# Patient Record
Sex: FEMALE | Race: WHITE | Employment: OTHER | URBAN - METROPOLITAN AREA
[De-identification: names, ages, dates, MRNs, and addresses within clinical notes are randomized per-mention and may not be internally consistent; named-entity substitution may affect disease eponyms.]

---

## 2017-01-04 ENCOUNTER — APPOINTMENT (OUTPATIENT)
Dept: CT IMAGING | Age: 82
End: 2017-01-04
Attending: PHYSICIAN ASSISTANT
Payer: MEDICARE

## 2017-01-04 ENCOUNTER — HOSPITAL ENCOUNTER (EMERGENCY)
Age: 82
Discharge: HOME | End: 2017-01-04
Attending: EMERGENCY MEDICINE
Payer: MEDICARE

## 2017-01-04 ENCOUNTER — APPOINTMENT (OUTPATIENT)
Dept: GENERAL RADIOLOGY | Age: 82
End: 2017-01-04
Attending: PHYSICIAN ASSISTANT
Payer: MEDICARE

## 2017-01-04 VITALS
SYSTOLIC BLOOD PRESSURE: 107 MMHG | HEIGHT: 64 IN | OXYGEN SATURATION: 99 % | BODY MASS INDEX: 28.51 KG/M2 | HEART RATE: 82 BPM | TEMPERATURE: 98.3 F | WEIGHT: 167 LBS | DIASTOLIC BLOOD PRESSURE: 65 MMHG | RESPIRATION RATE: 18 BRPM

## 2017-01-04 DIAGNOSIS — M25.462 EFFUSION OF LEFT KNEE: ICD-10-CM

## 2017-01-04 DIAGNOSIS — M17.12 OSTEOARTHRITIS OF LEFT KNEE, UNSPECIFIED OSTEOARTHRITIS TYPE: ICD-10-CM

## 2017-01-04 DIAGNOSIS — S09.90XA HEAD INJURY, INITIAL ENCOUNTER: ICD-10-CM

## 2017-01-04 DIAGNOSIS — S63.502A LEFT WRIST SPRAIN, INITIAL ENCOUNTER: Primary | ICD-10-CM

## 2017-01-04 PROCEDURE — 77030036552 HC SPLNT WR FRARM PRE-FB S2SG -A

## 2017-01-04 PROCEDURE — 70450 CT HEAD/BRAIN W/O DYE: CPT

## 2017-01-04 PROCEDURE — 99283 EMERGENCY DEPT VISIT LOW MDM: CPT

## 2017-01-04 PROCEDURE — 73562 X-RAY EXAM OF KNEE 3: CPT

## 2017-01-04 PROCEDURE — 73110 X-RAY EXAM OF WRIST: CPT

## 2017-01-04 PROCEDURE — L1830 KO IMMOB CANVAS LONG PRE OTS: HCPCS

## 2017-01-04 RX ORDER — HYDROCODONE BITARTRATE AND ACETAMINOPHEN 5; 325 MG/1; MG/1
1 TABLET ORAL
Qty: 12 TAB | Refills: 0 | Status: SHIPPED | OUTPATIENT
Start: 2017-01-04 | End: 2017-02-14

## 2017-01-04 NOTE — DISCHARGE INSTRUCTIONS
We hope that we have addressed all of your medical concerns. The examination and treatment you received in the Emergency Department were for an emergent problem and were not intended as complete care. It is important that you follow up with your healthcare provider(s) for ongoing care. If your symptoms worsen or do not improve as expected, and you are unable to reach your usual health care provider(s), you should return to the Emergency Department. Today's healthcare is undergoing tremendous change, and patient satisfaction surveys are one of the many tools to assess the quality of medical care. You may receive a survey from the [employer] organization regarding your experience in the Emergency Department. I hope that your experience has been completely positive, particularly the medical care that I provided. As such, please participate in the survey; anything less than excellent does not meet my expectations or intentions. [facility] and [facility] participate in nationally recognized quality of care measures. If your blood pressure is greater than 120/80, as reported below, we urge that you seek medical care to address the potential of high blood pressure, commonly known as hypertension. Hypertension can be hereditary or can be caused by certain medical conditions, pain, stress, or \"white coat syndrome. \"       Please make an appointment with your health care provider(s) for follow up of your Emergency Department visit. VITALS:   Patient Vitals for the past 8 hrs:   Temp Pulse Resp BP SpO2   01/04/17 1500 98.3 °F (36.8 °C) 82 18 107/65 99 %          Thank you for allowing us to provide you with medical care today. We realize that you have many choices for your emergency care needs. Please choose us in the future for any continued health care needs. Kiesha Sow [place], [address] [place] [address] [address]   Office: [phone]            No results found for this or any previous visit (from the past 24 hour(s)). Xr Wrist Lt Ap/lat/obl Min 3v    Result Date: 1/4/2017  EXAM: XR WRIST LT AP/LAT/OBL MIN 3V INDICATION:  left wrist. COMPARISON: None. FINDINGS: 4  views of the left wrist demonstrate no fracture or other acute osseous or articular abnormality. 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 The soft tissues are within normal limits. IMPRESSION:  No acute abnormality. Ct Head Wo Cont    Result Date: 1/4/2017  INDICATION: Fall. EXAM: Multiple contiguous helically acquired images were obtained through the brain without intravenous contrast administration. Sagittal and coronal reconstructions were performed. CT dose reduction was achieved through the use of a standardized protocol tailored for this examination and automatic exposure control for dose modulation. FINDINGS: Comparison exam:  September 21, 2016. Images through the brain reveal moderate to severe prominence of the basilar cisterns and cortical sulci and ventricles. Moderate to severe relatively symmetric hypoattenuation is seen to be present in the deep white matter and centrum semiovale of both cerebral hemispheres. Subinsular hypodensity redemonstrated bilaterally. Probable right basal ganglia lacune, chronic. No confluent infarct or mass effect or shift of midline structures is identified. There is no intracranial hemorrhage identified. Bone windows demonstrate no depressed skull fracture. IMPRESSION: Moderate to severe atrophy. Moderate to severe deep white matter ischemic change. 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 No new confluent infarct or hemorrhage or mass effect. Xr Knee Lt 3 V    Result Date: 1/4/2017  EXAM:  XR KNEE LT 3 V INDICATION:   left knee pain. COMPARISON: None. FINDINGS: Three views of the left knee demonstrate no fracture or other acute osseous or articular abnormality.  There is tricompartmental osteoarthritis with mild to moderate medial joint space narrowing. Small knee joint effusion is present. IMPRESSION:  No acute fracture or dislocation. Osteoarthritis with small knee joint effusion. Wrist Sprain: Care Instructions  Your Care Instructions    Your wrist hurts because you have stretched or torn ligaments, which connect the bones in your wrist.  Wrist sprains usually take from 2 to 10 weeks to heal, but some take longer. Usually, the more pain you have, the more severe your wrist sprain is and the longer it will take to heal. You can heal faster and regain strength in your wrist with good home treatment. Follow-up care is a key part of your treatment and safety. Be sure to make and go to all appointments, and call your doctor if you are having problems. It's also a good idea to know your test results and keep a list of the medicines you take. How can you care for yourself at home? · Prop up your arm on a pillow when you ice it or anytime you sit or lie down for the next 3 days. Try to keep your wrist above the level of your heart. This will help reduce swelling. · Put ice or cold packs on your wrist for 10 to 20 minutes at a time. Try to do this every 1 to 2 hours for the next 3 days (when you are awake) or until the swelling goes down. Put a thin cloth between the ice pack and your skin. · After 2 or 3 days, if your swelling is gone, apply a heating pad set on low or a warm cloth to your wrist. This helps keep your wrist flexible. Some doctors suggest that you go back and forth between hot and cold. · If you have an elastic bandage, keep it on for the next 24 to 36 hours. The bandage should be snug but not so tight that it causes numbness or tingling. To rewrap the wrist, wrap the bandage around the hand a few times, beginning at the fingers. Then wrap it around the hand between the thumb and index finger, ending by circling the wrist several times.   · If your doctor gave you a splint or brace, wear it as directed to protect your wrist until it has healed. · Take pain medicines exactly as directed. ¨ If the doctor gave you a prescription medicine for pain, take it as prescribed. ¨ If you are not taking a prescription pain medicine, ask your doctor if you can take an over-the-counter medicine. · Try not to use your injured wrist and hand. When should you call for help? Call your doctor now or seek immediate medical care if:  · Your hand or fingers are cool or pale or change color. Watch closely for changes in your health, and be sure to contact your doctor if:  · Your pain gets worse. · Your wrist has not improved after 1 week. Where can you learn more? Go to http://smita-armand.info/. Enter G541 in the search box to learn more about \"Wrist Sprain: Care Instructions. \"  Current as of: May 23, 2016  Content Version: 11.1  © 3786-9528 [employer]. Care instructions adapted under license by [employer] (which disclaims liability or warranty for this information). If you have questions about a medical condition or this instruction, always ask your healthcare professional. [place] [address] any warranty or liability for your use of this information. Learning About RICE (Rest, Ice, Compression, and Elevation)  What is RICE? RICE is a way to care for an injury. RICE helps relieve pain and swelling. It may also help with healing and flexibility. RICE stands for:  · Rest and protect the injured or sore area. · Ice or a cold pack used as soon as possible. · Compression, or wrapping the injured or sore area with an elastic bandage. · Elevation (propping up) the injured or sore area. How do you do RICE? You can use RICE for home treatment when you have general aches and pains or after an injury or surgery. Rest  · Do not put weight on the injury for at least 24 to 48 hours.   · Use crutches for a badly sprained knee or ankle. · Support a sprained wrist, elbow, or shoulder with a sling. Ice  · Put ice or a cold pack on the injury right away to reduce pain and swelling. Frozen vegetables will also work as an ice pack. Put a thin cloth between the ice or cold pack and your skin. The cloth protects the injured area from getting too cold. · Use ice for 10 to 15 minutes at a time for the first 48 to 72 hours. Compression  · Use compression for sprains, strains, and surgeries of the arms and legs. · Wrap the injured area with an elastic bandage or compression sleeve to reduce swelling. · Don't wrap it too tightly. If the area below it feels numb, tingles, or feels cool, loosen the wrap. Elevation  · Use elevation for areas of the body that can be propped up, such as arms and legs. · Prop up the injured area on pillows whenever you use ice. Keep it propped up anytime you sit or lie down. · Try to keep the injured area at or above the level of your heart. This will help reduce swelling and bruising. Where can you learn more? Go to http://smitaJoinnusarmand.info/. Enter V523 in the search box to learn more about \"Learning About RICE (Rest, Ice, Compression, and Elevation). \"  Current as of: May 23, 2016  Content Version: 11.1  © 4339-0538 [employer]. Care instructions adapted under license by [employer] (which disclaims liability or warranty for this information). If you have questions about a medical condition or this instruction, always ask your healthcare professional. [place] [address] any warranty or liability for your use of this information. Closed Head Injury: After Your Visit  Your Care Instructions  You have had a head injury. Often, people cannot remember what happened right before or right after a head injury. Some head injuries can make you pass out, or lose consciousness, for a few seconds or minutes right after the injury.   You need to have someone watch you closely for the next 24 hours. Contact your regular doctor to discuss follow-up care. Follow-up care is a key part of your treatment and safety. Be sure to make and go to all appointments, and call your doctor if you are having problems. It's also a good idea to know your test results and keep a list of the medicines you take. How can you care for yourself at home? · Have another adult watch you closely for the next 24 hours. That person should check for signs that your head injury is getting worse. · Put ice or a cold pack on the sore area for 10 to 20 minutes at a time. Put a thin cloth between the ice and your skin. · Take an over-the-counter pain medicine, such as acetaminophen (Tylenol), ibuprofen (Advil, Motrin), or naproxen (Aleve). Read and follow all instructions on the label. · You may sleep. If your doctor tells you to, have another adult check you at the suggested times to make sure you are able to wake up, recognize the other adult, and act normally. · Take it easy for the next few days or longer if you are not feeling well. · Do not drink any alcohol for at least the next 24 hours. What is postconcussive syndrome? If you have had a mild concussion, you may have a mild headache or just feel \"not quite right. \" These symptoms are common and usually go away on their own over a few days to 4 weeks. Sometimes after a concussion you may feel as if you are not functioning as well as you did before the injury, and you may develop new symptoms. This is called postconcussive syndrome. You may:  · Have changes in your ability to solve problems, think, concentrate, or remember. · Have headaches. · Have changes in your sleep patterns, such as not being able to sleep or sleeping all the time. · Have changes in your personality. · Lack interest in your daily activities. · Become easily angered or anxious for no clear reason. · Have changes in your sex drive.   · Lose your sense of taste or smell. · Be dizzy, lightheaded, or unsteady and find it hard to stand or walk. When should you call for help? Call 911 anytime you think you may need emergency care. For example, call if:  · You have twitching, jerking, or a seizure. · You suddenly cannot walk or stand. · You passed out (lost consciousness). · You are confused, do not know where you are, or are very sleepy or hard to wake up. Call your doctor now or seek immediate medical care if:  · You continue to vomit after 2 hours, or you have new vomiting. · You have a new watery (not like mucus from a cold) or bloody fluid coming from your nose or ears. · You have new weakness or numbness in any part of your body. · You have trouble walking. · Your headaches get worse. · Your vision changes. Watch closely for changes in your health, and be sure to contact your doctor if:  · You do not get better as expected. Where can you learn more? Go to Rankomat.pl.be  Enter B594 in the search box to learn more about \"Closed Head Injury: After Your Visit. \"   © 9911-6804 [employer]. Care instructions adapted under license by [employer] (which disclaims liability or warranty for this information). This care instruction is for use with your licensed healthcare professional. If you have questions about a medical condition or this instruction, always ask your healthcare professional. [place] [address] any warranty or liability for your use of this information.   Content Version: 2.8.628820; Last Revised: June 27, 2012

## 2017-01-04 NOTE — ED TRIAGE NOTES
L wrist and L knee pain since a GLF yesterday. No head injury or LOC. Ambulatory since (with her cane as per usual). States the fall happened because her knee buckled.

## 2017-01-04 NOTE — ED PROVIDER NOTES
HPI Comments: Manuel Crystal is a 80 y.o. female who presents ambulatory to ER with c/o left wrist pain and left knee pain x fall yesterday afternoon. Pt states she was walking in the parking lot when her left knee buckled causing her to fall onto her left knee then her left wrist. Notes she struck the left side of her forehead/scalp on the pavement. Denies LOC. Notes someone saw her fall and came to help her stand up. Was able to stand up with assistance of the passerby. Notes she has been walking with her cane, which she normally does, since the fall without any significant difficulty. Does note that pain in her left knee is worse with walking/weight bearing and notes she has been unable to use her left wrist/arm to support herself when she goes to stand up and has been unable to use her cane with her left hand due to pain. Denies any weakness, numbness, blurred vision, visual changes, neck pain, back pain, vomiting, nausea, and any other sx. Friend with pt notes she has been having \"more trouble than usual\" getting around since the fall. She specifically denies any fevers, chills, nausea, vomiting, chest pain, shortness of breath, headache, rash, diarrhea, abdominal pain, urinary/bowel changes, sweating or weight loss.     PCP: Adeline Phipps MD   PMHx significant for: Past Medical History:    Arthritis                                                     Breast cancer ([address]                                           Diabetes ([facility]                                                Diarrhea                                                        Comment:chronic     Dyslipidemia                                                  GERD (gastroesophageal reflux disease)                        Hypertension                                                  LBBB (left bundle branch block)                                 Comment:chronic LBBB    Non-ischemic cardiomyopathy ([address]                               Comment:Cath 2/16/15 - Mild (non-obstructive) CAD,LVEF                30%, 2+ MR    Seizures (HCC)                                                  Comment:X1 post partum after first child was born- no                more seizures    Skin cancer                                                     Comment:melanoma on legs    Sleep apnea                                                     Comment:wears O2 at night- cannot tolerate CPAP    Systolic CHF ([facility]                                              Comment:Admission 2/13/16    Thyroid disease                                               PSHx significant for: Past Surgical History:    HX PARTIAL THYROIDECTOMY                                       HX HYSTEROSCOPY                                                HX HEART CATHETERIZATION                                         Comment:x2- no blockages    HX OTHER SURGICAL                                2000            Comment:remove benign tumore right side between rib and               right lung    HX HYSTERECTOMY                                  1967          HX OOPHORECTOMY                                  1967            Comment:left    HX DILATION AND CURETTAGE                        1960          HX ORTHOPAEDIC                                   2010            Comment:right knee replacement    HX ORTHOPAEDIC                                                   Comment:repair torn meniscus left knee    HX ORTHOPAEDIC                                   1996            Comment:repair knuckle on left pinky finger    HX CHOLECYSTECTOMY                                             HX BREAST LUMPECTOMY                             2003            Comment:right    HX UROLOGICAL                                    2003            Comment:bladder surgery x2    HX CATARACT REMOVAL                              1994            Comment:bilateral    HX HEENT                                         2004            Comment:surgery on upper jaw        -- Beta-Blockers (Beta-Adrenergic Blocking Agts) -- Unknown (comments)    --  Made her feel tired, breakout on her back   -- Other Medication -- Unknown (comments)    --  Bromides   -- Phenobarbital -- Unknown (comments)   -- Sulfa (Sulfonamide Antibiotics) -- Swelling    --  rash    There are no other complaints, changes or physical findings at this time. The history is provided by the patient and a friend.         Past Medical History:   Diagnosis Date    Arthritis     [facility]     Diabetes ([facility]     Diarrhea      chronic     Dyslipidemia     GERD (gastroesophageal reflux disease)     Hypertension     LBBB (left bundle branch block)      chronic LBBB    Non-ischemic cardiomyopathy ([facility]      Cath 2/16/15 - Mild (non-obstructive) CAD,LVEF 30%, 2+ MR    Seizures (HCC)      X1 post partum after first child was born- no more seizures    Skin cancer      melanoma on legs    Sleep apnea      wears O2 at night- cannot tolerate CPAP    Systolic CHF [facility])      Admission 2/13/16    Thyroid disease        Past Surgical History:   Procedure Laterality Date    Hx partial thyroidectomy      Hx hysteroscopy      Hx heart catheterization       x2- no blockages    Hx other surgical  2000     remove benign tumore right side between rib and right lung    Hx hysterectomy  1967    Hx oophorectomy  1967     left    Hx dilation and curettage  1960    Hx orthopaedic  2010     right knee replacement    Hx orthopaedic       repair torn meniscus left knee    Hx orthopaedic  1996     repair knuckle on left pinky finger    Hx cholecystectomy      Hx breast lumpectomy  2003     right    Hx urological  2003     bladder surgery x2    Hx cataract removal  1994     bilateral    Hx heent  2004     surgery on upper jaw         Family History:   Problem Relation Age of Onset    Stroke Mother        Social History     Social History    Marital status:      Spouse name: N/A    Number of children: N/A  Years of education: N/A     Occupational History    Not on file. Social History Main Topics    Smoking status: Never Smoker    Smokeless tobacco: Never Used    Alcohol use 2.4 oz/week     4 Glasses of wine per week      Comment: 4    Drug use: No    Sexual activity: Not on file     Other Topics Concern    Not on file     Social History Narrative         ALLERGIES: Beta-blockers (beta-adrenergic blocking agts); Other medication; Phenobarbital; and Sulfa (sulfonamide antibiotics)    Review of Systems   Constitutional: Negative. Negative for appetite change, chills, fatigue and fever. HENT: Negative. Negative for congestion and sore throat. Eyes: Negative. Negative for visual disturbance. Respiratory: Negative. Negative for cough and shortness of breath. Cardiovascular: Negative. Negative for chest pain, palpitations and leg swelling. Gastrointestinal: Negative. Negative for abdominal pain, constipation, diarrhea, nausea and vomiting. Genitourinary: Negative. Negative for dysuria, flank pain and hematuria. Musculoskeletal: Positive for arthralgias (L wrist, L knee). Negative for back pain and neck pain. Skin: Negative. Negative for rash. Neurological: Negative. Negative for dizziness, syncope, weakness, numbness and headaches. Hematological: Negative. Psychiatric/Behavioral: Negative. All other systems reviewed and are negative. Vitals:    01/04/17 1500   BP: 107/65   Pulse: 82   Resp: 18   Temp: 98.3 °F (36.8 °C)   SpO2: 99%   Weight: 75.8 kg (167 lb)   Height: 5' 4\" (1.626 m)            Physical Exam   Constitutional: She is oriented to person, place, and time. She appears well-developed and well-nourished. No distress. HENT:   Head: Normocephalic and atraumatic.    Right Ear: External ear normal.   Left Ear: External ear normal.   Nose: Nose normal.   Mouth/Throat: Oropharynx is clear and moist.   Eyes: Conjunctivae and EOM are normal. Pupils are equal, round, and reactive to light. Neck: Normal range of motion. Neck supple. Cardiovascular: Normal rate, S1 normal, S2 normal, normal heart sounds, intact distal pulses and normal pulses. Exam reveals no gallop and no friction rub. No murmur heard. Pulses:       Dorsalis pedis pulses are 2+ on the right side, and 2+ on the left side. Pulmonary/Chest: Effort normal and breath sounds normal. No accessory muscle usage. No respiratory distress. She has no decreased breath sounds. She has no wheezes. She has no rhonchi. She has no rales. Abdominal: Soft. Normal appearance and bowel sounds are normal. She exhibits no distension. There is no hepatosplenomegaly. There is no tenderness. There is no CVA tenderness. Musculoskeletal: Normal range of motion. She exhibits no edema or tenderness. Left knee TTP along anterior joint line with mild edema to joint with well healing scab from injury one week ago, no erythema, warmth, or other overlying skin abnormality. NVI distally, DP pulse 2+. Mild posterior left wrist TTP with mild edema/ecchymosis. Pain elicited with movement of wrist. Supination/pronation intact. No anatomic snuffbox tenderness. No further hand/LUE tenderness. NVI distally all fingers, brisk cap refill. No midline cervical, thoracic, or lumbar spinal tenderness to palpation. FROM spine and all joints/extremities in ER without difficulty. Ambulatory in ER without difficulty. Lymphadenopathy:     She has no cervical adenopathy. Neurological: She is alert and oriented to person, place, and time. She has normal strength. No sensory deficit. No focal neurologic deficit. Strength 5/5 and equal bilateral upper and lower extremities. NVI all extremities, brisk cap refill all extremities. DP pulse 2+ bilaterally. Radial pulse 2+ bilaterally. Skin: Skin is warm and dry. No rash noted. She is not diaphoretic. No erythema. No pallor. Psychiatric: She has a normal mood and affect.  Her behavior is normal.   Nursing note and vitals reviewed. MDM  Number of Diagnoses or Management Options  Effusion of left knee:   Head injury, initial encounter:   Left wrist sprain, initial encounter:   Osteoarthritis of left knee, unspecified osteoarthritis type:   Diagnosis management comments: DDx: sprain, strain, fx, contusion, head injury       Amount and/or Complexity of Data Reviewed  Tests in the radiology section of CPT®: ordered and reviewed  Obtain history from someone other than the patient: yes (Friend )  Review and summarize past medical records: yes  Discuss the patient with other providers: yes (Dr. Jcarlos Rahman)    Patient Progress  Patient progress: stable    ED Course       Procedures          3:48 PM   Triston Stevenson PA-C discussed patient with Alaina Harris MD who is in agreement with care plan as outlined. Will be in to see the patient. No further recommendations. Triston Stevenson PA-C       4:49 PM  Pt has been reevaluated. There are no new complaints, changes, or physical findings at this time. Medications have been reviewed w/ pt and/or family. Pt and/or family's questions have been answered. Pt and/or family expressed good understanding of the dx/tx/rx and is in agreement with plan of care. Pt instructed and agreed to f/u w/ PCP, ortho and to return to ED upon further deterioration. Pt is ready for discharge. LABORATORY TESTS:  No results found for this or any previous visit (from the past 12 hour(s)). IMAGING RESULTS:  CT HEAD WO CONT   Final Result      XR KNEE LT 3 V   Final Result      XR WRIST LT AP/LAT/OBL MIN 3V   Final Result        Xr Wrist Lt Ap/lat/obl Min 3v    Result Date: 1/4/2017  EXAM: XR WRIST LT AP/LAT/OBL MIN 3V INDICATION:  left wrist. COMPARISON: None. FINDINGS: 4  views of the left wrist demonstrate no fracture or other acute osseous or articular abnormality. 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  The soft tissues are within normal limits. IMPRESSION:  No acute abnormality. Ct Head Wo Cont    Result Date: 1/4/2017  INDICATION: Fall. EXAM: Multiple contiguous helically acquired images were obtained through the brain without intravenous contrast administration. Sagittal and coronal reconstructions were performed. CT dose reduction was achieved through the use of a standardized protocol tailored for this examination and automatic exposure control for dose modulation. FINDINGS: Comparison exam:  September 21, 2016. Images through the brain reveal moderate to severe prominence of the basilar cisterns and cortical sulci and ventricles. Moderate to severe relatively symmetric hypoattenuation is seen to be present in the deep white matter and centrum semiovale of both cerebral hemispheres. Subinsular hypodensity redemonstrated bilaterally. Probable right basal ganglia lacune, chronic. No confluent infarct or mass effect or shift of midline structures is identified. There is no intracranial hemorrhage identified. Bone windows demonstrate no depressed skull fracture. IMPRESSION: Moderate to severe atrophy. Moderate to severe deep white matter ischemic change. 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 No new confluent infarct or hemorrhage or mass effect. Xr Knee Lt 3 V    Result Date: 1/4/2017  EXAM:  XR KNEE LT 3 V INDICATION:   left knee pain. COMPARISON: None. FINDINGS: Three views of the left knee demonstrate no fracture or other acute osseous or articular abnormality. There is tricompartmental osteoarthritis with mild to moderate medial joint space narrowing. Small knee joint effusion is present. IMPRESSION:  No acute fracture or dislocation. Osteoarthritis with small knee joint effusion. MEDICATIONS GIVEN:  Medications - No data to display    IMPRESSION:  1. Left wrist sprain, initial encounter    2. Osteoarthritis of left knee, unspecified osteoarthritis type    3. Effusion of left knee    4.  Head injury, initial encounter        PLAN:  1. Current Discharge Medication List      START taking these medications    Details   HYDROcodone-acetaminophen (NORCO) 5-325 mg per tablet Take 1 Tab by mouth every four (4) hours as needed for Pain. Max Daily Amount: 6 Tabs. Qty: 12 Tab, Refills: 0         CONTINUE these medications which have NOT CHANGED    Details   bumetanide (BUMEX) 1 mg tablet One pill by mouth every morning and one pill every afternoon no later than 2:00 p.m. Qty: 180 Tab, Refills: 3    Associated Diagnoses: SOB (shortness of breath)      carvedilol (COREG) 6.25 mg tablet Take 1 Tab by mouth two (2) times daily (with meals). Qty: 180 Tab, Refills: 3    Associated Diagnoses: SOB (shortness of breath)      losartan (COZAAR) 25 mg tablet Take 1 Tab by mouth nightly. Qty: 90 Tab, Refills: 3      potassium chloride SR (K-TAB) 20 mEq tablet Take 1 Tab by mouth daily. Qty: 90 Tab, Refills: 3    Associated Diagnoses: Acute respiratory failure with hypoxia ([address] Systolic CHF, acute ([address] Essential hypertension; SOB (shortness of breath)      pravastatin (PRAVACHOL) 40 mg tablet Take 1 Tab by mouth nightly. Qty: 90 Tab, Refills: 3    Associated Diagnoses: SOB (shortness of breath); Systolic CHF, acute ([address] Acute respiratory failure with hypoxia ([address] Essential hypertension      ciprofloxacin HCl (CIPRO) 250 mg tablet Take  by mouth nightly. LACTOBACILLUS ACIDOPHILUS (PROBIOTIC PO) Take  by mouth daily. Associated Diagnoses: SOB (shortness of breath); Systolic CHF, acute ([address] Acute respiratory failure with hypoxia ([address] Essential hypertension      omeprazole (PRILOSEC) 20 mg capsule Take 20 mg by mouth daily. Associated Diagnoses: Acute respiratory failure with hypoxia ([address] Systolic CHF, acute ([address] Essential hypertension; SOB (shortness of breath)      aspirin 81 mg chewable tablet Take 1 Tab by mouth daily.   Qty: 30 Tab, Refills: 0      co-enzyme Q-10 (CO Q-10) 50 mg capsule Take 100 mg by mouth daily (after lunch). imipramine (TOFRANIL) 25 mg tablet Take 25 mg by mouth nightly. levothyroxine (SYNTHROID) 88 mcg tablet Take 88 mcg by mouth Daily (before breakfast). sertraline (ZOLOFT) 100 mg tablet Take 100 mg by mouth nightly. metFORMIN (GLUCOPHAGE) 1,000 mg tablet Take 1,000 mg by mouth two (2) times daily (with meals). ergocalciferol (VITAMIN D2) 50,000 unit capsule Take one tablet on Monday, Wednesday and Friday. melatonin 10 mg cap Take 10 mg by mouth nightly. calcium 500 mg tab Take 500 mg by mouth daily (after lunch). multivitamin (ONE A DAY) tablet Take 1 Tab by mouth daily (after lunch). DOCOSAHEXANOIC ACID/EPA (FISH OIL PO) Take 1 Tab by mouth daily (after lunch).            2.   Follow-up Information     Follow up With Details Comments [id number]  [place] Schedule an appointment as soon as possible for a visit in 2 days  [address]   [address] [facility]  [phone]      Ariela Shelton MD Schedule an appointment as soon as possible for a visit in 1 week As needed; ORTHOPEDICS [address] [facility] [address] [facility] [address] [phone]      [facility] EMERGENCY DEPT  If symptoms worsen [address] [facility]  [phone]          Return to ED if worse

## 2017-02-02 ENCOUNTER — HOSPITAL ENCOUNTER (OUTPATIENT)
Dept: LAB | Age: 82
Discharge: HOME | End: 2017-02-02
Payer: MEDICARE

## 2017-02-02 ENCOUNTER — CLINICAL SUPPORT (OUTPATIENT)
Dept: CARDIOLOGY CLINIC | Age: 82
End: 2017-02-02

## 2017-02-02 ENCOUNTER — OFFICE VISIT (OUTPATIENT)
Dept: CARDIOLOGY CLINIC | Age: 82
End: 2017-02-02

## 2017-02-02 VITALS
DIASTOLIC BLOOD PRESSURE: 60 MMHG | WEIGHT: 170 LBS | HEART RATE: 82 BPM | SYSTOLIC BLOOD PRESSURE: 132 MMHG | BODY MASS INDEX: 29.02 KG/M2 | HEIGHT: 64 IN

## 2017-02-02 DIAGNOSIS — E11.9 TYPE 2 DIABETES MELLITUS WITHOUT COMPLICATION, WITHOUT LONG-TERM CURRENT USE OF INSULIN (HCC): ICD-10-CM

## 2017-02-02 DIAGNOSIS — E78.5 DYSLIPIDEMIA: ICD-10-CM

## 2017-02-02 DIAGNOSIS — I50.22 CHRONIC SYSTOLIC HEART FAILURE (HCC): ICD-10-CM

## 2017-02-02 DIAGNOSIS — I10 ESSENTIAL HYPERTENSION: ICD-10-CM

## 2017-02-02 DIAGNOSIS — I50.22 CHRONIC SYSTOLIC HEART FAILURE (HCC): Primary | ICD-10-CM

## 2017-02-02 DIAGNOSIS — Z95.810 PRESENCE OF AUTOMATIC CARDIOVERTER/DEFIBRILLATOR (AICD): Primary | ICD-10-CM

## 2017-02-02 DIAGNOSIS — R06.02 SOB (SHORTNESS OF BREATH): Primary | ICD-10-CM

## 2017-02-02 PROCEDURE — 83880 ASSAY OF NATRIURETIC PEPTIDE: CPT

## 2017-02-02 PROCEDURE — 80053 COMPREHEN METABOLIC PANEL: CPT

## 2017-02-02 PROCEDURE — 83036 HEMOGLOBIN GLYCOSYLATED A1C: CPT

## 2017-02-02 PROCEDURE — 85027 COMPLETE CBC AUTOMATED: CPT

## 2017-02-02 PROCEDURE — 84443 ASSAY THYROID STIM HORMONE: CPT

## 2017-02-02 PROCEDURE — 80061 LIPID PANEL: CPT

## 2017-02-02 NOTE — PROGRESS NOTES
Doroteo Chaves MD. [facility]              Patient: Lily Morelos  : 1928      Today's Date: 2017            HISTORY OF PRESENT ILLNESS:     History of Present Illness:  Ms. Han Briceño is here for follow-up. She is feeling a little better. Energy level remains weak. She has had a couple of falls past year. She fell  - she says there was dizziness before hand but just became weak suddenly.                 PAST MEDICAL HISTORY:     Past Medical History   Diagnosis Date    Arthritis     [facility] ([facility]     Diabetes ([facility]     Diarrhea      chronic     Dyslipidemia     GERD (gastroesophageal reflux disease)     Hypertension     LBBB (left bundle branch block)      chronic LBBB    Non-ischemic cardiomyopathy ([facility]      Cath 2/16/15 - Mild (non-obstructive) CAD,LVEF 30%, 2+ MR    Seizures (HCC)      X1 post partum after first child was born- no more seizures    Skin cancer      melanoma on legs    Sleep apnea      wears O2 at night- cannot tolerate CPAP    Systolic CHF [facility])      Admission 16    Thyroid disease          Past Surgical History   Procedure Laterality Date    Hx partial thyroidectomy      Hx hysteroscopy      Hx heart catheterization       x2- no blockages    Hx other surgical       remove benign tumore right side between rib and right lung    Hx hysterectomy  1967    Hx oophorectomy  1967     left    Hx dilation and curettage  1960    Hx orthopaedic  2010     right knee replacement    Hx orthopaedic       repair torn meniscus left knee    Hx orthopaedic       repair knuckle on left pinky finger    Hx cholecystectomy      Hx breast lumpectomy  2003     right    Hx urological  2003     bladder surgery x2    Hx cataract removal       bilateral    Hx heent       surgery on upper jaw           MEDICATIONS:     Current Outpatient Prescriptions   Medication Sig Dispense Refill    HYDROcodone-acetaminophen (NORCO) 5-325 mg per tablet Take 1 Tab by mouth every four (4) hours as needed for Pain. Max Daily Amount: 6 Tabs. 12 Tab 0    bumetanide (BUMEX) 1 mg tablet One pill by mouth every morning and one pill every afternoon no later than 2:00 p.m. 180 Tab 3    carvedilol (COREG) 6.25 mg tablet Take 1 Tab by mouth two (2) times daily (with meals). 180 Tab 3    losartan (COZAAR) 25 mg tablet Take 1 Tab by mouth nightly. 90 Tab 3    potassium chloride SR (K-TAB) 20 mEq tablet Take 1 Tab by mouth daily. 90 Tab 3    pravastatin (PRAVACHOL) 40 mg tablet Take 1 Tab by mouth nightly. 90 Tab 3    ciprofloxacin HCl (CIPRO) 250 mg tablet Take  by mouth nightly.  LACTOBACILLUS ACIDOPHILUS (PROBIOTIC PO) Take  by mouth daily.  omeprazole (PRILOSEC) 20 mg capsule Take 20 mg by mouth daily.  aspirin 81 mg chewable tablet Take 1 Tab by mouth daily. 30 Tab 0    co-enzyme Q-10 (CO Q-10) 50 mg capsule Take 100 mg by mouth daily (after lunch).  imipramine (TOFRANIL) 25 mg tablet Take 25 mg by mouth nightly.  levothyroxine (SYNTHROID) 88 mcg tablet Take 88 mcg by mouth Daily (before breakfast).  sertraline (ZOLOFT) 100 mg tablet Take 100 mg by mouth nightly.  metFORMIN (GLUCOPHAGE) 1,000 mg tablet Take 1,000 mg by mouth two (2) times daily (with meals).  melatonin 10 mg cap Take 10 mg by mouth nightly.  calcium 500 mg tab Take 500 mg by mouth daily (after lunch).  multivitamin (ONE A DAY) tablet Take 1 Tab by mouth daily (after lunch).  DOCOSAHEXANOIC ACID/EPA (FISH OIL PO) Take 1 Tab by mouth daily (after lunch).  ergocalciferol (VITAMIN D2) 50,000 unit capsule Take one tablet on Monday, Wednesday and Friday.          Allergies   Allergen Reactions    Beta-Blockers (Beta-Adrenergic Blocking Agts) Unknown (comments)     Made her feel tired, breakout on her back    Other Medication Unknown (comments)     Bromides    Phenobarbital Unknown (comments)    Sulfa (Sulfonamide Antibiotics) Swelling     rash SOCIAL HISTORY:     Social History   Substance Use Topics    Smoking status: Never Smoker    Smokeless tobacco: Never Used    Alcohol use 2.4 oz/week     4 Glasses of wine per week      Comment: 4         FAMILY HISTORY:     Family History   Problem Relation Age of Onset    Stroke Mother              REVIEW OF SYSTEMS:             Review of Symptoms:  Constitutional: Negative for fever, chills  HEENT: Negative for nosebleeds, tinnitus, and vision changes.    Respiratory: + HARTLEY  Cardiovascular: Negative for  Syncope    Gastrointestinal: Negative for   melena.    Genitourinary: + recent UTI    Musculoskeletal: Negative for myalgias.    Skin: Negative for rash  Heme: No problems bleeding. Neurological: Negative for speech change and focal weakness.    + HA  + cough              PHYSICAL EXAM:             Physical Exam:  Visit Vitals    /60    Pulse 82    Ht 5' 4\" (1.626 m)    Wt 170 lb (77.1 kg)    BMI 29.18 kg/m2       Patient appears generally well, mood and affect are appropriate and pleasant. HEENT:  Hearing intact, non-icteric, normocephalic, atraumatic.    Neck Exam: Supple, No sig JVD sitting up. No carotid bruits.    Lung Exam: Clear to auscultation, even breath sounds.    Cardiac Exam: Regular rate and rhythm with no murmur  Abdomen: Soft, non-tender    Extremities: Moves all ext well. No lower extremity edema.   Psych: Appropriate affect  Neuro - Grossly intact              LABS / OTHER STUDIES:           Lab Results   Component Value Date/Time    Sodium 137 08/08/2016 03:56 PM    Potassium 3.9 08/08/2016 03:56 PM    Chloride 92 08/08/2016 03:56 PM    CO2 28 08/08/2016 03:56 PM    Anion gap 10 02/18/2016 03:30 AM    Glucose 174 08/08/2016 03:56 PM    BUN 35 08/08/2016 03:56 PM    Creatinine 1.21 08/08/2016 03:56 PM    BUN/Creatinine ratio 29 08/08/2016 03:56 PM    GFR est AA 46 08/08/2016 03:56 PM    GFR est non-AA 40 08/08/2016 03:56 PM    Calcium 9.6 08/08/2016 03:56 PM Bilirubin, total 0.5 02/13/2016 05:40 AM    AST (SGOT) 30 02/13/2016 05:40 AM    Alk. phosphatase 51 02/13/2016 05:40 AM    Protein, total 6.5 02/13/2016 05:40 AM    Albumin 3.4 02/18/2016 03:30 AM    Globulin 2.9 02/13/2016 05:40 AM    A-G Ratio 1.2 02/13/2016 05:40 AM    ALT (SGPT) 35 02/13/2016 05:40 AM     Lab Results   Component Value Date/Time    WBC 8.3 08/08/2016 03:56 PM    Hemoglobin (POC) 12.6 01/15/2014 12:49 PM    HGB 12.6 08/08/2016 03:56 PM    Hematocrit (POC) 37 01/15/2014 12:49 PM    HCT 37.9 08/08/2016 03:56 PM    PLATELET 953 63/37/0586 03:56 PM    MCV 98 08/08/2016 03:56 PM                             CARDIAC DIAGNOSTICS:                 Cardiac Evaluation Includes:  EKG 2/13/16 - NSR, IVCD, PRWP      Echo 2/13/16 - TDS.   LVEF 30-35%.   There was moderate diffuse hypokinesis - Only the basal anterolateral and infrolateral walls contracts normally.  RV size and function normal.  RVSP 35      CTA chest 2/13/16 - No evidence of pulmonary embolus. Mild pulmonary interstitial edema and small bilateral pleural effusions. Small bilateral indeterminate pulmonary nodules, measuring up to 4 mm.      Cath 2/16/16 - Mild (non-obstructive) CAD,LVEF 30%, 2+ MR      Echo 4/14/16 - LVEF 30%, mild-mod MR, RVSP 28       Limited Echo 5/26/16 - LVEF 20%     Echo 7/28/16 - LVEF 25%, grade 1 diastology, mild MR, RVSP 31     BIV ICD placed 8/15/16      Echo 2/2/17 - LVEF 40%, grade 1 diastology, mod LAE, mild-mod MR, mild TR, AV sclerosis     CT head 1/4/17 -Moderate to severe atrophy. Moderate to severe deep white matter ischemic change. 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 No new confluent infarct or hemorrhage or mass effect. EKG 12/1/15 - NSR, Nonspecific intraventricular block,                  ASSESSMENT AND PLAN:             Assessment and Plan:  1) Acute / Chronic decompensated CHF (systolic dysfunction, HFrEF)(non-ischemic CM) - discovered 2/13/16   - Echo 4/14/16 shows that LVEF still is 30%.      - Echo 5/26/16 shows LVEF just 20%.     Echo 7/28/16 with LVEF 25%. - BIV ICD placed 8/15/16   - She does feel a little better since then. - On Echo 2/2/17 LVEF is better at 40%  - She is still tired   - volume status is fair on Bumex   - continue Coreg.  Due to cough, switched from lisinopril to losartan (25 mg daily to start). She could not tolerate Entresto at all.        2) HTN    - BP stable   - continue meds   - she denies dizziness although has had a couple of falls - will follow - has an ICD now - I asked her to see since patient was asking about neurological workup       3) See me in 3 months. Patient expressed understanding of the plan - questions were answered.       On a side note, she is from [place] and worked for years as a .           Raine Stratton MD, [address] [place]  [address] [place] Rom[place], [address]      [address] [place] [address].  [address]  [facility], [address] [place] [address]  Ph: [phone]                                [phone]

## 2017-02-02 NOTE — PATIENT INSTRUCTIONS

## 2017-02-03 LAB
ALBUMIN SERPL-MCNC: 4.5 G/DL (ref 3.5–4.7)
ALBUMIN/GLOB SERPL: 1.9 {RATIO} (ref 1.1–2.5)
ALP SERPL-CCNC: 59 IU/L (ref 39–117)
ALT SERPL-CCNC: 15 IU/L (ref 0–32)
AST SERPL-CCNC: 19 IU/L (ref 0–40)
BILIRUB SERPL-MCNC: 0.4 MG/DL (ref 0–1.2)
BUN SERPL-MCNC: 34 MG/DL (ref 8–27)
BUN/CREAT SERPL: 25 (ref 11–26)
CALCIUM SERPL-MCNC: 9.6 MG/DL (ref 8.7–10.3)
CHLORIDE SERPL-SCNC: 97 MMOL/L (ref 96–106)
CHOLEST SERPL-MCNC: 184 MG/DL (ref 100–199)
CO2 SERPL-SCNC: 24 MMOL/L (ref 18–29)
CREAT SERPL-MCNC: 1.35 MG/DL (ref 0.57–1)
ERYTHROCYTE [DISTWIDTH] IN BLOOD BY AUTOMATED COUNT: 14.6 % (ref 12.3–15.4)
EST. AVERAGE GLUCOSE BLD GHB EST-MCNC: 151 MG/DL
GLOBULIN SER CALC-MCNC: 2.4 G/DL (ref 1.5–4.5)
GLUCOSE SERPL-MCNC: 172 MG/DL (ref 65–99)
HBA1C MFR BLD: 6.9 % (ref 4.8–5.6)
HCT VFR BLD AUTO: 34.5 % (ref 34–46.6)
HDLC SERPL-MCNC: 57 MG/DL
HGB BLD-MCNC: 11.5 G/DL (ref 11.1–15.9)
INTERPRETATION, 910389: NORMAL
INTERPRETATION: NORMAL
LDLC SERPL CALC-MCNC: 52 MG/DL (ref 0–99)
Lab: NORMAL
MCH RBC QN AUTO: 33.2 PG (ref 26.6–33)
MCHC RBC AUTO-ENTMCNC: 33.3 G/DL (ref 31.5–35.7)
MCV RBC AUTO: 100 FL (ref 79–97)
NT-PROBNP SERPL-MCNC: 1319 PG/ML (ref 0–738)
PDF IMAGE, 910387: NORMAL
PLATELET # BLD AUTO: 237 X10E3/UL (ref 150–379)
POTASSIUM SERPL-SCNC: 5.1 MMOL/L (ref 3.5–5.2)
PROT SERPL-MCNC: 6.9 G/DL (ref 6–8.5)
RBC # BLD AUTO: 3.46 X10E6/UL (ref 3.77–5.28)
SODIUM SERPL-SCNC: 141 MMOL/L (ref 134–144)
TRIGL SERPL-MCNC: 377 MG/DL (ref 0–149)
TSH SERPL DL<=0.005 MIU/L-ACNC: 0.76 UIU/ML (ref 0.45–4.5)
VLDLC SERPL CALC-MCNC: 75 MG/DL (ref 5–40)
WBC # BLD AUTO: 8.9 X10E3/UL (ref 3.4–10.8)

## 2017-02-14 ENCOUNTER — HOSPITAL ENCOUNTER (EMERGENCY)
Age: 82
Discharge: HOME | End: 2017-02-14
Attending: EMERGENCY MEDICINE | Admitting: EMERGENCY MEDICINE
Payer: MEDICARE

## 2017-02-14 VITALS
SYSTOLIC BLOOD PRESSURE: 159 MMHG | OXYGEN SATURATION: 98 % | HEART RATE: 100 BPM | WEIGHT: 168 LBS | BODY MASS INDEX: 28.68 KG/M2 | DIASTOLIC BLOOD PRESSURE: 72 MMHG | HEIGHT: 64 IN | TEMPERATURE: 97.9 F | RESPIRATION RATE: 16 BRPM

## 2017-02-14 DIAGNOSIS — S61.219A LACERATION OF FINGER, INITIAL ENCOUNTER: Primary | ICD-10-CM

## 2017-02-14 PROCEDURE — 74011250636 HC RX REV CODE- 250/636: Performed by: EMERGENCY MEDICINE

## 2017-02-14 PROCEDURE — 90715 TDAP VACCINE 7 YRS/> IM: CPT | Performed by: EMERGENCY MEDICINE

## 2017-02-14 PROCEDURE — 77030002916 HC SUT ETHLN J&J -A

## 2017-02-14 PROCEDURE — 75810000293 HC SIMP/SUPERF WND  RPR

## 2017-02-14 PROCEDURE — 90471 IMMUNIZATION ADMIN: CPT

## 2017-02-14 PROCEDURE — 77030018836 HC SOL IRR NACL ICUM -A

## 2017-02-14 PROCEDURE — 99282 EMERGENCY DEPT VISIT SF MDM: CPT

## 2017-02-14 PROCEDURE — 74011250637 HC RX REV CODE- 250/637: Performed by: EMERGENCY MEDICINE

## 2017-02-14 PROCEDURE — 74011000250 HC RX REV CODE- 250: Performed by: EMERGENCY MEDICINE

## 2017-02-14 RX ORDER — HYDROCODONE BITARTRATE AND ACETAMINOPHEN 5; 325 MG/1; MG/1
1 TABLET ORAL
Qty: 20 TAB | Refills: 0 | Status: SHIPPED | OUTPATIENT
Start: 2017-02-14 | End: 2017-06-16

## 2017-02-14 RX ORDER — LIDOCAINE HYDROCHLORIDE 20 MG/ML
10 INJECTION, SOLUTION EPIDURAL; INFILTRATION; INTRACAUDAL; PERINEURAL
Status: COMPLETED | OUTPATIENT
Start: 2017-02-14 | End: 2017-02-14

## 2017-02-14 RX ADMIN — LIDOCAINE HYDROCHLORIDE 200 MG: 20 INJECTION, SOLUTION EPIDURAL; INFILTRATION; INTRACAUDAL; PERINEURAL at 02:29

## 2017-02-14 RX ADMIN — TETANUS TOXOID, REDUCED DIPHTHERIA TOXOID AND ACELLULAR PERTUSSIS VACCINE, ADSORBED 0.5 ML: 5; 2.5; 8; 8; 2.5 SUSPENSION INTRAMUSCULAR at 03:17

## 2017-02-14 RX ADMIN — NEOMYCIN-BACITRACIN-POLYMYXIN OINT 1 PACKET: OINTMENT at 03:17

## 2017-02-14 NOTE — ED NOTES
Provider cleansed laceration prior to suture repair with saline via syringe and betadine. Post suture repair finger cleansed with Priya Wolfe. Bacitracin to area after cleansed and patted dry with 4x4s. Nonadherent gauze to area after antibiotic ointment placed as ordered. Finger dressed with tube gauze.

## 2017-02-14 NOTE — ED NOTES
Bedside and Verbal shift change report given to [address] [place] [address] (oncoming nurse) by Ebony Mittal (offgoing nurse). Report included the following information SBAR and ED Summary. Charge RN notified of patient waiting for 20 minutes after IM injection of Tetanus vaccine.

## 2017-02-14 NOTE — DISCHARGE INSTRUCTIONS
We hope that we have addressed all of your medical concerns. The examination and treatment you received in the Emergency Department were for an emergent problem and were not intended as complete care. It is important that you follow up with your healthcare provider(s) for ongoing care. If your symptoms worsen or do not improve as expected, and you are unable to reach your usual health care provider(s), you should return to the Emergency Department. Today's healthcare is undergoing tremendous change, and patient satisfaction surveys are one of the many tools to assess the quality of medical care. You may receive a survey from the [employer] regarding your experience in the Emergency Department. I hope that your experience has been completely positive, particularly the medical care that I provided. As such, please participate in the survey; anything less than excellent does not meet my expectations or intentions. [facility] and [address] [place] [address] participate in nationally recognized quality of care measures. If your blood pressure is greater than 120/80, as reported below, we urge that you seek medical care to address the potential of high blood pressure, commonly known as hypertension. Hypertension can be hereditary or can be caused by certain medical conditions, pain, stress, or \"white coat syndrome. \"       Please make an appointment with your health care provider(s) for follow up of your Emergency Department visit. VITALS:   Patient Vitals for the past 8 hrs:   Temp Pulse Resp BP SpO2   02/14/17 0207 97.9 °F (36.6 °C) 100 16 159/72 98 %          Thank you for allowing us to provide you with medical care today. We realize that you have many choices for your emergency care needs. Please choose us in the future for any continued health care needs. Vick Pugh, [address] Office: [phone]            No results found for this or any previous visit (from the past 24 hour(s)). No results found. Cuts: Care Instructions  Your Care Instructions  A cut can happen anywhere on your body. Stitches, staples, skin adhesives, or pieces of tape called Steri-Strips are sometimes used to keep the edges of a cut together and help it heal. Steri-Strips can be used by themselves or with stitches or staples. Sometimes cuts are left open. If the cut went deep and through the skin, the doctor may have closed the cut in two layers. A deeper layer of stitches brings the deep part of the cut together. These stitches will dissolve and don't need to be removed. The upper layer closure, which could be stitches, staples, Steri-Strips, or adhesive, is what you see on the cut. A cut is often covered by a bandage. The doctor has checked you carefully, but problems can develop later. If you notice any problems or new symptoms, get medical treatment right away. Follow-up care is a key part of your treatment and safety. Be sure to make and go to all appointments, and call your doctor if you are having problems. It's also a good idea to know your test results and keep a list of the medicines you take. How can you care for yourself at home? If a cut is open or closed  · Prop up the sore area on a pillow anytime you sit or lie down during the next 3 days. Try to keep it above the level of your heart. This will help reduce swelling. · Keep the cut dry for the first 24 to 48 hours. After this, you can shower if your doctor okays it. Pat the cut dry. · Don't soak the cut, such as in a bathtub. Your doctor will tell you when it's safe to get the cut wet. · After the first 24 to 48 hours, clean the cut with soap and water 2 times a day unless your doctor gives you different instructions. ¨ Don't use hydrogen peroxide or alcohol, which can slow healing.   ¨ You may cover the cut with a thin layer of petroleum jelly and a nonstick bandage. ¨ If the doctor put a bandage over the cut, put on a new bandage after cleaning the cut or if the bandage gets wet or dirty. · Avoid any activity that could cause your cut to reopen. · Be safe with medicines. Read and follow all instructions on the label. ¨ If the doctor gave you a prescription medicine for pain, take it as prescribed. ¨ If you are not taking a prescription pain medicine, ask your doctor if you can take an over-the-counter medicine. If the cut is closed with stitches, staples, or Steri-Strips  · Follow the above instructions for open or closed cuts. · Do not remove the stitches or staples on your own. Your doctor will tell you when to come back to have the stitches or staples removed. · Leave Steri-Strips on until they fall off. If the cut is closed with a skin adhesive  · Follow the above instructions for open or closed cuts. · Leave the skin adhesive on your skin until it falls off on its own. This may take 5 to 10 days. · Do not scratch, rub, or pick at the adhesive. · Do not put the sticky part of a bandage directly on the adhesive. · Do not put any kind of ointment, cream, or lotion over the area. This can make the adhesive fall off too soon. Do not use hydrogen peroxide or alcohol, which can slow healing. When should you call for help? Call your doctor now or seek immediate medical care if:  · You have new pain, or your pain gets worse. · The skin near the cut is cold or pale or changes color. · You have tingling, weakness, or numbness near the cut. · The cut starts to bleed, and blood soaks through the bandage. Oozing small amounts of blood is normal.  · You have trouble moving the area near the cut. · You have symptoms of infection, such as:  ¨ Increased pain, swelling, warmth, or redness around the cut. ¨ Red streaks leading from the cut. ¨ Pus draining from the cut. ¨ A fever.   Watch closely for changes in your health, and be sure to contact your doctor if:  · The cut reopens. · You do not get better as expected. Where can you learn more? Go to http://smita-armand.info/. Enter M735 in the search box to learn more about \"Cuts: Care Instructions. \"  Current as of: May 27, 2016  Content Version: 11.1  © 3212-6071 Reissued. Care instructions adapted under license by [employer] (which disclaims liability or warranty for this information). If you have questions about a medical condition or this instruction, always ask your healthcare professional. [place] [address] any warranty or liability for your use of this information.

## 2017-02-14 NOTE — ED TRIAGE NOTES
Patient reports she was opening a box with a knife 1 hour ago and cut her left index finger. States she cannot get the bleeding to stop.

## 2017-02-14 NOTE — ED PROVIDER NOTES
HPI Comments: 80 y.o. female with past medical history significant for HTN, Cardiac Cath, LBBB, Systolic CHF, Non-ischemic cardiomyopathy, DM, Arthritis, GERD, Seizures who presents from [address] [place] [address] driven by self with chief complaint of wound. Pt reports having difficulty opening pill bottle and used steak knife. She sustained a laceration to dorsal aspect of left, 2nd digit that is actively bleeding. Injury occured 1.5 hours ago. Pt notes Tetanus is UTD. Pt denies use of blood thinners. There are no other acute medical concerns at this time. PCP: Olimpia Ballesteros MD  Cardiology: Katty Sarah MD     Note written by Saul Arrington, as dictated by Michael Stahl, DO 2:33 AM      The history is provided by the patient. No  was used.       Past Medical History:   Diagnosis Date    Arthritis     [facility]     Diabetes ([facility]     Diarrhea      chronic     Dyslipidemia     GERD (gastroesophageal reflux disease)     Hypertension     LBBB (left bundle branch block)      chronic LBBB    Non-ischemic cardiomyopathy ([facility]      Cath 2/16/15 - Mild (non-obstructive) CAD,LVEF 30%, 2+ MR    Seizures (HCC)      X1 post partum after first child was born- no more seizures    Skin cancer      melanoma on legs    Sleep apnea      wears O2 at night- cannot tolerate CPAP    Systolic CHF [facility])      Admission 2/13/16    Thyroid disease        Past Surgical History:   Procedure Laterality Date    Hx partial thyroidectomy      Hx hysteroscopy      Hx heart catheterization       x2- no blockages    Hx other surgical  2000     remove benign tumore right side between rib and right lung    Hx hysterectomy  1967    Hx oophorectomy  1967     left    Hx dilation and curettage  1960    Hx orthopaedic  2010     right knee replacement    Hx orthopaedic       repair torn meniscus left knee    Hx orthopaedic  1996     repair knuckle on left pinky finger    Hx cholecystectomy  Hx breast lumpectomy  2003     right    Hx urological  2003     bladder surgery x2    Hx cataract removal  1994     bilateral    Hx heent  2004     surgery on upper jaw         Family History:   Problem Relation Age of Onset    Stroke Mother        Social History     Social History    Marital status:      Spouse name: N/A    Number of children: N/A    Years of education: N/A     Occupational History    Not on file. Social History Main Topics    Smoking status: Never Smoker    Smokeless tobacco: Never Used    Alcohol use 2.4 oz/week     4 Glasses of wine per week      Comment: 4    Drug use: No    Sexual activity: Not on file     Other Topics Concern    Not on file     Social History Narrative     ALLERGIES: Beta-blockers (beta-adrenergic blocking agts); Other medication; Phenobarbital; and Sulfa (sulfonamide antibiotics)    Review of Systems   Constitutional: Negative for appetite change, chills, fever and unexpected weight change. HENT: Negative for ear pain, hearing loss, rhinorrhea and trouble swallowing. Eyes: Negative for pain and visual disturbance. Respiratory: Negative for cough, chest tightness and shortness of breath. Cardiovascular: Negative for chest pain and palpitations. Gastrointestinal: Negative for abdominal distention, abdominal pain, blood in stool and vomiting. Genitourinary: Negative for dysuria, hematuria and urgency. Musculoskeletal: Negative for back pain and myalgias. Skin: Positive for wound (laceration to left second digit ). Negative for rash. Neurological: Negative for dizziness, syncope, weakness and numbness. Psychiatric/Behavioral: Negative for confusion and suicidal ideas. All other systems reviewed and are negative.       Vitals:    02/14/17 0207   BP: 159/72   Pulse: 100   Resp: 16   Temp: 97.9 °F (36.6 °C)   SpO2: 98%   Weight: 76.2 kg (168 lb)   Height: 5' 4\" (1.626 m)            Physical Exam   Constitutional: She is oriented to person, place, and time. She appears well-developed and well-nourished. No distress. HENT:   Head: Normocephalic and atraumatic. Right Ear: External ear normal.   Left Ear: External ear normal.   Nose: Nose normal.   Mouth/Throat: Oropharynx is clear and moist. No oropharyngeal exudate. Eyes: Conjunctivae and EOM are normal. Pupils are equal, round, and reactive to light. Right eye exhibits no discharge. Left eye exhibits no discharge. No scleral icterus. Neck: Normal range of motion. Neck supple. No JVD present. No tracheal deviation present. Cardiovascular: Normal rate, regular rhythm, normal heart sounds and intact distal pulses. Exam reveals no gallop and no friction rub. No murmur heard. Pulmonary/Chest: Effort normal and breath sounds normal. No stridor. No respiratory distress. She has no decreased breath sounds. She has no wheezes. She has no rhonchi. She has no rales. She exhibits no tenderness. Abdominal: Soft. Bowel sounds are normal. She exhibits no distension. There is no tenderness. There is no rebound and no guarding. Musculoskeletal: Normal range of motion. She exhibits tenderness. She exhibits no edema. Left hand: She exhibits tenderness and laceration. Hands:  Neurological: She is alert and oriented to person, place, and time. She has normal strength and normal reflexes. No cranial nerve deficit or sensory deficit. She exhibits normal muscle tone. Coordination normal. GCS eye subscore is 4. GCS verbal subscore is 5. GCS motor subscore is 6. Skin: Skin is warm and dry. Laceration noted. She is not diaphoretic. No pallor. Left hand: 2 cm linear laceration between MCP and PIP joint. No decrease ROM or strength. Tendon exposed but no obvious laceration of tendon. Psychiatric: She has a normal mood and affect. Her behavior is normal. Judgment and thought content normal.   Nursing note and vitals reviewed.   Note written by Saul Browning, as dictated by Cassandra Camacho DO 2:36 AM     MDM  Number of Diagnoses or Management Options  Laceration of finger, initial encounter:   Risk of Complications, Morbidity, and/or Mortality  Presenting problems: low  Diagnostic procedures: low  Management options: low    Patient Progress  Patient progress: stable    ED Course       Wound Repair  Date/Time: 2/14/2017 2:45 AM  Performed by: attendingPreparation: skin prepped with Betadine  Pre-procedure re-eval: Immediately prior to the procedure, the patient was reevaluated and found suitable for the planned procedure and any planned medications. Time out: Immediately prior to the procedure a time out was called to verify the correct patient, procedure, equipment, staff and marking as appropriate. .  Location details: left index finger  Wound length:2.5 cm or less (2 cm)  Anesthesia: digital block    Anesthesia:  Anesthesia: digital block  Local Anesthetic: lidocaine 2% without epinephrine   Anesthetic total: 4 mL  Foreign bodies: no foreign bodies  Irrigation solution: saline  Irrigation method: syringe  Debridement: none  Skin closure: 5-0 nylon  Number of sutures: 6  Technique: simple and interrupted  Approximation: close  Dressing: antibiotic ointment and tube gauze  Patient tolerance: Patient tolerated the procedure well with no immediate complications  My total time at bedside, performing this procedure was 16-30 minutes (20 minutes). Chief Complaint   Patient presents with    Laceration       5:05 AM  The patients presenting problems have been discussed, and they are in agreement with the care plan formulated and outlined with them. I have encouraged them to ask questions as they arise throughout their visit.     MEDICATIONS GIVEN:  Medications   lidocaine (PF) (XYLOCAINE) 20 mg/mL (2 %) injection 200 mg (200 mg IntraDERMal Given by Provider 2/14/17 0723)   neomycin-bacitracnZn-polymyxnB (NEOSPORIN) ointment 1 Packet (1 Packet Topical Given 2/14/17 5522) diph,Pertuss(AC),Tet Vac-PF (BOOSTRIX) suspension 0.5 mL (0.5 mL IntraMUSCular Given 2/14/17 0317)       LABS REVIEWED:  No results found for this or any previous visit (from the past 24 hour(s)). VITAL SIGNS:  Patient Vitals for the past 12 hrs:   Temp Pulse Resp BP SpO2   02/14/17 0207 97.9 °F (36.6 °C) 100 16 159/72 98 %       RADIOLOGY RESULTS:  The following have been ordered and reviewed:  No orders to display     PROGRESS NOTES:  Discussed results and plan with patient. Patient will be discharged home with PCP followup for suture removal. Patient instructed to return to the emergency room for any worsening symptoms or any other concerns. DIAGNOSIS:    1. Laceration of finger, initial encounter        PLAN:  Follow-up Information     Follow up With Details Comments [address] Phi MD In 10 days For suture removal [address] [place] [address]  [phone]      [facility] EMERGENCY DEPT  If symptoms worsen [facility]  [phone]        Discharge Medication List as of 2/14/2017  3:19 AM      CONTINUE these medications which have CHANGED    Details   HYDROcodone-acetaminophen (NORCO) 5-325 mg per tablet Take 1 Tab by mouth every four (4) hours as needed for Pain. Max Daily Amount: 6 Tabs., Print, Disp-20 Tab, R-0         CONTINUE these medications which have NOT CHANGED    Details   bumetanide (BUMEX) 1 mg tablet One pill by mouth every morning and one pill every afternoon no later than 2:00 p.m., Print, Disp-180 Tab, R-3      carvedilol (COREG) 6.25 mg tablet Take 1 Tab by mouth two (2) times daily (with meals). , Print, Disp-180 Tab, R-3      losartan (COZAAR) 25 mg tablet Take 1 Tab by mouth nightly. , Print, Disp-90 Tab, R-3      potassium chloride SR (K-TAB) 20 mEq tablet Take 1 Tab by mouth daily. , Print, Disp-90 Tab, R-3      pravastatin (PRAVACHOL) 40 mg tablet Take 1 Tab by mouth nightly. , Print, Disp-90 Tab, R-3 ciprofloxacin HCl (CIPRO) 250 mg tablet Take  by mouth nightly., Historical Med      LACTOBACILLUS ACIDOPHILUS (PROBIOTIC PO) Take  by mouth daily. , Historical Med      omeprazole (PRILOSEC) 20 mg capsule Take 20 mg by mouth daily. , Historical Med      aspirin 81 mg chewable tablet Take 1 Tab by mouth daily. , Print, Disp-30 Tab, R-0      co-enzyme Q-10 (CO Q-10) 50 mg capsule Take 100 mg by mouth daily (after lunch). , Historical Med      imipramine (TOFRANIL) 25 mg tablet Take 25 mg by mouth nightly., Historical Med      levothyroxine (SYNTHROID) 88 mcg tablet Take 88 mcg by mouth Daily (before breakfast). , Historical Med      sertraline (ZOLOFT) 100 mg tablet Take 100 mg by mouth nightly., Historical Med      metFORMIN (GLUCOPHAGE) 1,000 mg tablet Take 1,000 mg by mouth two (2) times daily (with meals). , Historical Med      ergocalciferol (VITAMIN D2) 50,000 unit capsule Take one tablet on Monday, Wednesday and Friday., Historical Med      melatonin 10 mg cap Take 10 mg by mouth nightly., Historical Med      calcium 500 mg tab Take 500 mg by mouth daily (after lunch). , Historical Med      multivitamin (ONE A DAY) tablet Take 1 Tab by mouth daily (after lunch). , Historical Med      DOCOSAHEXANOIC ACID/EPA (FISH OIL PO) Take 1 Tab by mouth daily (after lunch). , Historical Med               ED COURSE: The patients hospital course has been uncomplicated.

## 2017-02-14 NOTE — ED NOTES
Discharged at this time. No signs or symptoms of reaction. Discharge instructions given by provider. Ambulatory upon discharge.

## 2017-02-27 DIAGNOSIS — I10 ESSENTIAL HYPERTENSION: ICD-10-CM

## 2017-02-27 DIAGNOSIS — J96.01 ACUTE RESPIRATORY FAILURE WITH HYPOXIA (HCC): ICD-10-CM

## 2017-02-27 DIAGNOSIS — I50.21 SYSTOLIC CHF, ACUTE (HCC): ICD-10-CM

## 2017-02-27 DIAGNOSIS — R06.02 SOB (SHORTNESS OF BREATH): ICD-10-CM

## 2017-02-27 NOTE — TELEPHONE ENCOUNTER
Pharmacy verified     30 day supply with refills     Pharmacist advised that [facility] told her that patient last filled this with them in 8/9/16.      Thank you, Sunshine Mccabe

## 2017-02-28 RX ORDER — POTASSIUM CHLORIDE 1500 MG/1
20 TABLET, FILM COATED, EXTENDED RELEASE ORAL DAILY
Qty: 90 TAB | Refills: 3 | Status: SHIPPED | OUTPATIENT
Start: 2017-02-28 | End: 2017-03-24 | Stop reason: SDUPTHER

## 2017-03-05 DIAGNOSIS — R06.02 SOB (SHORTNESS OF BREATH): ICD-10-CM

## 2017-03-05 RX ORDER — BUMETANIDE 1 MG/1
TABLET ORAL
Qty: 180 TAB | Refills: 3 | Status: SHIPPED | OUTPATIENT
Start: 2017-03-05 | End: 2017-03-24 | Stop reason: SDUPTHER

## 2017-03-05 RX ORDER — CARVEDILOL 6.25 MG/1
6.25 TABLET ORAL 2 TIMES DAILY WITH MEALS
Qty: 180 TAB | Refills: 3 | Status: SHIPPED | OUTPATIENT
Start: 2017-03-05 | End: 2017-03-24 | Stop reason: SDUPTHER

## 2017-03-05 RX ORDER — LOSARTAN POTASSIUM 25 MG/1
25 TABLET ORAL
Qty: 90 TAB | Refills: 3 | Status: SHIPPED | OUTPATIENT
Start: 2017-03-05 | End: 2017-03-24 | Stop reason: SDUPTHER

## 2017-03-24 DIAGNOSIS — R06.02 SOB (SHORTNESS OF BREATH): ICD-10-CM

## 2017-03-24 DIAGNOSIS — I50.21 SYSTOLIC CHF, ACUTE (HCC): ICD-10-CM

## 2017-03-24 DIAGNOSIS — J96.01 ACUTE RESPIRATORY FAILURE WITH HYPOXIA (HCC): ICD-10-CM

## 2017-03-24 DIAGNOSIS — I10 ESSENTIAL HYPERTENSION: ICD-10-CM

## 2017-03-24 RX ORDER — LOSARTAN POTASSIUM 25 MG/1
25 TABLET ORAL
Qty: 90 TAB | Refills: 3 | Status: SHIPPED | OUTPATIENT
Start: 2017-03-24 | End: 2017-04-27 | Stop reason: SDUPTHER

## 2017-03-24 RX ORDER — BUMETANIDE 1 MG/1
TABLET ORAL
Qty: 180 TAB | Refills: 3 | Status: SHIPPED | OUTPATIENT
Start: 2017-03-24 | End: 2017-04-27 | Stop reason: SDUPTHER

## 2017-03-24 RX ORDER — CARVEDILOL 6.25 MG/1
6.25 TABLET ORAL 2 TIMES DAILY WITH MEALS
Qty: 180 TAB | Refills: 3 | Status: SHIPPED | OUTPATIENT
Start: 2017-03-24 | End: 2017-04-27 | Stop reason: SDUPTHER

## 2017-03-24 RX ORDER — POTASSIUM CHLORIDE 1500 MG/1
20 TABLET, FILM COATED, EXTENDED RELEASE ORAL DAILY
Qty: 90 TAB | Refills: 3 | Status: SHIPPED | OUTPATIENT
Start: 2017-03-24 | End: 2017-06-16

## 2017-04-27 ENCOUNTER — OFFICE VISIT (OUTPATIENT)
Dept: CARDIOLOGY CLINIC | Age: 82
End: 2017-04-27

## 2017-04-27 ENCOUNTER — HOSPITAL ENCOUNTER (OUTPATIENT)
Dept: LAB | Age: 82
Discharge: HOME | End: 2017-04-27
Payer: MEDICARE

## 2017-04-27 VITALS
SYSTOLIC BLOOD PRESSURE: 120 MMHG | HEART RATE: 90 BPM | RESPIRATION RATE: 20 BRPM | BODY MASS INDEX: 28.17 KG/M2 | WEIGHT: 165 LBS | OXYGEN SATURATION: 97 % | HEIGHT: 64 IN | DIASTOLIC BLOOD PRESSURE: 63 MMHG

## 2017-04-27 DIAGNOSIS — I10 ESSENTIAL HYPERTENSION: ICD-10-CM

## 2017-04-27 DIAGNOSIS — I50.22 CHRONIC SYSTOLIC HEART FAILURE (HCC): ICD-10-CM

## 2017-04-27 DIAGNOSIS — R06.02 SOB (SHORTNESS OF BREATH): Primary | ICD-10-CM

## 2017-04-27 PROCEDURE — 80053 COMPREHEN METABOLIC PANEL: CPT

## 2017-04-27 PROCEDURE — 85027 COMPLETE CBC AUTOMATED: CPT

## 2017-04-27 PROCEDURE — 83880 ASSAY OF NATRIURETIC PEPTIDE: CPT

## 2017-04-27 RX ORDER — BUMETANIDE 1 MG/1
TABLET ORAL
Qty: 180 TAB | Refills: 3 | Status: SHIPPED | OUTPATIENT
Start: 2017-04-27 | End: 2018-02-08 | Stop reason: SDUPTHER

## 2017-04-27 RX ORDER — IMIPRAMINE HYDROCHLORIDE 25 MG/1
25 TABLET ORAL
Qty: 30 TAB | Refills: 3 | Status: CANCELLED | OUTPATIENT
Start: 2017-04-27

## 2017-04-27 RX ORDER — POTASSIUM CHLORIDE 20 MEQ/1
20 TABLET, EXTENDED RELEASE ORAL DAILY
Qty: 30 TAB | Refills: 3 | Status: SHIPPED | OUTPATIENT
Start: 2017-04-27 | End: 2019-08-03

## 2017-04-27 RX ORDER — LOSARTAN POTASSIUM 25 MG/1
25 TABLET ORAL
Qty: 90 TAB | Refills: 3 | Status: SHIPPED | OUTPATIENT
Start: 2017-04-27 | End: 2017-06-16

## 2017-04-27 RX ORDER — POTASSIUM CHLORIDE 20 MEQ/1
TABLET, EXTENDED RELEASE ORAL
COMMUNITY
Start: 2017-03-09 | End: 2017-04-27 | Stop reason: SDUPTHER

## 2017-04-27 RX ORDER — CARVEDILOL 6.25 MG/1
6.25 TABLET ORAL 2 TIMES DAILY WITH MEALS
Qty: 180 TAB | Refills: 3 | Status: SHIPPED | OUTPATIENT
Start: 2017-04-27 | End: 2018-04-16 | Stop reason: SDUPTHER

## 2017-04-27 NOTE — PROGRESS NOTES
Malia Alonzo MD. [facility]              Patient: Daisy Mcleod  : 1928      Today's Date: 2017            HISTORY OF PRESENT ILLNESS:     History of Present Illness:  Ms. Kavitha Callahan is here for follow-up. She says she is doing OK. Still has lack of energy. Breathing is OK and mood is OK. No CP.   She has had 3 falls past year - happens on days she feels well - is not dizzy or nauseated -             PAST MEDICAL HISTORY:     Past Medical History:   Diagnosis Date    Arthritis     Breast cancer ([address]     Diabetes ([facility]     Diarrhea     chronic     Dyslipidemia     GERD (gastroesophageal reflux disease)     Hypertension     LBBB (left bundle branch block)     chronic LBBB    Non-ischemic cardiomyopathy ([address]     Cath 2/16/15 - Mild (non-obstructive) CAD,LVEF 30%, 2+ MR    Seizures (HCC)     X1 post partum after first child was born- no more seizures    Skin cancer     melanoma on legs    Sleep apnea     wears O2 at night- cannot tolerate CPAP    Systolic CHF ([facility]     Admission 16    Thyroid disease          Past Surgical History:   Procedure Laterality Date    HX BREAST LUMPECTOMY  2003    right    HX CATARACT REMOVAL      bilateral    HX CHOLECYSTECTOMY      HX DILATION AND CURETTAGE  1960    HX HEART CATHETERIZATION      x2- no blockages    HX HEENT      surgery on upper jaw    HX HYSTERECTOMY  1967    HX HYSTEROSCOPY      HX OOPHORECTOMY  1967    left    HX ORTHOPAEDIC  2010    right knee replacement    HX ORTHOPAEDIC      repair torn meniscus left knee    HX ORTHOPAEDIC      repair knuckle on left pinky finger    HX OTHER SURGICAL  2000    remove benign tumore right side between rib and right lung    HX PARTIAL THYROIDECTOMY      HX UROLOGICAL      bladder surgery x2           MEDICATIONS:     Current Outpatient Prescriptions   Medication Sig Dispense Refill    bumetanide (BUMEX) 1 mg tablet One pill by mouth by 9:00 a.m. and one pill no later than 2: 00 p.m. 180 Tab 3    carvedilol (COREG) 6.25 mg tablet Take 1 Tab by mouth two (2) times daily (with meals). 180 Tab 3    pravastatin (PRAVACHOL) 40 mg tablet Take 1 Tab by mouth nightly. 90 Tab 3    LACTOBACILLUS ACIDOPHILUS (PROBIOTIC PO) Take  by mouth daily.  aspirin 81 mg chewable tablet Take 1 Tab by mouth daily. 30 Tab 0    co-enzyme Q-10 (CO Q-10) 50 mg capsule Take 100 mg by mouth daily (after lunch).  imipramine (TOFRANIL) 25 mg tablet Take 25 mg by mouth nightly.  levothyroxine (SYNTHROID) 88 mcg tablet Take 88 mcg by mouth Daily (before breakfast).  sertraline (ZOLOFT) 100 mg tablet Take 100 mg by mouth nightly.  metFORMIN (GLUCOPHAGE) 1,000 mg tablet Take 1,000 mg by mouth two (2) times daily (with meals).  ergocalciferol (VITAMIN D2) 50,000 unit capsule Take one tablet on Monday, Wednesday and Friday.  melatonin 10 mg cap Take 10 mg by mouth nightly.  calcium 500 mg tab Take 500 mg by mouth daily (after lunch).  multivitamin (ONE A DAY) tablet Take 1 Tab by mouth daily (after lunch).  DOCOSAHEXANOIC ACID/EPA (FISH OIL PO) Take 1 Tab by mouth daily (after lunch).  potassium chloride (K-DUR, KLOR-CON) 20 mEq tablet       losartan (COZAAR) 25 mg tablet Take 1 Tab by mouth nightly. 90 Tab 3    potassium chloride SR (K-TAB) 20 mEq tablet Take 1 Tab by mouth daily. 90 Tab 3    HYDROcodone-acetaminophen (NORCO) 5-325 mg per tablet Take 1 Tab by mouth every four (4) hours as needed for Pain. Max Daily Amount: 6 Tabs. 20 Tab 0    ciprofloxacin HCl (CIPRO) 250 mg tablet Take  by mouth nightly.  omeprazole (PRILOSEC) 20 mg capsule Take 20 mg by mouth daily.          Allergies   Allergen Reactions    Beta-Blockers (Beta-Adrenergic Blocking Agts) Unknown (comments)     Made her feel tired, breakout on her back    Other Medication Unknown (comments)     Bromides    Phenobarbital Unknown (comments)    Sulfa (Sulfonamide Antibiotics) Swelling rash             SOCIAL HISTORY:     Social History   Substance Use Topics    Smoking status: Never Smoker    Smokeless tobacco: Never Used    Alcohol use 2.4 oz/week     4 Glasses of wine per week      Comment: 4         FAMILY HISTORY:     Family History   Problem Relation Age of Onset    Stroke Mother            REVIEW OF SYSTEMS:             Review of Symptoms:  Constitutional: Negative for fever, chills  HEENT: Negative for nosebleeds, tinnitus, and vision changes.    Respiratory: + HARTLEY  Cardiovascular: Negative for  Syncope    Gastrointestinal: Negative for   melena.    Genitourinary: + recent UTI    Musculoskeletal: Negative for myalgias.    Skin: Negative for rash  Heme: No problems bleeding. Neurological: Negative for speech change and focal weakness.    + HA  + cough              PHYSICAL EXAM:             Physical Exam:  Visit Vitals    /63 (BP 1 Location: Left arm, BP Patient Position: Sitting)    Pulse 90    Resp 20    Ht 5' 4\" (1.626 m)    Wt 165 lb (74.8 kg)    SpO2 97%    BMI 28.32 kg/m2          Patient appears generally well, mood and affect are appropriate and pleasant. HEENT:  Hearing intact, non-icteric, normocephalic, atraumatic.    Neck Exam: Supple, No sig JVD sitting up. No carotid bruits.    Lung Exam: Clear to auscultation, even breath sounds.    Cardiac Exam: Regular rate and rhythm with no murmur  Abdomen: Soft, non-tender    Extremities: Moves all ext well. No lower extremity edema.   Psych: Appropriate affect  Neuro - Grossly intact              LABS / OTHER STUDIES:           Lab Results   Component Value Date/Time    Sodium 141 02/02/2017 12:00 PM    Potassium 5.1 02/02/2017 12:00 PM    Chloride 97 02/02/2017 12:00 PM    CO2 24 02/02/2017 12:00 PM    Anion gap 10 02/18/2016 03:30 AM    Glucose 172 02/02/2017 12:00 PM    BUN 34 02/02/2017 12:00 PM    Creatinine 1.35 02/02/2017 12:00 PM    BUN/Creatinine ratio 25 02/02/2017 12:00 PM    GFR est AA 40 02/02/2017 12:00 PM    GFR est non-AA 35 02/02/2017 12:00 PM    Calcium 9.6 02/02/2017 12:00 PM    Bilirubin, total 0.4 02/02/2017 12:00 PM    AST (SGOT) 19 02/02/2017 12:00 PM    Alk. phosphatase 59 02/02/2017 12:00 PM    Protein, total 6.9 02/02/2017 12:00 PM    Albumin 4.5 02/02/2017 12:00 PM    Globulin 2.9 02/13/2016 05:40 AM    A-G Ratio 1.9 02/02/2017 12:00 PM    ALT (SGPT) 15 02/02/2017 12:00 PM     Lab Results   Component Value Date/Time    WBC 8.9 02/02/2017 12:00 PM    Hemoglobin (POC) 12.6 01/15/2014 12:49 PM    HGB 11.5 02/02/2017 12:00 PM    Hematocrit (POC) 37 01/15/2014 12:49 PM    HCT 34.5 02/02/2017 12:00 PM    PLATELET 662 21/98/5037 12:00 PM     02/02/2017 12:00 PM     Lab Results   Component Value Date/Time    Cholesterol, total 184 02/02/2017 12:00 PM    HDL Cholesterol 57 02/02/2017 12:00 PM    LDL, calculated 52 02/02/2017 12:00 PM    VLDL, calculated 75 02/02/2017 12:00 PM    Triglyceride 377 02/02/2017 12:00 PM     Component      Latest Ref Rng & Units 2/2/2017 6/14/2016          12:00 PM  4:58 PM   proBNP      0 - 738 pg/mL 1319 (H) 2499 (H)                              CARDIAC DIAGNOSTICS:                 Cardiac Evaluation Includes:  EKG 2/13/16 - NSR, IVCD, PRWP      Echo 2/13/16 - TDS.   LVEF 30-35%.   There was moderate diffuse hypokinesis - Only the basal anterolateral and infrolateral walls contracts normally.  RV size and function normal.  RVSP 35      CTA chest 2/13/16 - No evidence of pulmonary embolus. Mild pulmonary interstitial edema and small bilateral pleural effusions.  Small bilateral indeterminate pulmonary nodules, measuring up to 4 mm.      Cath 2/16/16 - Mild (non-obstructive) CAD,LVEF 30%, 2+ MR      Echo 4/14/16 - LVEF 30%, mild-mod MR, RVSP 28       Limited Echo 5/26/16 - LVEF 20%      Echo 7/28/16 - LVEF 25%, grade 1 diastology, mild MR, RVSP 31      BIV ICD placed 8/15/16       Echo 2/2/17 - LVEF 40%, grade 1 diastology, mod LAE, mild-mod MR, mild TR, AV sclerosis      CT head 1/4/17 -Moderate to severe atrophy. Moderate to severe deep white matter ischemic change. 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 No new confluent infarct or hemorrhage or mass effect.     EKG 12/1/15 - NSR, Nonspecific intraventricular block,                   ASSESSMENT AND PLAN:             Assessment and Plan:  1) Acute / Chronic decompensated CHF (systolic dysfunction, HFrEF)(non-ischemic CM) - discovered 2/13/16   - Echo 4/14/16 shows that LVEF still is 30%.   Echo 5/26/16 shows LVEF just 20%.     Echo 7/28/16 with LVEF 25%. - BIV ICD placed 8/15/16   - She does feel a little better since then. - On Echo 2/2/17 LVEF is better at 40%  - She is still tired   - volume status is fair on Bumex   - continue Coreg.  Due to cough, switched from lisinopril to losartan. She could not tolerate Entresto at all.    - Due to prior falls (and risk of orthostasis), will keep meds on lower doses for now   - recheck an echo in 3 months. - I strongly recommend cardiac rehab, but she declines (she says she cannot make it there)       2) HTN    - BP stable without orthostatic complaints (although has random falls)   - continue meds   - she denies dizziness although has had a couple of falls - has an ICD now - will refer to Neurology to make sure we are not missing anything - of note, she is at risk of orthostasis      3) See me in 3 months. Patient expressed understanding of the plan - questions were answered.       On a side note, she is from [place] and worked for years as a .           Doreen Shaw MD, [address]  [address], [address] [address] [address]  [address]  [facility], [address] [place] [address]  Ph: [phone]                                [phone]

## 2017-04-27 NOTE — PROGRESS NOTES
Visit Vitals    /63 (BP 1 Location: Left arm, BP Patient Position: Sitting)    Pulse 90    Resp 20    Ht 5' 4\" (1.626 m)    Wt 165 lb (74.8 kg)    SpO2 97%    BMI 28.32 kg/m2       Pt whats to talk to  About medication and some falls that she has had over the past few months.

## 2017-04-28 LAB
ALBUMIN SERPL-MCNC: 4.4 G/DL (ref 3.5–4.7)
ALBUMIN/GLOB SERPL: 2 {RATIO} (ref 1.2–2.2)
ALP SERPL-CCNC: 59 IU/L (ref 39–117)
ALT SERPL-CCNC: 19 IU/L (ref 0–32)
AST SERPL-CCNC: 27 IU/L (ref 0–40)
BILIRUB SERPL-MCNC: 0.6 MG/DL (ref 0–1.2)
BUN SERPL-MCNC: 27 MG/DL (ref 8–27)
BUN/CREAT SERPL: 22 (ref 12–28)
CALCIUM SERPL-MCNC: 9.7 MG/DL (ref 8.7–10.3)
CHLORIDE SERPL-SCNC: 97 MMOL/L (ref 96–106)
CO2 SERPL-SCNC: 25 MMOL/L (ref 18–29)
CREAT SERPL-MCNC: 1.21 MG/DL (ref 0.57–1)
ERYTHROCYTE [DISTWIDTH] IN BLOOD BY AUTOMATED COUNT: 15.2 % (ref 12.3–15.4)
GLOBULIN SER CALC-MCNC: 2.2 G/DL (ref 1.5–4.5)
GLUCOSE SERPL-MCNC: 140 MG/DL (ref 65–99)
HCT VFR BLD AUTO: 34.7 % (ref 34–46.6)
HGB BLD-MCNC: 11.5 G/DL (ref 11.1–15.9)
INTERPRETATION: NORMAL
MCH RBC QN AUTO: 33 PG (ref 26.6–33)
MCHC RBC AUTO-ENTMCNC: 33.1 G/DL (ref 31.5–35.7)
MCV RBC AUTO: 100 FL (ref 79–97)
NT-PROBNP SERPL-MCNC: 1277 PG/ML (ref 0–738)
PLATELET # BLD AUTO: 261 X10E3/UL (ref 150–379)
POTASSIUM SERPL-SCNC: 4 MMOL/L (ref 3.5–5.2)
PROT SERPL-MCNC: 6.6 G/DL (ref 6–8.5)
RBC # BLD AUTO: 3.48 X10E6/UL (ref 3.77–5.28)
SODIUM SERPL-SCNC: 143 MMOL/L (ref 134–144)
WBC # BLD AUTO: 6.8 X10E3/UL (ref 3.4–10.8)

## 2017-05-15 ENCOUNTER — TELEPHONE (OUTPATIENT)
Dept: CARDIOLOGY CLINIC | Age: 82
End: 2017-05-15

## 2017-05-15 NOTE — TELEPHONE ENCOUNTER
----- Message from Christel Marques MD sent at 4/27/2017  4:17 PM EDT -----  Regarding: refer to neurology  Please refer to neurology for falls without warning.      Thanks,  SK

## 2017-05-15 NOTE — LETTER
5/15/2017 12:50 PM 
 
Ms. Emma Ochoa [address] [address] Dear Ms Tyrell Davis, 
     Dr Asif Gonzalez is interested in having you see a neurologist about your problem with falling. I got you an appointment to see Dr Tatyana Dang (man) at the [facility] on May 19 at 9:30 AM.  Your arrival time is 9 AM. The phone number [phone]. The address is [address] [place] [address] [employer]. Sincerely, Finley Bumpers, MD

## 2017-06-07 ENCOUNTER — CLINICAL SUPPORT (OUTPATIENT)
Dept: CARDIOLOGY CLINIC | Age: 82
End: 2017-06-07

## 2017-06-07 DIAGNOSIS — Z95.810 PRESENCE OF AUTOMATIC CARDIOVERTER/DEFIBRILLATOR (AICD): Primary | ICD-10-CM

## 2017-06-16 ENCOUNTER — OFFICE VISIT (OUTPATIENT)
Dept: NEUROLOGY | Age: 82
End: 2017-06-16

## 2017-06-16 VITALS
BODY MASS INDEX: 28.48 KG/M2 | OXYGEN SATURATION: 96 % | TEMPERATURE: 98.6 F | HEIGHT: 64 IN | WEIGHT: 166.8 LBS | HEART RATE: 87 BPM | DIASTOLIC BLOOD PRESSURE: 72 MMHG | SYSTOLIC BLOOD PRESSURE: 120 MMHG | RESPIRATION RATE: 15 BRPM

## 2017-06-16 DIAGNOSIS — R26.89 IMBALANCE: ICD-10-CM

## 2017-06-16 DIAGNOSIS — R42 DIZZY: Primary | ICD-10-CM

## 2017-06-16 DIAGNOSIS — R42 DIZZY: ICD-10-CM
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
## 2017-06-16 RX ORDER — LISINOPRIL 5 MG/1
TABLET ORAL DAILY
COMMUNITY
End: 2018-01-03 | Stop reason: ALTCHOICE

## 2017-06-16 NOTE — PROGRESS NOTES
New patient presenting with frequent falls. Report no lightheadedness or warning of falls. reports she falls forward and her hands do not come up to break fall. Reports 3-5 falls this year.

## 2017-06-16 NOTE — PATIENT INSTRUCTIONS
Information Regarding Testing     If you have physican order for a test or a medication denied by your insurance company, this does not mean the test or medication is not appropriate for you as that is a medical decision, not a decision to be made by an insurance company representative or by an [facility] physician who has not interviewed and examined you. This is a decision to be made between you and your physician. The denial of services is a contractual matter between you and your insurance company, not an issue between your physician and the insurance company. If your test or medication is denied, you can take the following steps to help resolve the issue:    1. File a complaint with the [facility] regarding your insurance company's denial of services ordered for you. You can do this either by calling them directly or by completing an on-line complaint form on the [employer]. This can be found at www.[employer] ([employer])    2. Also file a formal complaint with your insurance company and ask to have the name of the person denying the service so that you may explore a legal option should you be harmed by this denial of service. Again, the fact the insurance company will not pay for the service does not mean it is not medically necessary and I would encourage you to follow through with the plan that was made with your physician    3. File a written complaint with your employer so your employer and benefit manager is aware of the poor coverage they are providing their employees. If you have medicare/medicaid, complain to your representative in the House and to your [place].     [address] [facility]   Statement to Patients  April 1, 2014      In an effort to ensure the large volume of patient prescription refills is processed in the most efficient and expeditious manner, we are asking our patients to assist us by calling your Pharmacy for all prescription refills, this will include also your  Mail Order Pharmacy. The pharmacy will contact our office electronically to continue the refill process. Please do not wait until the last minute to call your pharmacy. We need at least 48 hours (2days) to fill prescriptions. We also encourage you to call your pharmacy before going to  your prescription to make sure it is ready. With regard to controlled substance prescription refill requests (narcotic refills) that need to be picked up at our office, we ask your cooperation by providing us with at least 72 hours (3days) notice that you will need a refill. We will not refill narcotic prescription refill requests after 4:00pm on any weekday, Monday through Thursday, or after 2:00pm on Fridays, or on the weekends. We encourage everyone to explore another way of getting your prescription refill request processed using [employer], our patient web portal through our electronic medical record system. [employer] is an efficient and effective way to communicate your medication request directly to the office and  downloadable as an reyna on your smart phone . [employer] also features a review functionality that allows you to view your medication list as well as leave messages for your physician. Are you ready to get connected? If so please review the attatched instructions or speak to any of our staff to get you set up right away! Thank you so much for your cooperation. Should you have any questions please contact our Practice Administrator. The Physicians and Staff,  [facility]     If we have ordered testing for you, we do not call patients with results and we do not give test results over the phone. We schedule follow up appointments so that your results can be discussed in person and any questions you have regarding them may be addressed.   If something of concern is revealed on your test, we will call you for a sooner follow up appointment. Additionally, results may be found by using the My Chart feature and one of our patient service representatives at the  can give you instructions on how to access this feature of our electronic medical record system. Learning About [place]  What is a living will? A living will is a legal form you use to write down the kind of care you want at the end of your life. It is used by the health professionals who will treat you if you aren't able to decide for yourself. If you put your wishes in writing, your loved ones and others will know what kind of care you want. They won't need to guess. This can ease your mind and be helpful to others. A living will is not the same as an estate or property will. An estate will explains what you want to happen with your money and property after you die. Is a living will a legal document? A living will is a legal document. Each state has its own laws about living terry. If you move to another state, make sure that your living will is legal in the state where you now live. Or you might use a universal form that has been approved by many states. This kind of form can sometimes be completed and stored online. Your electronic copy will then be available wherever you have a connection to the Internet. In most cases, doctors will respect your wishes even if you have a form from a different state. · You don't need an  to complete a living will. But legal advice can be helpful if your state's laws are unclear, your health history is complicated, or your family can't agree on what should be in your living will. · You can change your living will at any time. Some people find that their wishes about end-of-life care change as their health changes. · In addition to making a living will, think about completing a medical power of  form.  This form lets you name the person you want to make end-of-life treatment decisions for you (your \"health care agent\") if you're not able to. Many hospitals and nursing homes will give you the forms you need to complete a living will and a medical power of . · Your living will is used only if you can't make or communicate decisions for yourself anymore. If you become able to make decisions again, you can accept or refuse any treatment, no matter what you wrote in your living will. · Your state may offer an online registry. This is a place where you can store your living will online so the doctors and nurses who need to treat you can find it right away. What should you think about when creating a living will? Talk about your end-of-life wishes with your family members and your doctor. Let them know what you want. That way the people making decisions for you won't be surprised by your choices. Think about these questions as you make your living will:  · Do you know enough about life support methods that might be used? If not, talk to your doctor so you know what might be done if you can't breathe on your own, your heart stops, or you're unable to swallow. · What things would you still want to be able to do after you receive life-support methods? Would you want to be able to walk? To speak? To eat on your own? To live without the help of machines? · If you have a choice, where do you want to be cared for? In your home? At a hospital or nursing home? · Do you want certain [employer] practices performed if you become very ill? · If you have a choice at the end of your life, where would you prefer to die? At home? In a hospital or nursing home? Somewhere else? · Would you prefer to be buried or cremated? · Do you want your organs to be donated after you die? What should you do with your living will? · Make sure that your family members and your health care agent have copies of your living will. · Give your doctor a copy of your living will to keep in your medical record.  If you have more than one doctor, make sure that each one has a copy. · You may want to put a copy of your living will where it can be easily found. Where can you learn more? Go to http://smita-armand.info/. Enter H113 in the search box to learn more about \"Learning About Living Don Flavin. \"  Current as of: February 24, 2016  Content Version: 11.2  © 9367-8639 [employer]. Care instructions adapted under license by [employer] (which disclaims liability or warranty for this information). If you have questions about a medical condition or this instruction, always ask your healthcare professional. [address] any warranty or liability for your use of this information.

## 2017-06-16 NOTE — PROGRESS NOTES
[address] [place] [address]. [address]   [place] [address] [facility]   [address]   [phone] [facility]   [phone] Fax             Referring: Dr Jericho Rm    Chief Complaint   Patient presents with   [place]     new patient     80-year-old right-handed woman who comes today for evaluation of what she calls falling without warning. She tells me that over the last year she has had 3 falls. She notes that her last fall was in January. She notes that she was packing for a cruise to [place]. She was rushing because she had 3 days before she went on the cruise. She was walking slowly. She was in her bedroom packing and then she just fell forward flat on her face where she had a nosebleed and the cleaning lady was there who activated EMS. She was taken to the hospital.  She had 2 black eyes. She had CT scan of the head performed her that was fine. She went on her cruise. She notes that she had one fall in the bank after she was excited after having a large balance from something that she was anticipating and she was walking back to her car and she fell forward again. She says she always falls forward. She never seems to get her arms up to protect herself. She typically falls flat on her face. She gets no warning. She says that she does not feel woozy. She notes that there is no positional issue with this. She has no loss of consciousness with it. No palpitations chest pain shortness of breath. Does not seem to occur when she is ill says that it really occurs when she is feeling her best.  She does note that she was diagnosed with CHF about a year and a half ago and notes that she had to get a pacemaker and ICD placed secondary to that. She is also had knee replacements. No tremor. No shuffling.       Past Medical History:   Diagnosis Date    Arthritis     [facility]     Breast cancer [facility]     Diabetes ([facility]     Diarrhea     chronic     Dyslipidemia     GERD (gastroesophageal reflux disease)     Hearing loss     Hypertension     LBBB (left bundle branch block)     chronic LBBB    [facility]anoma [facility]     Non-ischemic cardiomyopathy ([facility]     Cath 2/16/15 - Mild (non-obstructive) CAD,LVEF 30%, 2+ MR    Seizures (HCC)     X1 post partum after first child was born- no more seizures    Skin cancer     melanoma on legs    Sleep apnea     wears O2 at night- cannot tolerate CPAP    Snoring     SOB (shortness of breath)     Systolic CHF ([facility]     Admission 2/13/16    Thyroid disease        Past Surgical History:   Procedure Laterality Date    HX BREAST LUMPECTOMY  2003    right    HX CATARACT REMOVAL  1994    bilateral    HX CHOLECYSTECTOMY      HX DILATION AND CURETTAGE  1960    HX HEART CATHETERIZATION      x2- no blockages    HX HEENT  2004    surgery on upper jaw    HX HYSTERECTOMY  1967    HX HYSTEROSCOPY      HX OOPHORECTOMY  1967    left    HX ORTHOPAEDIC  2010    right knee replacement    HX ORTHOPAEDIC      repair torn meniscus left knee    HX ORTHOPAEDIC  1996    repair knuckle on left pinky finger    HX OTHER SURGICAL  2000    remove benign tumore right side between rib and right lung    HX PACEMAKER      HX PARTIAL THYROIDECTOMY      HX UROLOGICAL  2003    bladder surgery x2       Current Outpatient Prescriptions   Medication Sig Dispense Refill    lisinopril (PRINIVIL, ZESTRIL) 5 mg tablet Take  by mouth daily.  potassium chloride (K-DUR, KLOR-CON) 20 mEq tablet Take 1 Tab by mouth daily. 30 Tab 3    bumetanide (BUMEX) 1 mg tablet One pill by mouth by 9:00 a.m. and one pill no later than 2:00 p.m. 180 Tab 3    carvedilol (COREG) 6.25 mg tablet Take 1 Tab by mouth two (2) times daily (with meals). 180 Tab 3    pravastatin (PRAVACHOL) 40 mg tablet Take 1 Tab by mouth nightly. 90 Tab 3    LACTOBACILLUS ACIDOPHILUS (PROBIOTIC PO) Take  by mouth daily.  omeprazole (PRILOSEC) 20 mg capsule Take 20 mg by mouth daily.       aspirin 81 mg chewable tablet Take 1 Tab by mouth daily. 30 Tab 0    co-enzyme Q-10 (CO Q-10) 50 mg capsule Take 100 mg by mouth daily (after lunch).  imipramine (TOFRANIL) 25 mg tablet Take 25 mg by mouth nightly.  levothyroxine (SYNTHROID) 88 mcg tablet Take 88 mcg by mouth Daily (before breakfast).  sertraline (ZOLOFT) 100 mg tablet Take 100 mg by mouth nightly.  metFORMIN (GLUCOPHAGE) 1,000 mg tablet Take 1,000 mg by mouth two (2) times daily (with meals).  ergocalciferol (VITAMIN D2) 50,000 unit capsule Take one tablet on Monday, Wednesday and Friday.  melatonin 10 mg cap Take 10 mg by mouth nightly.  multivitamin (ONE A DAY) tablet Take 1 Tab by mouth daily (after lunch).  DOCOSAHEXANOIC ACID/EPA (FISH OIL PO) Take 1 Tab by mouth daily (after lunch). Allergies   Allergen Reactions    Beta-Blockers (Beta-Adrenergic Blocking Agts) Unknown (comments)     Made her feel tired, breakout on her back    Other Medication Unknown (comments)     Bromides    Phenobarbital Unknown (comments)    Sulfa (Sulfonamide Antibiotics) Swelling     rash       Social History   Substance Use Topics    Smoking status: Never Smoker    Smokeless tobacco: Never Used    Alcohol use Yes      Comment: 4       Family History   Problem Relation Age of Onset    Stroke Mother      Review of Systems  Pertinent positives and negatives are as noted otherwise comprehensive review is negative. Examination  Visit Vitals    /72    Pulse 87    Temp 98.6 °F (37 °C)    Resp 15    Ht 5' 4\" (1.626 m)    Wt 75.7 kg (166 lb 12.8 oz)    SpO2 96%    BMI 28.63 kg/m2     Pleasant, well appearing elderly. No icterus. Oropharynx clear. Supple neck without bruit. Heart regular. No murmur. No edema. She is awake alert and cognitively spry. She discusses her medical history or medications current events. She is fluent. She has full versions without nystagmus.   Symmetric face and facial sensation. Tongue and palate are midline. Visual fields full to confrontation. Disc margins not well seen. Age-related paratonia. No abnormal movement. No pronation or drift. She resists fully in the upper and lower extremities in all muscle groups to direct testing. Reflexes globally depressed. She is wearing wedge shaped shoes today with about a 2 inch heel and she tells me these are not high however she is a bit unstable particularly in her turn. She is tentative with her gait. No Romberg sign is present. Sensory is intact to primary modalities    Impression/Plan  20-year-old lady with several falls where she just goes face first and says she has no warning with an examination demonstrative of decreased balance likely age-related. We discussed this. She has had a CT scan of her head done a few months back and she cannot get an MRI with her pacemaker so were just going to defer repeating a scan because I do not think is going to add much to the situation. I will get a carotid Doppler and an EEG. We will send her over to [facility] for some balance and gait therapy and she notes that she does see the therapist at [address] who told her that her balance was bad but she does not have a home exercise program and that is what she needs. She also likely needs to use an assistive device although I am not very encouraged that she would use 1. Follow after. This note was created using voice recognition software. Despite editing, there may be syntax errors. This note will not be viewable in [address]

## 2017-06-16 NOTE — MR AVS SNAPSHOT
Visit Information Date & Time Provider Department Dept. Phone Encounter #  
 6/16/2017  2:30 PM MD Jc Oliveira [facility] Neurology [facility] [phone] [id number] Follow-up Instructions Return for After tests . Your Appointments 8/10/2017  3:00 PM  
ECHO CARDIOGRAMS 2D with ECHO, STFRANCIS  
[facility] (ADELA SCHEDULING) Appt Note: echo at 3pm per dr Palumbo Seats at [facility] [address] [facility] [address] [address]  
[phone]  
  
   
 [address] [address]  
  
    
 8/10/2017  4:00 PM  
ESTABLISHED PATIENT with Carmen Garces MD  
[facility] ([address]) Appt Note: echo at 3pm per dr Palumbo Seats at [facility] [address] [facility]  
[phone]  
  
   
 [address] [address]  
  
    
 9/20/2017  2:30 PM  
PACEMAKER with PACEMAKER, JAMES  
[facility] (ADELA SCHEDULING) Appt Note: bhavya sci ICD/stf  
 [address]  
[address] [facility] [place] [address] [address] Upcoming Health Maintenance Date Due  
 FOOT EXAM Q1 7/5/1938 EYE EXAM RETINAL OR DILATED Q1 7/5/1938 ZOSTER VACCINE AGE 60> 7/5/1988 GLAUCOMA SCREENING Q2Y 7/5/1993 Pneumococcal 65+ Low/Medium Risk (1 of 2 - PCV13) 7/5/1993 MEDICARE YEARLY EXAM 7/5/1993 MICROALBUMIN Q1 1/19/2017 INFLUENZA AGE 9 TO ADULT 8/1/2017 HEMOGLOBIN A1C Q6M 8/2/2017 LIPID PANEL Q1 2/2/2018 DTaP/Tdap/Td series (2 - Td) 2/14/2027 Allergies as of 6/16/2017  Review Complete On: 6/16/2017 By: Gita Givens MD  
  
 Severity Noted Reaction Type Reactions Beta-blockers (Beta-adrenergic Blocking Agts)  12/01/2015    Unknown (comments) Made her feel tired, breakout on her back Other Medication  12/01/2015    Unknown (comments) Bromides Phenobarbital  12/01/2015    Unknown (comments) Sulfa (Sulfonamide Antibiotics)  01/15/2014    Swelling  
 rash Current Immunizations  Never Reviewed Name Date Tdap 2/14/2017  3:17 AM  
  
 Not reviewed this visit You Were Diagnosed With   
  
 Codes Comments [place], initial encounter    -  Primary ICD-10-CM: W19. Janet Gomez ICD-9-CM: E888.9 Imbalance     ICD-10-CM: R26.89 
ICD-9-CM: 781.2 Dizzy     ICD-10-CM: R44 ICD-9-CM: 780.4 Vitals BP Pulse Temp Resp Height(growth percentile) Weight(growth percentile) 120/72 87 98.6 °F (37 °C) 15 5' 4\" (1.626 m) 166 lb 12.8 oz (75.7 kg) SpO2 BMI OB Status Smoking Status 96% 28.63 kg/m2 Hysterectomy Never Smoker Vitals History BMI and BSA Data Body Mass Index Body Surface Area  
 28.63 kg/m 2 1.85 m 2 Preferred Pharmacy Pharmacy Name Phone [address] [facility] [phone] Your Updated Medication List  
  
   
This list is accurate as of: 6/16/17  3:32 PM.  Always use your most recent med list.  
  
  
  
  
 aspirin 81 mg chewable tablet Take 1 Tab by mouth daily. bumetanide 1 mg tablet Commonly known as:  Regan Servin One pill by mouth by 9:00 a.m. and one pill no later than 2:00 p.m.  
  
 carvedilol 6.25 mg tablet Commonly known as:  Darmatthewa Magbarielle Take 1 Tab by mouth two (2) times daily (with meals). co-enzyme Q-10 50 mg capsule Commonly known as:  CO Q-10 Take 100 mg by mouth daily (after lunch). FISH OIL PO Take 1 Tab by mouth daily (after lunch). imipramine 25 mg tablet Commonly known as:  TOFRANIL Take 25 mg by mouth nightly. lisinopril 5 mg tablet Commonly known as:  Namrata Gaster Take  by mouth daily. melatonin 10 mg Cap Take 10 mg by mouth nightly. metFORMIN 1,000 mg tablet Commonly known as:  GLUCOPHAGE  
 Take 1,000 mg by mouth two (2) times daily (with meals). multivitamin tablet Commonly known as:  ONE A DAY Take 1 Tab by mouth daily (after lunch). omeprazole 20 mg capsule Commonly known as:  PRILOSEC Take 20 mg by mouth daily. potassium chloride 20 mEq tablet Commonly known as:  K-DUR, KLOR-CON Take 1 Tab by mouth daily. pravastatin 40 mg tablet Commonly known as:  PRAVACHOL Take 1 Tab by mouth nightly. PROBIOTIC PO Take  by mouth daily. sertraline 100 mg tablet Commonly known as:  ZOLOFT Take 100 mg by mouth nightly. SYNTHROID 88 mcg tablet Generic drug:  levothyroxine Take 88 mcg by mouth Daily (before breakfast). VITAMIN D2 50,000 unit capsule Generic drug:  ergocalciferol Take one tablet on Monday, Wednesday and Friday. We Performed the Following REFERRAL TO PHYSICAL THERAPY [PVF35 Custom] Comments:  
 Please evaluate patient for  Gait and balance SAH Follow-up Instructions Return for After tests . To-Do List   
 06/16/2017 Imaging:  DUPLEX CAROTID BILATERAL AMB NEURO   
  
 06/16/2017 Neurology:  EEG Referral Information Referral ID Referred By Referred To  
  
 [id number] KAREEM LUCIO Not Available Visits Status Start Date End Date 1 New Request 6/16/17 6/16/18 If your referral has a status of pending review or denied, additional information will be sent to support the outcome of this decision. Patient Instructions Information Regarding Testing If you have physican order for a test or a medication denied by your insurance company, this does not mean the test or medication is not appropriate for you as that is a medical decision, not a decision to be made by an insurance company representative or by an [facility] physician who has not interviewed and examined you. This is a decision to be made between you and your physician. The denial of services is a contractual matter between you and your insurance company, not an issue between your physician and the insurance company. If your test or medication is denied, you can take the following steps to help resolve the issue: 1. File a complaint with the [facility] [employer] regarding your insurance company's denial of services ordered for you. You can do this either by calling them directly or by completing an on-line complaint form on the [employer]. This can be found at [email or web address] 2. Also file a formal complaint with your insurance company and ask to have the name of the person denying the service so that you may explore a legal option should you be harmed by this denial of service. Again, the fact the insurance company will not pay for the service does not mean it is not medically necessary and I would encourage you to follow through with the plan that was made with your physician 3. File a written complaint with your employer so your employer and benefit manager is aware of the poor coverage they are providing their employees. If you have medicare/medicaid, complain to your representative in the House and to your [place]. PRESCRIPTION REFILL POLICY [facility] Statement to Patients April 1, 2014 In an effort to ensure the large volume of patient prescription refills is processed in the most efficient and expeditious manner, we are asking our patients to assist us by calling your Pharmacy for all prescription refills, this will include also your  Mail Order Pharmacy. The pharmacy will contact our office electronically to continue the refill process. Please do not wait until the last minute to call your pharmacy. We need at least 48 hours (2days) to fill prescriptions. We also encourage you to call your pharmacy before going to  your prescription to make sure it is ready. With regard to controlled substance prescription refill requests (narcotic refills) that need to be picked up at our office, we ask your cooperation by providing us with at least 72 hours (3days) notice that you will need a refill. We will not refill narcotic prescription refill requests after 4:00pm on any weekday, Monday through Thursday, or after 2:00pm on Fridays, or on the weekends. We encourage everyone to explore another way of getting your prescription refill request processed using [facility], our patient web portal through our electronic medical record system. [facility] is an efficient and effective way to communicate your medication request directly to the office and  downloadable as an reyna on your smart phone . [facility] also features a review functionality that allows you to view your medication list as well as leave messages for your physician. Are you ready to get connected? If so please review the attatched instructions or speak to any of our staff to get you set up right away! Thank you so much for your cooperation. Should you have any questions please contact our Practice Administrator. The Physicians and Staff,  [facility] If we have ordered testing for you, we do not call patients with results and we do not give test results over the phone. We schedule follow up appointments so that your results can be discussed in person and any questions you have regarding them may be addressed. If something of concern is revealed on your test, we will call you for a sooner follow up appointment. Additionally, results may be found by using the My Chart feature and one of our patient service representatives at the  can give you instructions on how to access this feature of our electronic medical record system. Claribel Guadalupe 1722 What is a living will?  
A living will is a legal form you use to write down the kind of care you want at the end of your life. It is used by the health professionals who will treat you if you aren't able to decide for yourself. If you put your wishes in writing, your loved ones and others will know what kind of care you want. They won't need to guess. This can ease your mind and be helpful to others. A living will is not the same as an estate or property will. An estate will explains what you want to happen with your money and property after you die. Is a living will a legal document? A living will is a legal document. Each state has its own laws about living terry. If you move to another state, make sure that your living will is legal in the state where you now live. Or you might use a universal form that has been approved by many states. This kind of form can sometimes be completed and stored online. Your electronic copy will then be available wherever you have a connection to the Internet. In most cases, doctors will respect your wishes even if you have a form from a different state. · You don't need an  to complete a living will. But legal advice can be helpful if your state's laws are unclear, your health history is complicated, or your family can't agree on what should be in your living will. · You can change your living will at any time. Some people find that their wishes about end-of-life care change as their health changes. · In addition to making a living will, think about completing a medical power of  form. This form lets you name the person you want to make end-of-life treatment decisions for you (your \"health care agent\") if you're not able to. Many hospitals and nursing homes will give you the forms you need to complete a living will and a medical power of . · Your living will is used only if you can't make or communicate decisions for yourself anymore.  If you become able to make decisions again, you can accept or refuse any treatment, no matter what you wrote in your living will. · Your state may offer an online registry. This is a place where you can store your living will online so the doctors and nurses who need to treat you can find it right away. What should you think about when creating a living will? Talk about your end-of-life wishes with your family members and your doctor. Let them know what you want. That way the people making decisions for you won't be surprised by your choices. Think about these questions as you make your living will: · Do you know enough about life support methods that might be used? If not, talk to your doctor so you know what might be done if you can't breathe on your own, your heart stops, or you're unable to swallow. · What things would you still want to be able to do after you receive life-support methods? Would you want to be able to walk? To speak? To eat on your own? To live without the help of machines? · If you have a choice, where do you want to be cared for? In your home? At a hospital or nursing home? · Do you want certain Sikh practices performed if you become very ill? · If you have a choice at the end of your life, where would you prefer to die? At home? In a hospital or nursing home? Somewhere else? · Would you prefer to be buried or cremated? · Do you want your organs to be donated after you die? What should you do with your living will? · Make sure that your family members and your health care agent have copies of your living will. · Give your doctor a copy of your living will to keep in your medical record. If you have more than one doctor, make sure that each one has a copy. · You may want to put a copy of your living will where it can be easily found. Where can you learn more? Go to http://smita-armand.info/. Enter T088 in the search box to learn more about \"Learning About Living Blank [place]. \" 
 Current as of: February 24, 2016 Content Version: 11.2 © 7184-1041 Eligible, [employer]. Care instructions adapted under license by [employer] (which disclaims liability or warranty for this information). If you have questions about a medical condition or this instruction, always ask your healthcare professional. [address] any warranty or liability for your use of this information. Please provide this summary of care documentation to your next provider. Your primary care clinician is listed as Patricia Bernstein. If you have any questions after today's visit, please call [phone].

## 2017-06-17 NOTE — PROGRESS NOTES
Carotid Doppler:     Date:  06/16/17    Requesting Physician:  Stormy Brewster. MD Gabby     Indication:  Dizziness. B-mode imaging reveals mixed plaque at the bifurcations extending into the internal carotid segments bilaterally. Doppler spectral analysis reveals no significantly elevated velocities. Vertebral artery flow antegrade bilaterally. Interpretation:  Plaque as noted. Stenosis estimated at 16-49% bilateral ICA.

## 2017-06-26 ENCOUNTER — HOSPITAL ENCOUNTER (OUTPATIENT)
Dept: NEUROLOGY | Age: 82
Discharge: HOME | End: 2017-06-26
Attending: PSYCHIATRY & NEUROLOGY
Payer: MEDICARE

## 2017-06-26 DIAGNOSIS — R26.89 IMBALANCE: ICD-10-CM
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
## 2017-06-26 DIAGNOSIS — R42 DIZZY: ICD-10-CM

## 2017-06-26 PROCEDURE — 95816 EEG AWAKE AND DROWSY: CPT

## 2017-07-03 NOTE — PROCEDURES
[facility] Bl[facility] [address]   [address] [facility], [address]   ROUTINE EEG REPORT       Name:  Naga Mccoy   MR#:  [id number]   :  1928   Account #:  [de-identified]    Date of Procedure:  2017   Date of Adm:  2017       REQUESTING PHYSICIAN: Nancy Pierre MD.     An EEG is requested in this 80year-old woman with dizziness to   evaluate for epileptiform abnormalities. Medications listed as Prinivil,   Bumex, Coreg, Pravachol. This tracing is obtained during the awake state. During wakefulness,   the background consists of diffuse low voltage fast frequency beta   wave activity. Hyperventilation not performed secondary to age and medical history. intermittent photic stimulation induces symmetric posterior driving responses at   faster frequencies. Sleep is not attained. INTERPRETATION: This EEG recorded during the awake state is   normal. No epileptiform abnormalities are seen.          MD LANA Guzman / Tamika Loomis   D:  2017   10:44   T:  2017   12:02   Job #:  [id number]

## 2017-09-13 ENCOUNTER — CLINICAL SUPPORT (OUTPATIENT)
Dept: CARDIOLOGY CLINIC | Age: 82
End: 2017-09-13

## 2017-09-13 DIAGNOSIS — Z95.810 PRESENCE OF AUTOMATIC CARDIOVERTER/DEFIBRILLATOR (AICD): Primary | ICD-10-CM

## 2017-12-05 ENCOUNTER — HOSPITAL ENCOUNTER (OUTPATIENT)
Dept: MAMMOGRAPHY | Age: 82
Discharge: HOME | End: 2017-12-05
Payer: MEDICARE

## 2017-12-05 DIAGNOSIS — Z12.31 VISIT FOR SCREENING MAMMOGRAM: ICD-10-CM

## 2017-12-05 PROCEDURE — 77067 SCR MAMMO BI INCL CAD: CPT

## 2018-01-03 ENCOUNTER — OFFICE VISIT (OUTPATIENT)
Dept: CARDIOLOGY CLINIC | Age: 83
End: 2018-01-03

## 2018-01-03 ENCOUNTER — CLINICAL SUPPORT (OUTPATIENT)
Dept: CARDIOLOGY CLINIC | Age: 83
End: 2018-01-03

## 2018-01-03 VITALS
DIASTOLIC BLOOD PRESSURE: 60 MMHG | RESPIRATION RATE: 18 BRPM | WEIGHT: 177 LBS | OXYGEN SATURATION: 94 % | HEART RATE: 76 BPM | BODY MASS INDEX: 30.22 KG/M2 | HEIGHT: 64 IN | SYSTOLIC BLOOD PRESSURE: 138 MMHG

## 2018-01-03 DIAGNOSIS — Z95.810 BIVENTRICULAR AUTOMATIC IMPLANTABLE CARDIOVERTER DEFIBRILLATOR IN SITU: Primary | ICD-10-CM

## 2018-01-03 DIAGNOSIS — I10 ESSENTIAL HYPERTENSION: ICD-10-CM

## 2018-01-03 DIAGNOSIS — Z95.810 CARDIAC DEFIBRILLATOR IN SITU: Primary | ICD-10-CM

## 2018-01-03 DIAGNOSIS — G47.30 SLEEP APNEA, UNSPECIFIED TYPE: ICD-10-CM

## 2018-01-03 DIAGNOSIS — E11.21 TYPE 2 DIABETES MELLITUS WITH NEPHROPATHY (HCC): ICD-10-CM

## 2018-01-03 RX ORDER — LOSARTAN POTASSIUM 25 MG/1
25 TABLET ORAL DAILY
COMMUNITY
Start: 2017-04-27 | End: 2018-05-18 | Stop reason: SDUPTHER

## 2018-01-03 NOTE — PROGRESS NOTES
Visit Vitals    /60 (BP 1 Location: Left arm, BP Patient Position: Sitting)    Pulse 76    Resp 18    Ht 5' 4\" (1.626 m)    Wt 177 lb (80.3 kg)    SpO2 94%    BMI 30.38 kg/m2

## 2018-01-03 NOTE — PROGRESS NOTES
HISTORY OF PRESENTING ILLNESS      Hailey Vasquez is a 80 y.o. female established patient with NICM, LVEF 20%, hypertension, NYHA class III, LBBB, biventricular ICD therapy. She continues to have shortness of breath with exertion and fatigue. Her device interrogation shows normal device functioning. Last visit,  LV offset was added and rate response was turned on given her exertional shortness of breath and due to heart rates predominantly in the 70's to 80's. She denies worsening or progression of symptoms since last visit. Echocardiogram demonstrated and EF of 40%. The patient denies chest pain, orthopnea, PND, LE edema, palpitations, syncope, presyncope or fatigue.         ACTIVE PROBLEM LIST     Patient Active Problem List    Diagnosis Date Noted    HFrEF (heart failure with reduced ejection fraction) ([facility] 08/23/2016    DM type 2 (diabetes mellitus, type 2) ([facility] 02/13/2016    Hypertension     GERD (gastroesophageal reflux disease)     Sleep apnea            PAST MEDICAL HISTORY     Past Medical History:   Diagnosis Date    Arthritis     Breast CA ([facility]     Breast cancer ([facility]     Diabetes ([facility]     Diarrhea     chronic     Dyslipidemia     GERD (gastroesophageal reflux disease)     Hearing loss     Hypertension     LBBB (left bundle branch block)     chronic LBBB    Melanoma ([address]     Non-ischemic cardiomyopathy ([facility]     Cath 2/16/15 - Mild (non-obstructive) CAD,LVEF 30%, 2+ MR    Seizures (HCC)     X1 post partum after first child was born- no more seizures    Skin cancer     melanoma on legs    Sleep apnea     wears O2 at night- cannot tolerate CPAP    Snoring     SOB (shortness of breath)     Systolic CHF ([address]     Admission 2/13/16    Thyroid disease            PAST SURGICAL HISTORY     Past Surgical History:   Procedure Laterality Date    HX BREAST LUMPECTOMY  2003    right    HX CATARACT REMOVAL  1994    bilateral    HX CHOLECYSTECTOMY      [address] [place] [address]  HX HEART CATHETERIZATION      x2- no blockages    HX HEENT  2004    surgery on upper jaw    HX HYSTERECTOMY  1967    HX HYSTEROSCOPY      HX OOPHORECTOMY  1967    left    HX ORTHOPAEDIC  2010    right knee replacement    HX ORTHOPAEDIC      repair torn meniscus left knee    HX ORTHOPAEDIC  1996    repair knuckle on left pinky finger    HX OTHER SURGICAL  2000    remove benign tumore right side between rib and right lung    HX PACEMAKER      HX PARTIAL THYROIDECTOMY      HX UROLOGICAL  2003    bladder surgery x2          ALLERGIES     Allergies   Allergen Reactions    Beta-Blockers (Beta-Adrenergic Blocking Agts) Unknown (comments)     Made her feel tired, breakout on her back    Other Medication Unknown (comments)     Bromides    Phenobarbital Unknown (comments)    Sulfa (Sulfonamide Antibiotics) Swelling     rash          FAMILY HISTORY     Family History   Problem Relation Age of Onset    Stroke Mother     negative for cardiac disease       SOCIAL HISTORY     Social History     Social History    Marital status:      Spouse name: N/A    Number of children: N/A    Years of education: N/A     Social History Main Topics    Smoking status: Never Smoker    Smokeless tobacco: Never Used    Alcohol use Yes      Comment: 4    Drug use: No    Sexual activity: Not on file     Other Topics Concern    Not on file     Social History Narrative         MEDICATIONS     Current Outpatient Prescriptions   Medication Sig    lisinopril (PRINIVIL, ZESTRIL) 5 mg tablet Take  by mouth daily.  potassium chloride (K-DUR, KLOR-CON) 20 mEq tablet Take 1 Tab by mouth daily.  bumetanide (BUMEX) 1 mg tablet One pill by mouth by 9:00 a.m. and one pill no later than 2:00 p.m.  carvedilol (COREG) 6.25 mg tablet Take 1 Tab by mouth two (2) times daily (with meals).  pravastatin (PRAVACHOL) 40 mg tablet Take 1 Tab by mouth nightly.  LACTOBACILLUS ACIDOPHILUS (PROBIOTIC PO) Take  by mouth daily.     omeprazole (PRILOSEC) 20 mg capsule Take 20 mg by mouth daily.  aspirin 81 mg chewable tablet Take 1 Tab by mouth daily.  co-enzyme Q-10 (CO Q-10) 50 mg capsule Take 100 mg by mouth daily (after lunch).  imipramine (TOFRANIL) 25 mg tablet Take 25 mg by mouth nightly.  levothyroxine (SYNTHROID) 88 mcg tablet Take 88 mcg by mouth Daily (before breakfast).  sertraline (ZOLOFT) 100 mg tablet Take 100 mg by mouth nightly.  metFORMIN (GLUCOPHAGE) 1,000 mg tablet Take 1,000 mg by mouth two (2) times daily (with meals).  ergocalciferol (VITAMIN D2) 50,000 unit capsule Take one tablet on Monday, Wednesday and Friday.  melatonin 10 mg cap Take 10 mg by mouth nightly.  multivitamin (ONE A DAY) tablet Take 1 Tab by mouth daily (after lunch).  DOCOSAHEXANOIC ACID/EPA (FISH OIL PO) Take 1 Tab by mouth daily (after lunch). No current facility-administered medications for this visit. I have reviewed the nurses notes, vitals, problem list, allergy list, medical history, family, social history and medications. REVIEW OF SYMPTOMS      General: +fatigue, Pt denies excessive weight gain or loss. Pt is able to conduct ADL's  HEENT: Denies blurred vision, headaches, hearing loss, epistaxis and difficulty swallowing. Respiratory: +sob with exertion, Denies cough, congestion, wheezing or stridor. Cardiovascular: Denies precordial pain, palpitations, edema or PND  Gastrointestinal: Denies poor appetite, indigestion, abdominal pain or blood in stool  Genitourinary: Denies hematuria, dysuria, increased urinary frequency  Musculoskeletal: Denies joint pain or swelling from muscles or joints  Neurologic: Denies tremor, paresthesias, headache, or sensory motor disturbance  Psychiatric: Denies confusion, insomnia, depression  Integumentray: Denies rash, itching or ulcers. Hematologic: Denies easy bruising, bleeding     PHYSICAL EXAMINATION      There were no vitals filed for this visit.   General: Well developed, in no acute distress. HEENT: No jaundice, oral mucosa moist, no oral ulcers  Neck: Supple, no stiffness, no lymphadenopathy, supple  Heart:  +murmur, Normal S1/S2 negative S3 or S4. Regular, gallop or rub, no jugular venous distention  Respiratory: Clear bilaterally x 4, no wheezing or rales  Abdomen:   Soft, non-tender, bowel sounds are active.   Extremities:  No edema, normal cap refill, no cyanosis. Musculoskeletal: No clubbing, no deformities  Neuro: A&Ox3, speech clear, gait stable, cooperative, no focal neurologic deficits  Skin: Skin color is normal. No rashes or lesions. Non diaphoretic, moist.  Vascular: 2+ pulses symmetric in all extremities       DIAGNOSTIC DATA      EKG: Vpacing       LABORATORY DATA      Lab Results   Component Value Date/Time    WBC 6.8 04/27/2017 12:00 AM    Hemoglobin (POC) 12.6 01/15/2014 12:49 PM    HGB 11.5 04/27/2017 12:00 AM    Hematocrit (POC) 37 01/15/2014 12:49 PM    HCT 34.7 04/27/2017 12:00 AM    PLATELET 842 48/21/8106 12:00 AM     04/27/2017 12:00 AM      Lab Results   Component Value Date/Time    Sodium 143 04/27/2017 12:00 AM    Potassium 4.0 04/27/2017 12:00 AM    Chloride 97 04/27/2017 12:00 AM    CO2 25 04/27/2017 12:00 AM    Anion gap 10 02/18/2016 03:30 AM    Glucose 140 04/27/2017 12:00 AM    BUN 27 04/27/2017 12:00 AM    Creatinine 1.21 04/27/2017 12:00 AM    BUN/Creatinine ratio 22 04/27/2017 12:00 AM    GFR est AA 46 04/27/2017 12:00 AM    GFR est non-AA 40 04/27/2017 12:00 AM    Calcium 9.7 04/27/2017 12:00 AM    Bilirubin, total 0.6 04/27/2017 12:00 AM    AST (SGOT) 27 04/27/2017 12:00 AM    Alk. phosphatase 59 04/27/2017 12:00 AM    Protein, total 6.6 04/27/2017 12:00 AM    Albumin 4.4 04/27/2017 12:00 AM    Globulin 2.9 02/13/2016 05:40 AM    A-G Ratio 2.0 04/27/2017 12:00 AM    ALT (SGPT) 19 04/27/2017 12:00 AM           ASSESSMENT      1.  Cardiomyopathy                         A. NICM                        B. NYHA class III C. LBBB ( ms)  2. Hypertension  3. Fatigue  4. Shortness of breath  5. ICD   A. CRTD   B. [employer]     Continue current therapy and per device clinic. Follow up in one year. FOLLOW-UP     1 year    Thank you,  Pro Sims MD and Dr. Zuri Johnson for involving me in the care of this extraordinarily pleasant female. Please do not hesitate to contact me for further questions/concerns.      CAMILA Kaplans[address]bet [facility] 92.  [facility]5 [facility], [address] [facility]TA[facility] Ilene27 Watts StreetKaitlynn mcdanielSaint Francis Medical Center  [phone] / [phone] Fax   [phone] / [phone] Fax

## 2018-02-08 ENCOUNTER — CLINICAL SUPPORT (OUTPATIENT)
Dept: CARDIOLOGY CLINIC | Age: 83
End: 2018-02-08

## 2018-02-08 ENCOUNTER — OFFICE VISIT (OUTPATIENT)
Dept: CARDIOLOGY CLINIC | Age: 83
End: 2018-02-08

## 2018-02-08 VITALS
HEIGHT: 64 IN | OXYGEN SATURATION: 97 % | HEART RATE: 80 BPM | RESPIRATION RATE: 16 BRPM | BODY MASS INDEX: 29.37 KG/M2 | SYSTOLIC BLOOD PRESSURE: 102 MMHG | WEIGHT: 172 LBS | DIASTOLIC BLOOD PRESSURE: 56 MMHG

## 2018-02-08 DIAGNOSIS — I10 ESSENTIAL HYPERTENSION: ICD-10-CM

## 2018-02-08 DIAGNOSIS — I50.22 CHRONIC SYSTOLIC HEART FAILURE (HCC): ICD-10-CM

## 2018-02-08 DIAGNOSIS — R06.02 SOB (SHORTNESS OF BREATH): Primary | ICD-10-CM

## 2018-02-08 DIAGNOSIS — Z95.810 BIVENTRICULAR AUTOMATIC IMPLANTABLE CARDIOVERTER DEFIBRILLATOR IN SITU: ICD-10-CM

## 2018-02-08 DIAGNOSIS — I50.22 CHRONIC SYSTOLIC HEART FAILURE (HCC): Primary | ICD-10-CM

## 2018-02-08 RX ORDER — BUMETANIDE 1 MG/1
1 TABLET ORAL DAILY
Qty: 90 TAB | Refills: 3 | Status: SHIPPED | OUTPATIENT
Start: 2018-02-08 | End: 2019-05-23 | Stop reason: SDUPTHER

## 2018-02-08 NOTE — PROGRESS NOTES
Winsome Nieves MD. [facility]              Patient: Jamila Rolle  : 1928      Today's Date: 2018          HISTORY OF PRESENT ILLNESS:     History of Present Illness:  Ms. Hortensia Allen is here for follow-up. Is tried. Gets HARTLEY. She is a little depressed too. No orthopnea. PAST MEDICAL HISTORY:     Past Medical History:   Diagnosis Date    Arthritis     Breast CA ([address]     Breast cancer [facility]     Diabetes ([facility]     Diarrhea     chronic     Dyslipidemia     GERD (gastroesophageal reflux disease)     Hearing loss     Hypertension     LBBB (left bundle branch block)     chronic LBBB    Melanoma ([facility]     Non-ischemic cardiomyopathy ([facility]     Cath 2/16/15 - Mild (non-obstructive) CAD,LVEF 30%, 2+ MR    Seizures (HCC)     X1 post partum after first child was born- no more seizures    Skin cancer     melanoma on legs    Sleep apnea     wears O2 at night- cannot tolerate CPAP    Snoring     SOB (shortness of breath)     Systolic CHF ([facility]     Admission 16    Thyroid disease          Past Surgical History:   Procedure Laterality Date    HX BREAST LUMPECTOMY  2003    right    HX CATARACT REMOVAL  1994    bilateral    HX CHOLECYSTECTOMY      HX DILATION AND CURETTAGE  1960    HX HEART CATHETERIZATION      x2- no blockages    HX HEENT      surgery on upper jaw    HX HYSTERECTOMY  1967    HX HYSTEROSCOPY      HX OOPHORECTOMY  1967    left    HX ORTHOPAEDIC  2010    right knee replacement    HX ORTHOPAEDIC      repair torn meniscus left knee    HX ORTHOPAEDIC      repair knuckle on left pinky finger    HX OTHER SURGICAL  2000    remove benign tumore right side between rib and right lung    HX PACEMAKER      HX PARTIAL THYROIDECTOMY      HX UROLOGICAL  2003    bladder surgery x2           MEDICATIONS:     Current Outpatient Prescriptions   Medication Sig Dispense Refill    losartan (COZAAR) 25 mg tablet Take 25 mg by mouth daily.       potassium chloride (K-DUR, KLOR-CON) 20 mEq tablet Take 1 Tab by mouth daily. 30 Tab 3    bumetanide (BUMEX) 1 mg tablet One pill by mouth by 9:00 a.m. and one pill no later than 2:00 p.m. 180 Tab 3    carvedilol (COREG) 6.25 mg tablet Take 1 Tab by mouth two (2) times daily (with meals). 180 Tab 3    pravastatin (PRAVACHOL) 40 mg tablet Take 1 Tab by mouth nightly. 90 Tab 3    LACTOBACILLUS ACIDOPHILUS (PROBIOTIC PO) Take  by mouth daily.  omeprazole (PRILOSEC) 20 mg capsule Take 20 mg by mouth daily.  aspirin 81 mg chewable tablet Take 1 Tab by mouth daily. 30 Tab 0    co-enzyme Q-10 (CO Q-10) 50 mg capsule Take 100 mg by mouth daily (after lunch).  levothyroxine (SYNTHROID) 88 mcg tablet Take 88 mcg by mouth Daily (before breakfast).  sertraline (ZOLOFT) 100 mg tablet Take 100 mg by mouth nightly.  metFORMIN (GLUCOPHAGE) 1,000 mg tablet Take 1,000 mg by mouth two (2) times daily (with meals).  ergocalciferol (VITAMIN D2) 50,000 unit capsule Take one tablet on Monday, Wednesday and Friday.  melatonin 10 mg cap Take 5 mg by mouth nightly.  multivitamin (ONE A DAY) tablet Take 1 Tab by mouth daily (after lunch).  DOCOSAHEXANOIC ACID/EPA (FISH OIL PO) Take 1 Tab by mouth daily (after lunch).          Allergies   Allergen Reactions    Beta-Blockers (Beta-Adrenergic Blocking Agts) Unknown (comments)     Made her feel tired, breakout on her back    Other Medication Unknown (comments)     Bromides    Phenobarbital Unknown (comments)    Sulfa (Sulfonamide Antibiotics) Swelling     rash             SOCIAL HISTORY:     Social History   Substance Use Topics    Smoking status: Never Smoker    Smokeless tobacco: Never Used    Alcohol use 2.4 oz/week     3 Glasses of wine, 1 Shots of liquor per week           FAMILY HISTORY:     Family History   Problem Relation Age of Onset    Stroke Mother              REVIEW OF SYSTEMS:             Review of Symptoms:  Constitutional: Negative for fever, chills  HEENT: Negative for nosebleeds, tinnitus, and vision changes.    Respiratory: + HARTLEY, occ cough   Cardiovascular: Negative for  Syncope    Gastrointestinal: Negative for   melena.    Genitourinary: + recent UTI    Musculoskeletal: Negative for myalgias.    Skin: Negative for rash  Heme: No problems bleeding. Neurological: Negative for speech change and focal weakness.                PHYSICAL EXAM:             Physical Exam:  Visit Vitals    /56 (BP 1 Location: Left arm, BP Patient Position: Sitting)    Pulse 80    Resp 16    Ht 5' 4\" (1.626 m)    Wt 172 lb (78 kg)    SpO2 97%    BMI 29.52 kg/m2           Patient appears generally well, mood and affect are appropriate and pleasant. HEENT:  Hearing intact, non-icteric, normocephalic, atraumatic.    Neck Exam: Supple, No sig JVD sitting up. No carotid bruits.    Lung Exam: Clear to auscultation, even breath sounds.    Cardiac Exam: Regular rate and rhythm with no murmur  Abdomen: Soft, non-tender    Extremities: Moves all ext well. No lower extremity edema. Psych: Appropriate affect  Neuro - Grossly intact              LABS / OTHER STUDIES:             Labs 2/7/18 - TSH 0.4, A1c 6.6, chol 132, TG 99, HDL 56, LDL 56, Cr 1.14, K 4.7, LFT's OK, Hgb 10.1, plt 199        Grant Burly  CARDIAC DIAGNOSTICS:                 Cardiac Evaluation Includes:  EKG 2/13/16 - NSR, IVCD, PRWP      Echo 2/13/16 - TDS.   LVEF 30-35%.   There was moderate diffuse hypokinesis - Only the basal anterolateral and infrolateral walls contracts normally.  RV size and function normal.  RVSP 35      CTA chest 2/13/16 - No evidence of pulmonary embolus. Mild pulmonary interstitial edema and small bilateral pleural effusions.  Small bilateral indeterminate pulmonary nodules, measuring up to 4 mm.      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 Moderate to severe deep white matter ischemic change. 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 No new confluent infarct or hemorrhage or mass effect.      Echo 2/8/18  - LVEF 50%, grade 1 diastology, RV normal, RVSP 35        EKG 12/1/15 - NSR, Nonspecific intraventricular block,   EKG 1/3/18 - V paced                 ASSESSMENT AND PLAN:             Assessment and Plan:  1) Acute / Chronic decompensated CHF (systolic dysfunction, HFrEF)(non-ischemic CM) - discovered 2/13/16   - Echo 4/14/16 shows that LVEF still is 30%.   Echo 5/26/16 shows LVEF just 20%.     Echo 7/28/16 with LVEF 25%. - BIV ICD placed 8/15/16   - She felt a little better afterwards    - On Echo 2/2/17 LVEF is better at 40%. 2/8/18, LVEF improved to 50%  - continue Coreg.    - Due to cough, switched from lisinopril to losartan. She could not tolerate Entresto at all.    - Due to prior falls (and risk of orthostasis), have kept meds on lower doses   - volume status is fair on Bumex  - She is still tired still and I'm not sure why. She may be a little dry so will cautiously cut back Bumex to 1 mg daily. I asked her to keep salt intake low and follow weight closely. She is to see PCP tomorrow. I encouraged her to exercise. - I strongly recommend cardiac rehab, but she declines (she says she cannot make it there) - instead she will try yoga       2) HTN    - BP stable without orthostatic complaints (although has random falls)   - continue meds   - SBP at home 130 she says       3) See me in 3 months.   Patient expressed understanding of the plan - questions were answered.       On a side note, she is from [place] and worked for years as a .   Jackie Sher MD, [address] [place] [address],  [address] [place] [address]  [facility], [address]      [id number], [address] [place] [address], [address] [place] [address]  Ph: [phone]                                [phone]      ADDENDUM   4/26/2018  Plan for hand surgery under Axillary block anesthesia with Dr. Sara Crump tomorrow. She can proceed with surgery and should have intermediate risk. Can hold aspirin 5-7 days prior to surgery if that is needed and resume afterwards.

## 2018-02-08 NOTE — MR AVS SNAPSHOT
[address] [facility] 
[phone] Patient: Ina Leigh MRN: [id number] TDT:4/6/8840 Visit Information Date & Time Provider Department Dept. Phone Encounter #  
 2/8/2018  2:40 PM Rin Way MD [facility] Valerie Price [phone] [id number] Your Appointments 4/4/2018  2:30 PM  
PACEMAKER with PACEMAKER, STFRANCES  
[facility] ([facility] Appt Note: raya/icd/stf  
 [address] [address]  
[phone]  
  
   
 [address] [address]  
  
    
 5/31/2018  2:20 PM  
ESTABLISHED PATIENT with Rin Way MD  
[facility] ([facility]) Appt Note: 3 mo fu  
 [address] [facility]  
[phone]  
  
   
 [address] [facility] [address] [address]  
  
    
 1/16/2019  3:20 PM  
ESTABLISHED PATIENT with Clinton Salinas MD  
[facility] ([facility]) Appt Note: annual visit [address] [facility] [address] [facility]  
[address] [address] [address] Upcoming Health Maintenance Date Due  
 FOOT EXAM Q1 7/5/1938 EYE EXAM RETINAL OR DILATED Q1 7/5/1938 ZOSTER VACCINE AGE 60> 5/5/1988 GLAUCOMA SCREENING Q2Y 7/5/1993 Pneumococcal 65+ Low/Medium Risk (1 of 2 - PCV13) 7/5/1993 MEDICARE YEARLY EXAM 7/5/1993 MICROALBUMIN Q1 1/19/2017 Influenza Age 5 to Adult 8/1/2017 HEMOGLOBIN A1C Q6M 8/2/2017 LIPID PANEL Q1 2/2/2018 DTaP/Tdap/Td series (2 - Td) 2/14/2027 Allergies as of 2/8/2018  Review Complete On: 2/8/2018 By: Rin Way MD  
  
 Severity Noted Reaction Type Reactions Beta-blockers (Beta-adrenergic Blocking Agts)  12/01/2015    Unknown (comments) Made her feel tired, breakout on her back Other Medication  12/01/2015    Unknown (comments) Bromides Phenobarbital  12/01/2015    Unknown (comments) Sulfa (Sulfonamide Antibiotics)  01/15/2014    Swelling  
 rash Current Immunizations  Never Reviewed Name Date Tdap 2/14/2017  3:17 AM  
  
 Not reviewed this visit You Were Diagnosed With   
  
 Codes Comments SOB (shortness of breath)    -  Primary ICD-10-CM: R06.02 
ICD-9-CM: 786.05 Chronic systolic heart failure (HCC)     ICD-10-CM: I50.22 ICD-9-CM: 428.22 Essential hypertension     ICD-10-CM: I10 
ICD-9-CM: 401.9 Vitals BP Pulse Resp Height(growth percentile) Weight(growth percentile) SpO2  
 102/56 (BP 1 Location: Left arm, BP Patient Position: Sitting) 80 16 5' 4\" (1.626 m) 172 lb (78 kg) 97% BMI OB Status Smoking Status 29.52 kg/m2 Hysterectomy Never Smoker Vitals History BMI and BSA Data Body Mass Index Body Surface Area  
 29.52 kg/m 2 1.88 m 2 Preferred Pharmacy Pharmacy Name Phone [address], [facility] [phone] Your Updated Medication List  
  
   
This list is accurate as of: 2/8/18  3:17 PM.  Always use your most recent med list.  
  
  
  
  
 aspirin 81 mg chewable tablet Take 1 Tab by mouth daily. bumetanide 1 mg tablet Commonly known as:  Christia Ovens Take 1 Tab by mouth daily. Take an extra dose in the afternoon as needed for weight gain. carvedilol 6.25 mg tablet Commonly known as:  Suzy Putt Take 1 Tab by mouth two (2) times daily (with meals). co-enzyme Q-10 50 mg capsule Commonly known as:  CO Q-10 Take 100 mg by mouth daily (after lunch). FISH OIL PO Take 1 Tab by mouth daily (after lunch). losartan 25 mg tablet Commonly known as:  COZAAR Take 25 mg by mouth daily. melatonin 10 mg Cap Take 5 mg by mouth nightly. metFORMIN 1,000 mg tablet Commonly known as:  GLUCOPHAGE Take 1,000 mg by mouth two (2) times daily (with meals). multivitamin tablet Commonly known as:  ONE A DAY Take 1 Tab by mouth daily (after lunch). omeprazole 20 mg capsule Commonly known as:  PRILOSEC Take 20 mg by mouth daily. potassium chloride 20 mEq tablet Commonly known as:  K-DUR, KLOR-CON Take 1 Tab by mouth daily. pravastatin 40 mg tablet Commonly known as:  PRAVACHOL Take 1 Tab by mouth nightly. PROBIOTIC PO Take  by mouth daily. sertraline 100 mg tablet Commonly known as:  ZOLOFT Take 100 mg by mouth nightly. SYNTHROID 88 mcg tablet Generic drug:  levothyroxine Take 88 mcg by mouth Daily (before breakfast). VITAMIN D2 50,000 unit capsule Generic drug:  ergocalciferol Take one tablet on Monday, Wednesday and Friday. Prescriptions Sent to Pharmacy Refills  
 bumetanide (BUMEX) 1 mg tablet 3 Sig: Take 1 Tab by mouth daily. Take an extra dose in the afternoon as needed for weight gain. Class: Normal  
 Pharmacy: [address], [address] Ph #: [phone] Route: Oral  
  
Introducing [facility]! Dear Carey Allen: Thank you for requesting a [employer] account. Our records indicate that you have previously registered for a [employer] account but its currently inactive. Please call our [employer] support line at [phone]. Additional Information If you have questions, please visit the Frequently Asked Questions section of the [employer] website at [email or web address] Remember, [employer] is NOT to be used for urgent needs. For medical emergencies, dial 911. Now available from your iPhone and Android! Please provide this summary of care documentation to your next provider. Your primary care clinician is listed as Maynor Barba.  If you have any questions after today's visit, please call [phone].

## 2018-02-08 NOTE — PROGRESS NOTES
Chief Complaint   Patient presents with    Hypertension    CHF     1. Have you been to the ER, urgent care clinic since your last visit? Hospitalized since your last visit? No    2. Have you seen or consulted any other health care providers outside of the [address] [place] [address] since your last visit? Include any pap smears or colon screening.  No

## 2018-04-12 ENCOUNTER — CLINICAL SUPPORT (OUTPATIENT)
Dept: CARDIOLOGY CLINIC | Age: 83
End: 2018-04-12

## 2018-04-12 DIAGNOSIS — Z95.810 BIVENTRICULAR AUTOMATIC IMPLANTABLE CARDIOVERTER DEFIBRILLATOR IN SITU: Primary | ICD-10-CM

## 2018-04-18 ENCOUNTER — HOSPITAL ENCOUNTER (EMERGENCY)
Age: 83
Discharge: HOME | End: 2018-04-18
Attending: EMERGENCY MEDICINE
Payer: MEDICARE

## 2018-04-18 ENCOUNTER — APPOINTMENT (OUTPATIENT)
Dept: GENERAL RADIOLOGY | Age: 83
End: 2018-04-18
Attending: EMERGENCY MEDICINE
Payer: MEDICARE

## 2018-04-18 VITALS
HEIGHT: 64 IN | SYSTOLIC BLOOD PRESSURE: 126 MMHG | TEMPERATURE: 98 F | WEIGHT: 172 LBS | DIASTOLIC BLOOD PRESSURE: 98 MMHG | BODY MASS INDEX: 29.37 KG/M2 | HEART RATE: 77 BPM | OXYGEN SATURATION: 94 % | RESPIRATION RATE: 16 BRPM

## 2018-04-18 DIAGNOSIS — S52.502A CLOSED FRACTURE OF DISTAL ENDS OF LEFT RADIUS AND ULNA, INITIAL ENCOUNTER: Primary | ICD-10-CM

## 2018-04-18 DIAGNOSIS — S52.602A CLOSED FRACTURE OF DISTAL ENDS OF LEFT RADIUS AND ULNA, INITIAL ENCOUNTER: Primary | ICD-10-CM

## 2018-04-18 PROCEDURE — 75810000302 HC ER LEVEL 2 CLOSED TREATMNT FRACTURE/DISLOCATION

## 2018-04-18 PROCEDURE — A4565 SLINGS: HCPCS

## 2018-04-18 PROCEDURE — 73110 X-RAY EXAM OF WRIST: CPT

## 2018-04-18 PROCEDURE — 77030011881 HC TAPE CST FBRGLS BSNM -A

## 2018-04-18 PROCEDURE — 77030028224 HC PDNG CST BSNM -A

## 2018-04-18 PROCEDURE — 75810000303 HC CLSD TRMT  FRACTURE/DISLOCATION W/  ANES

## 2018-04-18 PROCEDURE — 74011250637 HC RX REV CODE- 250/637: Performed by: EMERGENCY MEDICINE

## 2018-04-18 PROCEDURE — 74011000250 HC RX REV CODE- 250: Performed by: EMERGENCY MEDICINE

## 2018-04-18 PROCEDURE — 99284 EMERGENCY DEPT VISIT MOD MDM: CPT

## 2018-04-18 RX ORDER — LIDOCAINE HYDROCHLORIDE 10 MG/ML
10 INJECTION, SOLUTION EPIDURAL; INFILTRATION; INTRACAUDAL; PERINEURAL ONCE
Status: COMPLETED | OUTPATIENT
Start: 2018-04-18 | End: 2018-04-18

## 2018-04-18 RX ORDER — HYDROCODONE BITARTRATE AND ACETAMINOPHEN 5; 325 MG/1; MG/1
1 TABLET ORAL
Qty: 4 TAB | Refills: 0 | Status: SHIPPED | OUTPATIENT
Start: 2018-04-18 | End: 2018-11-15 | Stop reason: ALTCHOICE

## 2018-04-18 RX ORDER — HYDROCODONE BITARTRATE AND ACETAMINOPHEN 5; 325 MG/1; MG/1
1 TABLET ORAL
Status: COMPLETED | OUTPATIENT
Start: 2018-04-18 | End: 2018-04-18

## 2018-04-18 RX ADMIN — LIDOCAINE HYDROCHLORIDE 10 ML: 10 INJECTION, SOLUTION EPIDURAL; INFILTRATION; INTRACAUDAL; PERINEURAL at 17:14

## 2018-04-18 RX ADMIN — HYDROCODONE BITARTRATE AND ACETAMINOPHEN 1 TABLET: 5; 325 TABLET ORAL at 18:54

## 2018-04-18 NOTE — ED TRIAGE NOTES
Pt arrives with c/o of ground level fall, pt states was walking in parking lot and loss footing and tripped and fell, pt denies any loss of consciousness. C/o of left wrist pain with obvious deformity.   Ring removed from left wrist.

## 2018-04-18 NOTE — DISCHARGE INSTRUCTIONS
Broken Wrist: Care Instructions  Your Care Instructions    Your wrist can break, or fracture, during sports, a fall, or other accidents. The break may happen when your wrist is hit or is used to protect you in a fall. Fractures can range from a small, hairline crack, to a bone or bones broken into two or more pieces. Your treatment depends on how bad the break is. Your doctor may have put your wrist in a cast or splint. This will help keep your wrist stable until your follow-up appointment. It may take weeks or months for your wrist to heal. You can help it heal with care at home. You heal best when you take good care of yourself. Eat a variety of healthy foods, and don't smoke. Follow-up care is a key part of your treatment and safety. Be sure to make and go to all appointments, and call your doctor if you are having problems. It's also a good idea to know your test results and keep a list of the medicines you take. How can you care for yourself at home? · Put ice or a cold pack on your wrist for 10 to 20 minutes at a time. Try to do this every 1 to 2 hours for the next 3 days (when you are awake). Put a thin cloth between the ice and your cast or splint. Keep your cast or splint dry. · Follow the splint or cast care instructions your doctor gives you. If you have a splint, do not take it off unless your doctor tells you to. Be careful not to put the splint on too tight. · Be safe with medicines. Take pain medicines exactly as directed. ¨ If the doctor gave you a prescription medicine for pain, take it as prescribed. ¨ If you are not taking a prescription pain medicine, ask your doctor if you can take an over-the-counter medicine. · Prop up your wrist on pillows when you sit or lie down in the first few days after the injury. Keep your wrist higher than the level of your heart. This will help reduce swelling.   · Move your fingers often to reduce swelling and stiffness, but do not use that hand to grab or carry anything. · Follow instructions for exercises to keep your arm strong. When should you call for help? Call your doctor now or seek immediate medical care if:  ? · You have new or worse pain. ? · Your hand or fingers are cool or pale or change color. ? · Your cast or splint feels too tight. ? · You have tingling, weakness, or numbness in your hand or fingers. ? Watch closely for changes in your health, and be sure to contact your doctor if:  ? · You do not get better as expected. ? · You have problems with your cast or splint. Where can you learn more? Go to http://smita-armand.info/. Enter [id number] in the search box to learn more about \"Broken Wrist: Care Instructions. \"  Current as of: March 21, 2017  Content Version: 11.4  © 3878-5293 [employer]. Care instructions adapted under license by [employer] (which disclaims liability or warranty for this information). If you have questions about a medical condition or this instruction, always ask your healthcare professional. [address] any warranty or liability for your use of this information.

## 2018-04-18 NOTE — ED PROVIDER NOTES
HPI Comments: 80 y.o. female with extensive past medical history, please see list, significant for HTN, DM, arthritis, seizures, dyslipidemia, and non-ischemic cardiomyopathy, who presents via EMS to the ED with cc of left wrist pain. Pt reports left wrist pain and swelling s/p fall earlier today. She states she lost her balance in a parking lot and fell onto her left hand and wrist without head trauma or LOC. She states the pain is exacerbated by movement, flexion, and extension. She reports associated diffuse LUE pain. She notes she is right-handed. Per pt, she owns a walker and cane but has not been instructed to use either on a regular basis. She reports PShx of right knee replacement. Pt specifically denies any numbness, tingling, neck pain, CP, SOB, leg swelling, urinary or BM changes, fever, chills, recent illness, hip pain, or knee pain. There are no other acute medical concerns at this time. Social Hx: denies Tobacco use, weekly EtOH use, denies Illicit Drug use  PCP: Edmund Doss MD  Ortho: Beena Yeager MD (retired)    Note written by Saul Mcgill, as dictated by Ila Tay. Myrtle Pina MD 3:57 PM      The history is provided by the patient. No  was used.         Past Medical History:   Diagnosis Date    Arthritis     [facility]     [facility] cancer [facility]     Diabetes ([address]     Diarrhea     chronic     Dyslipidemia     GERD (gastroesophageal reflux disease)     Hearing loss     Hypertension     LBBB (left bundle branch block)     chronic LBBB    Melanoma ([address]     Non-ischemic cardiomyopathy ([facility]     Cath 2/16/15 - Mild (non-obstructive) CAD,LVEF 30%, 2+ MR    Seizures (HCC)     X1 post partum after first child was born- no more seizures    Skin cancer     melanoma on legs    Sleep apnea     wears O2 at night- cannot tolerate CPAP    Snoring     SOB (shortness of breath)     Systolic CHF [facility])     Admission 2/13/16    Thyroid disease        Past Surgical History:   Procedure Laterality Date    HX BREAST LUMPECTOMY  2003    right    HX CATARACT REMOVAL  1994    bilateral    HX CHOLECYSTECTOMY      HX DILATION AND CURETTAGE  1960    HX HEART CATHETERIZATION      x2- no blockages    HX HEENT  2004    surgery on upper jaw    HX HYSTERECTOMY  1967    HX HYSTEROSCOPY      HX OOPHORECTOMY  1967    left    HX ORTHOPAEDIC  2010    right knee replacement    HX ORTHOPAEDIC      repair torn meniscus left knee    HX ORTHOPAEDIC  1996    repair knuckle on left pinky finger    HX OTHER SURGICAL  2000    remove benign tumore right side between rib and right lung    HX PACEMAKER      HX PARTIAL THYROIDECTOMY      HX UROLOGICAL  2003    bladder surgery x2         Family History:   Problem Relation Age of Onset    Stroke Mother        Social History     Social History    Marital status:      Spouse name: N/A    Number of children: N/A    Years of education: N/A     Occupational History    Not on file. Social History Main Topics    Smoking status: Never Smoker    Smokeless tobacco: Never Used    Alcohol use 2.4 oz/week     3 Glasses of wine, 1 Shots of liquor per week    Drug use: No    Sexual activity: No     Other Topics Concern    Not on file     Social History Narrative         ALLERGIES: Beta-blockers (beta-adrenergic blocking agts); Other medication; Phenobarbital; and Sulfa (sulfonamide antibiotics)    Review of Systems   Constitutional: Negative for chills and fever. HENT: Negative for ear pain and sore throat. Eyes: Negative for pain. Respiratory: Negative for chest tightness and shortness of breath. Cardiovascular: Negative for chest pain and leg swelling. Gastrointestinal: Negative for abdominal pain, blood in stool, constipation, diarrhea, nausea and vomiting. Genitourinary: Negative for dysuria, flank pain and hematuria. Musculoskeletal: Positive for arthralgias. Negative for back pain.    Skin: Negative for rash.   Neurological: Negative for syncope, numbness and headaches. All other systems reviewed and are negative. Vitals:    04/18/18 1556 04/18/18 1600   BP:  126/68   Pulse: 77    Resp: 16    Temp: 98 °F (36.7 °C)    SpO2: 96% 95%   Weight: 78 kg (172 lb)    Height: 5' 4\" (1.626 m)             Physical Exam   Constitutional: She appears well-developed and well-nourished. Elderly   HENT:   Head: Normocephalic and atraumatic. Mouth/Throat: Oropharynx is clear and moist.   Eyes: Conjunctivae and EOM are normal. Pupils are equal, round, and reactive to light. No scleral icterus. Neck: Neck supple. No tracheal deviation present. Cardiovascular: Normal rate, regular rhythm, normal heart sounds and intact distal pulses. Radial pulses 2+   Pulmonary/Chest: Effort normal and breath sounds normal. No respiratory distress. Abdominal: Soft. She exhibits no distension. There is no tenderness. There is no rebound and no guarding. Genitourinary:   Genitourinary Comments: deferred   Musculoskeletal:   No C-spine tenderness  L wrist deformity, tenderness   Neurological: She is alert. Motor and sensation intact in all extremities   Skin: Skin is warm and dry. Psychiatric: She has a normal mood and affect. Nursing note and vitals reviewed. Note written by Saul Roman, as dictated by Jared Mayes. Mitch Campos MD 3:57 PM      MDM  Number of Diagnoses or Management Options        ED Course   Pt initially declining pain medicine despite obvious fracture. Reduction of Joint  Date/Time: 4/18/2018 7:01 PM  Performed by: Shirin Lay   Authorized by: Shirin Lay     Consent:     Consent obtained:  Verbal    Consent given by:  Patient    Risks discussed:  Nerve damage, pain and vascular damage    Alternatives discussed:  No treatment  Injury:     Injury location:  Forearm    Forearm injury location:  L forearm    Forearm fracture type: distal radius and ulnar styloid    Pre-procedure assessment:     Neurological function: normal      Distal perfusion: normal      Range of motion: reduced    Anesthesia (see MAR for exact dosages): Anesthesia method:  Nerve block    Block location:  Hematoma block    Block needle gauge:  25 G    Block anesthetic:  Lidocaine 1% w/o epi    Block injection procedure:  Anatomic landmarks identified, introduced needle, incremental injection and anatomic landmarks palpated    Block outcome:  Anesthesia achieved  Procedure details:     Manipulation performed: yes      Reduction successful: yes      X-ray confirmed reduction: yes      Immobilization:  Sling and splint    Splint type:  Sugar tong    Supplies used:  Ortho-Glass and cotton padding  Post-procedure assessment:     Neurological function: normal      Distal perfusion: normal      Patient tolerance of procedure: Tolerated well, no immediate complications        A/P: 81 yo female with mechanical GLF p/w left wrist fracture. Improved alignment after manipulation. - splint, sling  - CM consulted in ED to assist with home care  - f/u with Ortho  - Return to the emergency department for new/ worsening symptoms or other concerns    Hank Barajas.  Opal Garcia MD

## 2018-04-18 NOTE — PROGRESS NOTES
CM spoke to pt regarding home care orders, pt agree to [facility] visit from [facility] for Diabetes. Message sent to Home care mark son to see if they can accept pt. Pt stated her Rose Canela [phone] will be able to pick her up and also her friend Guerline Taylor works at [facility] [employer] center can also provide transportation. The Pt stated there is a wellness nurse at [facility] that she will be able to use if needed. Pt states she is comfortable going home and given list of home care aides.

## 2018-04-18 NOTE — PROGRESS NOTES
4/18/2018  6:24 PM  CM met with patient alongside Cathy James RN, for initial assessment and discharge planning. Patient presented to ED after falling off a curb and fracturing her wrist.  Patient reports she ambulates independently without issue and that her knee gave out on her today. Patient states she lives in the independent living at [facility] and expressed a strong desire to return there. CM explored the possibility of assisted living, but patient declined interest at this time. CM states she lives alone but has daily telephone contact and frequent in-person contact with close family friends, Nuha Ceron and Inder Payne; however, her 3 adult children live out of state. She reports a history of HH after her right knee surgery years ago but denies any inpatient rehab. Patient confirms home DME of glucometer and blood pressure cuff. Patient states she has a maid who comes 2x/mo but she can increase this frequency and has finances to do so. She also reported that she can have meals delivered through [facility]. CM explored the possibility of utilizing a private duty aide for additional assistance, as patient states she is incontinent and wears depends. Patient was initially reluctant, stating she had utilized an aide in the past and did not find it helpful, but was eventually willing to accept a list of community private duty aides, provided to her by SAUL. CM educated on the [facility] program for her diabetes, [facility], and patient expressed a preference for [employer]. Patient signed the choice letter for this service to be scanned to chart. Patient confirmed she would receive transportation home at discharge from her friend Inder Payne. Segundo Sen RN, spoke to Inder Payne ([phone]) to confirm. Care Management Interventions  PCP Verified by CM: Yes  Last Visit to PCP: 04/11/18  Mode of Transport at Discharge: Other (see comment)  Current Support Network:  Other  Confirm Follow Up Transport: Friends

## 2018-04-19 NOTE — PROGRESS NOTES
SAUL spoke to [facility] however the pt was not accepted for [facility] referral. I called Pt at home to let her know. She stated did not call for any caregivers \" I don;t need any help I am just in pain\" Saul contacted Independent Living nurse at [facility] [phone] who stated the pt has to initiate the phone call for caregivers or going into the Assisted Living area of [address] [place] [address].

## 2018-04-21 ENCOUNTER — HOSPITAL ENCOUNTER (EMERGENCY)
Age: 83
Discharge: HOME | End: 2018-04-21
Attending: EMERGENCY MEDICINE
Payer: MEDICARE

## 2018-04-21 VITALS
RESPIRATION RATE: 14 BRPM | OXYGEN SATURATION: 97 % | TEMPERATURE: 97.9 F | HEIGHT: 64 IN | HEART RATE: 82 BPM | WEIGHT: 172 LBS | DIASTOLIC BLOOD PRESSURE: 59 MMHG | BODY MASS INDEX: 29.37 KG/M2 | SYSTOLIC BLOOD PRESSURE: 122 MMHG

## 2018-04-21 DIAGNOSIS — Z47.89 CAST DISCOMFORT: Primary | ICD-10-CM

## 2018-04-21 DIAGNOSIS — M79.89 LEFT ARM SWELLING: ICD-10-CM

## 2018-04-21 PROCEDURE — 99282 EMERGENCY DEPT VISIT SF MDM: CPT

## 2018-04-21 PROCEDURE — 77030008326 HC SPLNT FNGR PLSTL DERY -A

## 2018-04-21 PROCEDURE — 77030028224 HC PDNG CST BSNM -A

## 2018-04-22 NOTE — ED PROVIDER NOTES
HPI Comments: 80 y.o. female with past medical history significant for HTN, CHF, DM, Thyroid disease, GERD, Melanoma, Breast CA who presents from home with chief complaint of left wrist pain. Pt reports sustaining a fracture to her left wrist 3 days ago. She was evaluated in ED then by [address] [place] [address] where a cast was placed. Pt presents today c/o increased pain and swelling to her left wrist and forearm along site of cast. She states that it feels \"tight\". She denies any other sites of pain or injury. There are no other acute medical concerns at this time. Chart reivew: Pt evaluated in ED 4/18/18 s/p fall. Distal left radius and ulnar styloid fractures noted. Joint reduced. Pt discharged on [place] and to f/u with ortho. PCP: Edmund Doss MD    Note written by Saul Corrales, as dictated by Ryan Brody MD 8:14 PM    The history is provided by the patient and medical records. No  was used.         Past Medical History:   Diagnosis Date    Arthritis     Breast CA ([address]     Breast cancer ([address]     Diabetes ([address]     Diarrhea     chronic     Dyslipidemia     GERD (gastroesophageal reflux disease)     Hearing loss     Hypertension     LBBB (left bundle branch block)     chronic LBBB    Melanoma ([address]     Non-ischemic cardiomyopathy ([address]     Cath 2/16/15 - Mild (non-obstructive) CAD,LVEF 30%, 2+ MR    Seizures (HCC)     X1 post partum after first child was born- no more seizures    Skin cancer     melanoma on legs    Sleep apnea     wears O2 at night- cannot tolerate CPAP    Snoring     SOB (shortness of breath)     Systolic CHF ([address]     Admission 2/13/16    Thyroid disease        Past Surgical History:   Procedure Laterality Date    HX BREAST LUMPECTOMY  2003    right    HX CATARACT REMOVAL  1994    bilateral    HX CHOLECYSTECTOMY      HX DILATION AND CURETTAGE  1960    HX HEART CATHETERIZATION      x2- no blockages    HX HEENT  2004    surgery on upper jaw    HX HYSTERECTOMY  1967    HX HYSTEROSCOPY      HX OOPHORECTOMY  1967    left    HX ORTHOPAEDIC  2010    right knee replacement    HX ORTHOPAEDIC      repair torn meniscus left knee    HX ORTHOPAEDIC  1996    repair knuckle on left pinky finger    HX OTHER SURGICAL  2000    remove benign tumore right side between rib and right lung    HX PACEMAKER      HX PARTIAL THYROIDECTOMY      HX UROLOGICAL  2003    bladder surgery x2         Family History:   Problem Relation Age of Onset    Stroke Mother        Social History     Social History    Marital status:      Spouse name: N/A    Number of children: N/A    Years of education: N/A     Occupational History    Not on file. Social History Main Topics    Smoking status: Never Smoker    Smokeless tobacco: Never Used    Alcohol use 2.4 oz/week     3 Glasses of wine, 1 Shots of liquor per week    Drug use: No    Sexual activity: No     Other Topics Concern    Not on file     Social History Narrative         ALLERGIES: Beta-blockers (beta-adrenergic blocking agts); Other medication; Phenobarbital; and Sulfa (sulfonamide antibiotics)    Review of Systems   Respiratory: Negative for shortness of breath. Cardiovascular: Negative for chest pain. Gastrointestinal: Negative for nausea and vomiting. Musculoskeletal: Positive for arthralgias, joint swelling and myalgias. Neurological: Negative for numbness. All other systems reviewed and are negative. Vitals:    04/21/18 1951   BP: 122/59   Pulse: 82   Resp: 14   Temp: 97.9 °F (36.6 °C)   SpO2: 97%   Weight: 78 kg (172 lb)   Height: 5' 4\" (1.626 m)            Physical Exam   Constitutional: She appears well-developed and well-nourished. No distress. HENT:   Head: Normocephalic and atraumatic.    Mouth/Throat: Oropharynx is clear and moist.   Eyes: Conjunctivae and EOM are normal.   Neck: Normal range of motion and phonation normal.   Cardiovascular: Normal rate and intact distal pulses. Pulmonary/Chest: Effort normal. No respiratory distress. Abdominal: She exhibits no distension. Musculoskeletal: Normal range of motion. She exhibits no tenderness. Left forearm: She exhibits swelling and edema. Left forearm in short arm cast with edema both proximal and distal to cast.  Sensation intact distal to cast   Neurological: She is alert. She is not disoriented. She exhibits normal muscle tone. Skin: Skin is warm and dry. Nursing note and vitals reviewed. [facility]      ED Course       Splint, Other  Date/Time: 4/21/2018 8:20 PM  Performed by: Coco Lemons  Authorized by: Coco Lemons     Consent:     Consent obtained:  Verbal    Consent given by:  Patient    Risks discussed:  Pain    Alternatives discussed:  No treatment  Pre-procedure details:     Sensation:  Normal    Skin color:  Dusky  Procedure details:     Laterality:  Left    Location:  Arm    Arm:  L lower arm    Cast type:  Short arm (inplace prior to procedure)    Supplies:  Elastic bandage (pre procedure cast fiberglass cut with cast saw along legnth of radius and ulna sides, and  prior to reinforement with ace wrap)  Post-procedure details:     Pain:  Improved    Sensation:  Normal    Skin color:  Pink    Patient tolerance of procedure:   Tolerated well, no immediate complications

## 2018-04-24 ENCOUNTER — TELEPHONE (OUTPATIENT)
Dept: CARDIOLOGY CLINIC | Age: 83
End: 2018-04-24

## 2018-04-24 NOTE — TELEPHONE ENCOUNTER
[facility] [facility] with [facility] wanted you to know she faxed over a cardiac clearance request for this patient, they have a surgery this Friday   PHONE: [phone]

## 2018-04-26 NOTE — TELEPHONE ENCOUNTER
[phone]. Miguel A Holcomb called back with status update on cardiac clearance on behalf of pt surgery tomorrow morning.  Fax# [phone]  Thanks

## 2018-04-26 NOTE — TELEPHONE ENCOUNTER
Khoa with [facility] calling back regarding patient's cardiac clearance. She stated that patient's surgery is tomorrow. Thanks!   Phone [phone]  Fax# [phone] [place]

## 2018-07-15 DIAGNOSIS — I10 ESSENTIAL HYPERTENSION: ICD-10-CM

## 2018-07-15 DIAGNOSIS — I50.22 CHRONIC SYSTOLIC HEART FAILURE (HCC): ICD-10-CM

## 2018-07-15 DIAGNOSIS — R06.02 SOB (SHORTNESS OF BREATH): ICD-10-CM

## 2018-07-18 ENCOUNTER — CLINICAL SUPPORT (OUTPATIENT)
Dept: CARDIOLOGY CLINIC | Age: 83
End: 2018-07-18

## 2018-07-18 DIAGNOSIS — Z95.810 CARDIAC DEFIBRILLATOR IN SITU: Primary | ICD-10-CM

## 2018-07-19 ENCOUNTER — OFFICE VISIT (OUTPATIENT)
Dept: CARDIOLOGY CLINIC | Age: 83
End: 2018-07-19

## 2018-07-19 VITALS
OXYGEN SATURATION: 96 % | HEIGHT: 64 IN | WEIGHT: 171 LBS | BODY MASS INDEX: 29.19 KG/M2 | RESPIRATION RATE: 15 BRPM | SYSTOLIC BLOOD PRESSURE: 110 MMHG | DIASTOLIC BLOOD PRESSURE: 55 MMHG | HEART RATE: 95 BPM

## 2018-07-19 DIAGNOSIS — I50.22 CHRONIC SYSTOLIC HEART FAILURE (HCC): ICD-10-CM

## 2018-07-19 DIAGNOSIS — I10 ESSENTIAL HYPERTENSION: ICD-10-CM

## 2018-07-19 DIAGNOSIS — R06.02 SOB (SHORTNESS OF BREATH): Primary | ICD-10-CM

## 2018-07-19 RX ORDER — LOSARTAN POTASSIUM 25 MG/1
12.5 TABLET ORAL DAILY
Qty: 45 TAB | Refills: 3 | Status: SHIPPED | OUTPATIENT
Start: 2018-07-19 | End: 2018-11-15

## 2018-07-19 RX ORDER — LEVOTHYROXINE SODIUM 75 UG/1
75 TABLET ORAL
COMMUNITY
Start: 2018-05-05 | End: 2018-11-15 | Stop reason: ALTCHOICE

## 2018-07-19 RX ORDER — TRAZODONE HYDROCHLORIDE 50 MG/1
50 TABLET ORAL
Status: ON HOLD | COMMUNITY
Start: 2018-05-05 | End: 2019-07-31

## 2018-07-19 RX ORDER — DOXEPIN HYDROCHLORIDE 10 MG/1
10 CAPSULE ORAL
COMMUNITY
Start: 2018-04-23 | End: 2018-11-15 | Stop reason: ALTCHOICE

## 2018-07-19 NOTE — MR AVS SNAPSHOT
[address] [facility] 
[phone] Patient: Elijah Sullivan MRN: [id number] EIM:9/8/[birth year] Visit Information Date & Time Provider Department Dept. Phone Encounter #  
 7/19/2018  3:40 PM Gavioat Huffman MD [facility] Darlyn Rubio [phone] [id number] Your Appointments 11/1/2018  3:00 PM  
ECHO CARDIOGRAMS 2D with ECHO, ARELY  
[facility] (ADELA SCHEDULING) Appt Note: limited echo per dr Pavithra Vasquez ov at [address] [facility] [address]  
[phone]  
  
   
 [address] [facility] [address] [facility] [address] [address]  
  
    
 11/1/2018  3:40 PM  
ESTABLISHED PATIENT with Gaviota Huffman MD  
[facility] ([facility]) Appt Note: limited echo per dr Pavithra Vasquez ov at [address] [facility] [address] [facility] [address]  
[phone]  
  
   
 [address] [facility] [address] [place] [address]  
  
    
 1/16/2019  3:00 PM  
PACEMAKER with PACEMAKER, SANTI  
[facility] (ADELA SCHEDULING) Appt Note: raya sci icd/stf/long after [address] [facility] [address] [facility]  
[phone]  
  
   
 [address] [facility] [address] [place] [address] [address] [address]  
  
    
 1/16/2019  3:20 PM  
ESTABLISHED PATIENT with Mariela Moy MD  
[facility] ([facility]) Appt Note: annual visit [address] [facility] [address] [facility]  
[address] [address] [address] Upcoming Health Maintenance Date Due  
 FOOT EXAM Q1 7/5/1938 EYE EXAM RETINAL OR DILATED Q1 7/5/1938 ZOSTER VACCINE AGE 60> 5/5/1988 GLAUCOMA SCREENING Q2Y 7/5/1993 Pneumococcal 65+ Low/Medium Risk (1 of 2 - PCV13) 7/5/1993 MEDICARE YEARLY EXAM 3/14/2018 HEMOGLOBIN A1C Q6M 8/7/2018 Influenza Age 5 to Adult 8/1/2018 MICROALBUMIN Q1 2/7/2019 LIPID PANEL Q1 2/7/2019 DTaP/Tdap/Td series (2 - Td) 2/14/2027 Allergies as of 7/19/2018  Review Complete On: 7/19/2018 By: Evelina Rizo MD  
  
 Severity Noted Reaction Type Reactions Beta-blockers (Beta-adrenergic Blocking Agts)  12/01/2015    Unknown (comments) Made her feel tired, breakout on her back Other Medication  12/01/2015    Unknown (comments) Bromides Phenobarbital  12/01/2015    Unknown (comments) Sulfa (Sulfonamide Antibiotics)  01/15/2014    Swelling  
 rash Current Immunizations  Reviewed on 7/19/2018 Name Date Tdap 2/14/2017  3:17 AM  
  
 Reviewed by Ed Pelaez LPN on 1/65/9340 at  4:06 PM  
You Were Diagnosed With   
  
 Codes Comments SOB (shortness of breath)    -  Primary ICD-10-CM: R06.02 
ICD-9-CM: 786.05 Chronic systolic heart failure (HCC)     ICD-10-CM: I50.22 ICD-9-CM: 428.22 Essential hypertension     ICD-10-CM: I10 
ICD-9-CM: 401.9 Vitals BP Pulse Resp Height(growth percentile) Weight(growth percentile) SpO2  
 110/55 95 15 5' 4\" (1.626 m) 171 lb (77.6 kg) 96% BMI OB Status Smoking Status 29.35 kg/m2 Hysterectomy Never Smoker Vitals History BMI and BSA Data Body Mass Index Body Surface Area  
 29.35 kg/m 2 1.87 m 2 Preferred Pharmacy Pharmacy Name Phone Katja Dominique, [facility] [phone] Your Updated Medication List  
  
   
This list is accurate as of 7/19/18  4:30 PM.  Always use your most recent med list.  
  
  
  
  
 aspirin 81 mg chewable tablet Take 1 Tab by mouth daily. bumetanide 1 mg tablet Commonly known as:  Jany Bolus Take 1 Tab by mouth daily. Take an extra dose in the afternoon as needed for weight gain. carvedilol 6.25 mg tablet Commonly known as:  Jennifer Coley  
 TAKE 1 TABLET TWICE A DAY WITH MEALS  
  
 co-enzyme Q-10 50 mg capsule Commonly known as:  CO Q-10 Take 100 mg by mouth daily (after lunch). doxepin 10 mg capsule Commonly known as:  SINEquan Take 10 mg by mouth nightly. FISH OIL PO Take 1 Tab by mouth daily (after lunch). HYDROcodone-acetaminophen 5-325 mg per tablet Commonly known as:  Iven Kumar Take 1 Tab by mouth every four (4) hours as needed for Pain. Max Daily Amount: 6 Tabs. levothyroxine 75 mcg tablet Commonly known as:  SYNTHROID Take 75 mcg by mouth Daily (before breakfast). losartan 25 mg tablet Commonly known as:  COZAAR Take 0.5 Tabs by mouth daily. melatonin 10 mg Cap Take 5 mg by mouth nightly. metFORMIN 1,000 mg tablet Commonly known as:  GLUCOPHAGE Take 1,000 mg by mouth two (2) times daily (with meals). multivitamin tablet Commonly known as:  ONE A DAY Take 1 Tab by mouth daily (after lunch). omeprazole 20 mg capsule Commonly known as:  PRILOSEC Take 20 mg by mouth daily. potassium chloride 20 mEq tablet Commonly known as:  K-DUR, KLOR-CON Take 1 Tab by mouth daily. pravastatin 40 mg tablet Commonly known as:  PRAVACHOL Take 1 Tab by mouth nightly. PROBIOTIC PO Take  by mouth daily. sertraline 100 mg tablet Commonly known as:  ZOLOFT Take 100 mg by mouth nightly. traZODone 50 mg tablet Commonly known as:  Constantino Handy Take 50 mg by mouth nightly. VITAMIN D2 50,000 unit capsule Generic drug:  ergocalciferol Take one tablet on Monday, Wednesday and Friday. Prescriptions Sent to Pharmacy Refills  
 losartan (COZAAR) 25 mg tablet 3 Sig: Take 0.5 Tabs by mouth daily. Class: Normal  
 Pharmacy: [address] [place] [address], [address] [place] [address] Ph #: [phone] Route: Oral  
  
We Performed the Following CBC W/O DIFF [32282 CPT(R)] METABOLIC PANEL, COMPREHENSIVE [73131 CPT(R)] NT-PRO BNP [id number] CPT(R)] TSH 3RD GENERATION [99699 CPT(R)] Introducing [facility]! [facility] introduces avolution patient portal. Now you can access parts of your medical record, email your doctor's office, and request medication refills online. 1. In your internet browser, go to [email or web address] 2. Click on the First Time User? Click Here link in the Sign In box. You will see the New Member Sign Up page. 3. Enter your avolution Access Code exactly as it appears below. You will not need to use this code after youve completed the sign-up process. If you do not sign up before the expiration date, you must request a new code. · avolution Access Code: [email or web address] Expires: 10/17/2018  4:30 PM 
 
4. Enter the last four digits of your Social Security Number (xxxx) and Date of Birth (mm/dd/yyyy) as indicated and click Submit. You will be taken to the next sign-up page. 5. Create a avolution ID. This will be your avolution login ID and cannot be changed, so think of one that is secure and easy to remember. 6. Create a avolution password. You can change your password at any time. 7. Enter your Password Reset Question and Answer. This can be used at a later time if you forget your password. 8. Enter your e-mail address. You will receive e-mail notification when new information is available in [address] 9. Click Sign Up. You can now view and download portions of your medical record. 10. Click the Download Summary menu link to download a portable copy of your medical information. If you have questions, please visit the Frequently Asked Questions section of the avolution website. Remember, avolution is NOT to be used for urgent needs. For medical emergencies, dial 911. Now available from your iPhone and Android! Please provide this summary of care documentation to your next provider. Your primary care clinician is listed as Kip Board. If you have any questions after today's visit, please call [phone].

## 2018-07-19 NOTE — PROGRESS NOTES
Irvin Jefferson MD. [facility]              Patient: Govind Haynes  : 1928      Today's Date: 2018            HISTORY OF PRESENT ILLNESS:     History of Present Illness:  Ms. Dominique Forbes is here for follow-up. She lost her balance and fell in April. Still has HARTLEY. BP still runs down. Does not have much energy.                PAST MEDICAL HISTORY:     Past Medical History:   Diagnosis Date    Arthritis     Breast CA ([address]     Breast cancer ([address]     Diabetes ([address]     Diarrhea     chronic     Dyslipidemia     GERD (gastroesophageal reflux disease)     Hearing loss     Hypertension     LBBB (left bundle branch block)     chronic LBBB    Melanoma ([address]     Non-ischemic cardiomyopathy ([address]     Cath 2/16/15 - Mild (non-obstructive) CAD,LVEF 30%, 2+ MR    Seizures (HCC)     X1 post partum after first child was born- no more seizures    Skin cancer     melanoma on legs    Sleep apnea     wears O2 at night- cannot tolerate CPAP    Snoring     SOB (shortness of breath)     Systolic CHF ([address]     Admission 16    Thyroid disease        Past Surgical History:   Procedure Laterality Date    HX BREAST LUMPECTOMY  2003    right    HX CATARACT REMOVAL      bilateral    HX CHOLECYSTECTOMY      HX DILATION AND CURETTAGE  1960    HX HEART CATHETERIZATION      x2- no blockages    HX HEENT      surgery on upper jaw    HX HYSTERECTOMY  1967    HX HYSTEROSCOPY      HX OOPHORECTOMY  1967    left    HX ORTHOPAEDIC  2010    right knee replacement    HX ORTHOPAEDIC      repair torn meniscus left knee    HX ORTHOPAEDIC      repair knuckle on left pinky finger    HX OTHER SURGICAL  2000    remove benign tumore right side between rib and right lung    HX PACEMAKER      HX PARTIAL THYROIDECTOMY      HX UROLOGICAL      bladder surgery x2           MEDICATIONS:     Current Outpatient Prescriptions   Medication Sig Dispense Refill    levothyroxine (SYNTHROID) 75 mcg tablet Take 75 mcg by mouth Daily (before breakfast).  losartan (COZAAR) 25 mg tablet TAKE 1 TABLET NIGHTLY 90 Tab 0    carvedilol (COREG) 6.25 mg tablet TAKE 1 TABLET TWICE A DAY WITH MEALS 180 Tab 0    bumetanide (BUMEX) 1 mg tablet Take 1 Tab by mouth daily. Take an extra dose in the afternoon as needed for weight gain. 90 Tab 3    potassium chloride (K-DUR, KLOR-CON) 20 mEq tablet Take 1 Tab by mouth daily. 30 Tab 3    pravastatin (PRAVACHOL) 40 mg tablet Take 1 Tab by mouth nightly. 90 Tab 3    LACTOBACILLUS ACIDOPHILUS (PROBIOTIC PO) Take  by mouth daily.  omeprazole (PRILOSEC) 20 mg capsule Take 20 mg by mouth daily.  aspirin 81 mg chewable tablet Take 1 Tab by mouth daily. 30 Tab 0    co-enzyme Q-10 (CO Q-10) 50 mg capsule Take 100 mg by mouth daily (after lunch).  sertraline (ZOLOFT) 100 mg tablet Take 100 mg by mouth nightly.  metFORMIN (GLUCOPHAGE) 1,000 mg tablet Take 1,000 mg by mouth two (2) times daily (with meals).  ergocalciferol (VITAMIN D2) 50,000 unit capsule Take one tablet on Monday, Wednesday and Friday.  melatonin 10 mg cap Take 5 mg by mouth nightly.  multivitamin (ONE A DAY) tablet Take 1 Tab by mouth daily (after lunch).  DOCOSAHEXANOIC ACID/EPA (FISH OIL PO) Take 1 Tab by mouth daily (after lunch).  traZODone (DESYREL) 50 mg tablet Take 50 mg by mouth nightly.  doxepin (SINEQUAN) 10 mg capsule Take 10 mg by mouth nightly.  HYDROcodone-acetaminophen (NORCO) 5-325 mg per tablet Take 1 Tab by mouth every four (4) hours as needed for Pain. Max Daily Amount: 6 Tabs.  (Patient not taking: Reported on 7/19/2018) 4 Tab 0       Allergies   Allergen Reactions    Beta-Blockers (Beta-Adrenergic Blocking Agts) Unknown (comments)     Made her feel tired, breakout on her back    Other Medication Unknown (comments)     Bromides    Phenobarbital Unknown (comments)    Sulfa (Sulfonamide Antibiotics) Swelling     rash             SOCIAL HISTORY: Social History   Substance Use Topics    Smoking status: Never Smoker    Smokeless tobacco: Never Used    Alcohol use 2.4 oz/week     3 Glasses of wine, 1 Shots of liquor per week         FAMILY HISTORY:     Family History   Problem Relation Age of Onset    Stroke Mother              REVIEW OF SYSTEMS:             Review of Symptoms:  Constitutional: Negative for fever, chills  HEENT: Negative for nosebleeds, tinnitus, and vision changes.    Respiratory: + HARTLEY, occ cough   Cardiovascular: Negative for  Syncope    Gastrointestinal: Negative for   melena.    Genitourinary: + recent UTI    Musculoskeletal: Negative for myalgias.    Skin: Negative for rash  Heme: No problems bleeding. Neurological: Negative for speech change and focal weakness.                PHYSICAL EXAM:             Physical Exam:  Visit Vitals    /58 (BP 1 Location: Left arm, BP Patient Position: Sitting)    Pulse 95    Resp 15    Ht 5' 4\" (1.626 m)    Wt 171 lb (77.6 kg)    SpO2 96%    BMI 29.35 kg/m2         Patient appears generally well, mood and affect are appropriate and pleasant. HEENT:  Hearing intact, non-icteric, normocephalic, atraumatic.    Neck Exam: Supple, No sig JVD sitting up. No carotid bruits.    Lung Exam: Clear to auscultation, even breath sounds.    Cardiac Exam: Regular rate and rhythm with no murmur  Abdomen: Soft, non-tender    Extremities: Moves all ext well. No lower extremity edema. Psych: Appropriate affect  Neuro - Grossly intact              LABS / OTHER STUDIES:              Labs 2/7/18 - TSH 0.4, A1c 6.6, chol 132, TG 99, HDL 56, LDL 56, Cr 1.14, K 4.7, LFT's OK, Hgb 10.1, plt 199        [facility]  CARDIAC DIAGNOSTICS:                 Cardiac Evaluation Includes:  EKG 2/13/16 - NSR, IVCD, PRWP      Echo 2/13/16 - TDS.   LVEF 30-35%.   There was moderate diffuse hypokinesis - Only the basal anterolateral and infrolateral walls contracts normally.  RV size and function normal.  RVSP 35      CTA chest 2/13/16 - No evidence of pulmonary embolus. Mild pulmonary interstitial edema and small bilateral pleural effusions. Small bilateral indeterminate pulmonary nodules, measuring up to 4 mm.      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 Moderate to severe deep white matter ischemic change. 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 No new confluent infarct or hemorrhage or mass effect.      Echo 2/8/18  - LVEF 50%, grade 1 diastology, RV normal, RVSP 35           EKG 12/1/15 - NSR, Nonspecific intraventricular block,   EKG 1/3/18 - V paced                 ASSESSMENT AND PLAN:             Assessment and Plan:  1) Acute / Chronic decompensated CHF (systolic dysfunction, HFrEF)(non-ischemic CM) - discovered 2/13/16   - Echo 4/14/16 shows that LVEF still is 30%.   Echo 5/26/16 shows LVEF just 20%.     Echo 7/28/16 with LVEF 25%. - BIV ICD placed 8/15/16   - She felt a little better afterwards    - On Echo 2/2/17 LVEF is better at 40%. 2/8/18, LVEF improved to 50%  - continue Coreg.    - Due to cough, switched from lisinopril to losartan. She could not tolerate Entresto at all.    - Due to prior falls (and risk of orthostasis), have kept meds on lower doses   - volume status is fair on Bumex  - I strongly recommend cardiac rehab, but she declines (she says she cannot make it there) - instead she will try yoga   - On 7/19/18 - still feels tired. BP runs low. Will have her try cutting Bumex to 1 mg in AM with afternoon dose just as needed. Also cut losartan dose in half.   Recheck a limited echo for LVEF in 3 months.        2) HTN    - BP runs low sometimes   - continue meds       3) See me in 3 months.  Patient expressed understanding of the plan - questions were answered.       On a side note, she is from [place] and worked for years as a .   Daniel Don MD, [address]  [address] [facility] [address], [address]      [address] [id number]  [address]  [facility], [address] [place] [address]  Ph: [phone]                                [phone]

## 2018-07-19 NOTE — PROGRESS NOTES
Chief Complaint   Patient presents with    Hypertension    CHF    Follow-up     1. Have you been to the ER, urgent care clinic since your last visit? Hospitalized since your last visit? Yes, 4/21/18, [facility], Cast problem    2. Have you seen or consulted any other health care providers outside of the [facility] since your last visit? Include any pap smears or colon screening. No    Visit Vitals    /58 (BP 1 Location: Left arm, BP Patient Position: Sitting)    Pulse 95    Resp 15    Ht 5' 4\" (1.626 m)    Wt 171 lb (77.6 kg)    SpO2 96%    BMI 29.35 kg/m2     Patient is not sure if she is taking Trazodone and Doxepin.

## 2018-07-24 RX ORDER — CARVEDILOL 6.25 MG/1
TABLET ORAL
Qty: 180 TAB | Refills: 0 | Status: SHIPPED | OUTPATIENT
Start: 2018-07-24 | End: 2018-10-22 | Stop reason: SDUPTHER

## 2018-08-07 LAB
ALBUMIN SERPL-MCNC: 4.2 G/DL (ref 3.2–4.6)
ALBUMIN/GLOB SERPL: 1.8 {RATIO} (ref 1.2–2.2)
ALP SERPL-CCNC: 50 IU/L (ref 39–117)
ALT SERPL-CCNC: 10 IU/L (ref 0–32)
AST SERPL-CCNC: 16 IU/L (ref 0–40)
BILIRUB SERPL-MCNC: 0.4 MG/DL (ref 0–1.2)
BUN SERPL-MCNC: 32 MG/DL (ref 10–36)
BUN/CREAT SERPL: 25 (ref 12–28)
CALCIUM SERPL-MCNC: 9.4 MG/DL (ref 8.7–10.3)
CHLORIDE SERPL-SCNC: 104 MMOL/L (ref 96–106)
CO2 SERPL-SCNC: 22 MMOL/L (ref 20–29)
CREAT SERPL-MCNC: 1.29 MG/DL (ref 0.57–1)
ERYTHROCYTE [DISTWIDTH] IN BLOOD BY AUTOMATED COUNT: 16.8 % (ref 12.3–15.4)
GLOBULIN SER CALC-MCNC: 2.4 G/DL (ref 1.5–4.5)
GLUCOSE SERPL-MCNC: 173 MG/DL (ref 65–99)
HCT VFR BLD AUTO: 31 % (ref 34–46.6)
HGB BLD-MCNC: 10.1 G/DL (ref 11.1–15.9)
INTERPRETATION: NORMAL
MCH RBC QN AUTO: 32.1 PG (ref 26.6–33)
MCHC RBC AUTO-ENTMCNC: 32.6 G/DL (ref 31.5–35.7)
MCV RBC AUTO: 98 FL (ref 79–97)
NT-PROBNP SERPL-MCNC: 1176 PG/ML (ref 0–738)
PLATELET # BLD AUTO: 241 X10E3/UL (ref 150–379)
POTASSIUM SERPL-SCNC: 5 MMOL/L (ref 3.5–5.2)
PROT SERPL-MCNC: 6.6 G/DL (ref 6–8.5)
RBC # BLD AUTO: 3.15 X10E6/UL (ref 3.77–5.28)
SODIUM SERPL-SCNC: 141 MMOL/L (ref 134–144)
TSH SERPL DL<=0.005 MIU/L-ACNC: 0.63 UIU/ML (ref 0.45–4.5)
WBC # BLD AUTO: 5.1 X10E3/UL (ref 3.4–10.8)

## 2018-10-22 DIAGNOSIS — I10 ESSENTIAL HYPERTENSION: ICD-10-CM

## 2018-10-22 DIAGNOSIS — R06.02 SOB (SHORTNESS OF BREATH): ICD-10-CM

## 2018-10-22 DIAGNOSIS — I50.22 CHRONIC SYSTOLIC HEART FAILURE (HCC): ICD-10-CM

## 2018-10-23 RX ORDER — CARVEDILOL 6.25 MG/1
TABLET ORAL
Qty: 180 TAB | Refills: 0 | Status: SHIPPED | OUTPATIENT
Start: 2018-10-23 | End: 2019-01-20 | Stop reason: SDUPTHER

## 2018-11-15 ENCOUNTER — CLINICAL SUPPORT (OUTPATIENT)
Dept: CARDIOLOGY CLINIC | Age: 83
End: 2018-11-15

## 2018-11-15 ENCOUNTER — OFFICE VISIT (OUTPATIENT)
Dept: CARDIOLOGY CLINIC | Age: 83
End: 2018-11-15

## 2018-11-15 VITALS
HEIGHT: 64 IN | BODY MASS INDEX: 29.19 KG/M2 | SYSTOLIC BLOOD PRESSURE: 128 MMHG | WEIGHT: 171 LBS | DIASTOLIC BLOOD PRESSURE: 70 MMHG | HEART RATE: 68 BPM

## 2018-11-15 DIAGNOSIS — R06.02 SOB (SHORTNESS OF BREATH): ICD-10-CM

## 2018-11-15 DIAGNOSIS — I50.22 CHRONIC SYSTOLIC HEART FAILURE (HCC): ICD-10-CM

## 2018-11-15 DIAGNOSIS — Z95.810 BIVENTRICULAR AUTOMATIC IMPLANTABLE CARDIOVERTER DEFIBRILLATOR IN SITU: ICD-10-CM

## 2018-11-15 DIAGNOSIS — I10 ESSENTIAL HYPERTENSION: Primary | ICD-10-CM

## 2018-11-15 DIAGNOSIS — I50.22 CHRONIC SYSTOLIC HEART FAILURE (HCC): Primary | ICD-10-CM

## 2018-11-15 DIAGNOSIS — I10 ESSENTIAL HYPERTENSION: ICD-10-CM

## 2018-11-15 RX ORDER — LOSARTAN POTASSIUM 25 MG/1
25 TABLET ORAL DAILY
Qty: 30 TAB | Refills: 0 | Status: ON HOLD
Start: 2018-11-15 | End: 2019-07-31

## 2018-11-15 RX ORDER — LEVOTHYROXINE SODIUM 88 UG/1
TABLET ORAL
COMMUNITY
End: 2019-01-16 | Stop reason: DRUGHIGH

## 2018-11-15 NOTE — PROGRESS NOTES
The patient was identified by name and date of birth.  The test was explained to patient and questions were answered prior to testing. '

## 2019-01-16 ENCOUNTER — CLINICAL SUPPORT (OUTPATIENT)
Dept: CARDIOLOGY CLINIC | Age: 84
End: 2019-01-16

## 2019-01-16 ENCOUNTER — HOSPITAL ENCOUNTER (OUTPATIENT)
Dept: LAB | Age: 84
Discharge: HOME | End: 2019-01-16
Payer: MEDICARE

## 2019-01-16 ENCOUNTER — OFFICE VISIT (OUTPATIENT)
Dept: CARDIOLOGY CLINIC | Age: 84
End: 2019-01-16

## 2019-01-16 VITALS
BODY MASS INDEX: 30.7 KG/M2 | WEIGHT: 179.8 LBS | RESPIRATION RATE: 20 BRPM | HEART RATE: 92 BPM | HEIGHT: 64 IN | DIASTOLIC BLOOD PRESSURE: 52 MMHG | SYSTOLIC BLOOD PRESSURE: 130 MMHG | OXYGEN SATURATION: 96 %

## 2019-01-16 DIAGNOSIS — Z95.810 BIVENTRICULAR AUTOMATIC IMPLANTABLE CARDIOVERTER DEFIBRILLATOR IN SITU: Primary | ICD-10-CM

## 2019-01-16 DIAGNOSIS — R06.02 SHORTNESS OF BREATH: ICD-10-CM

## 2019-01-16 DIAGNOSIS — Z95.810 CARDIAC DEFIBRILLATOR IN SITU: Primary | ICD-10-CM

## 2019-01-16 DIAGNOSIS — R53.83 FATIGUE, UNSPECIFIED TYPE: ICD-10-CM

## 2019-01-16 PROCEDURE — 85027 COMPLETE CBC AUTOMATED: CPT

## 2019-01-16 PROCEDURE — 84443 ASSAY THYROID STIM HORMONE: CPT

## 2019-01-16 PROCEDURE — 36415 COLL VENOUS BLD VENIPUNCTURE: CPT

## 2019-01-16 RX ORDER — LEVOTHYROXINE SODIUM 75 UG/1
75 TABLET ORAL
COMMUNITY
Start: 2018-05-05 | End: 2019-08-29 | Stop reason: SDUPTHER

## 2019-01-16 NOTE — PROGRESS NOTES
Room # 6    SOB, fatigue  and chest tightness, and cramping in left hands since Eddie  Fell 2 weeks ago getting out of bed, states left knee gave out and she fell and hit her left upper arm and left knee    Visit Vitals  /52 (BP 1 Location: Left arm, BP Patient Position: Sitting)   Pulse 92   Resp 20   Ht 5' 4\" (1.626 m)   Wt 179 lb 12.8 oz (81.6 kg)   SpO2 96%   BMI 30.86 kg/m²

## 2019-01-16 NOTE — PROGRESS NOTES
HISTORY OF PRESENTING ILLNESS      Aiden Red is a 80 y.o. female with NICM, LVEF 20%, hypertension, NYHA class III, LBBB, biventricular ICD therapy. She continues to have shortness of breath with exertion and fatigue. Her device interrogation shows normal device functioning. Previously,  LV offset was added and rate response was turned on given her exertional shortness of breath and due to heart rates predominantly in the 70's to 80's. She now presents for follow-up and is noted on device interrogation to have adequate biventricular pacing percentage. However, she reports worsening shortness of breath and fatigue over the past 2-3 months. She does report that her Synthroid dose was lowered 6 months ago. She denies chest pain. She was evaluated several months ago regarding her bladder issues and was under the impression that she had anemia. She denies hematuria, hematochezia or melena.        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 [facility] 01/03/2018    HFrEF (heart failure with reduced ejection fraction) ([facility] 08/23/2016    DM type 2 (diabetes mellitus, type 2) ([facility] 02/13/2016    Hypertension     GERD (gastroesophageal reflux disease)     Sleep apnea            PAST MEDICAL HISTORY     Past Medical History:   Diagnosis Date    Arthritis     [facility]     Breast cancer ([facility]     Diabetes ([facility]     Diarrhea     chronic     Dyslipidemia     GERD (gastroesophageal reflux disease)     Hearing loss     Hypertension     LBBB (left bundle branch block)     chronic LBBB    [facility]anoma ([facility]     Non-ischemic cardiomyopathy ([facility]     Cath 2/16/15 - Mild (non-obstructive) CAD,LVEF 30%, 2+ MR    Seizures (HCC)     X1 post partum after first child was born- no more seizures    Skin cancer     melanoma on legs    Sleep apnea wears O2 at night- cannot tolerate CPAP    Snoring     SOB (shortness of breath)     Systolic [facility])     Admission 2/13/16    Thyroid disease            PAST SURGICAL HISTORY     Past Surgical History:   Procedure Laterality Date    HX BREAST LUMPECTOMY  2003    right    HX CATARACT REMOVAL  1994    bilateral    HX CHOLECYSTECTOMY      HX DILATION AND CURETTAGE  1960    HX HEART CATHETERIZATION      x2- no blockages    HX HEENT  2004    surgery on upper jaw    HX HYSTERECTOMY  1967    HX HYSTEROSCOPY      HX OOPHORECTOMY  1967    left    HX ORTHOPAEDIC  2010    right knee replacement    HX ORTHOPAEDIC      repair torn meniscus left knee    HX ORTHOPAEDIC  1996    repair knuckle on left pinky finger    HX OTHER SURGICAL  2000    remove benign tumore right side between rib and right lung    HX PACEMAKER      HX PARTIAL THYROIDECTOMY      HX UROLOGICAL  2003    bladder surgery x2          ALLERGIES     Allergies   Allergen Reactions    Beta-Blockers (Beta-Adrenergic Blocking Agts) Unknown (comments)     Made her feel tired, breakout on her back    Other Medication Unknown (comments)     Bromides    Phenobarbital Unknown (comments)    Sulfa (Sulfonamide Antibiotics) Swelling     rash          FAMILY HISTORY     Family History   Problem Relation Age of Onset    Stroke Mother     negative for cardiac disease       SOCIAL HISTORY     Social History     Socioeconomic History    Marital status:      Spouse name: Not on file    Number of children: Not on file    Years of education: Not on file    Highest education level: Not on file   Tobacco Use    Smoking status: Never Smoker    Smokeless tobacco: Never Used   Substance and Sexual Activity    Alcohol use:  Yes     Alcohol/week: 2.4 oz     Types: 3 Glasses of wine, 1 Shots of liquor per week    Drug use: No    Sexual activity: No         MEDICATIONS     Current Outpatient Medications   Medication Sig    levothyroxine (SYNTHROID) 75 mcg tablet Take 75 mcg by mouth.  losartan (COZAAR) 25 mg tablet Take 1 Tab by mouth daily.  carvedilol (COREG) 6.25 mg tablet TAKE 1 TABLET TWICE A DAY WITH MEALS    traZODone (DESYREL) 50 mg tablet Take 50 mg by mouth nightly.  bumetanide (BUMEX) 1 mg tablet Take 1 Tab by mouth daily. Take an extra dose in the afternoon as needed for weight gain.  potassium chloride (K-DUR, KLOR-CON) 20 mEq tablet Take 1 Tab by mouth daily.  pravastatin (PRAVACHOL) 40 mg tablet Take 1 Tab by mouth nightly.  LACTOBACILLUS ACIDOPHILUS (PROBIOTIC PO) Take  by mouth daily.  aspirin 81 mg chewable tablet Take 1 Tab by mouth daily.  co-enzyme Q-10 (CO Q-10) 50 mg capsule Take 100 mg by mouth daily (after lunch).  sertraline (ZOLOFT) 100 mg tablet Take 100 mg by mouth nightly.  metFORMIN (GLUCOPHAGE) 1,000 mg tablet Take 1,000 mg by mouth two (2) times daily (with meals).  ergocalciferol (VITAMIN D2) 50,000 unit capsule Take one tablet on Monday, Wednesday and Friday.  melatonin 10 mg cap Take 5 mg by mouth nightly.  multivitamin (ONE A DAY) tablet Take 1 Tab by mouth daily (after lunch).  DOCOSAHEXANOIC ACID/EPA (FISH OIL PO) Take 1 Tab by mouth daily (after lunch). No current facility-administered medications for this visit. I have reviewed the nurses notes, vitals, problem list, allergy list, medical history, family, social history and medications. REVIEW OF SYMPTOMS      General: Pt denies excessive weight gain or loss. Pt is able to conduct ADL's  HEENT: Denies blurred vision, headaches, hearing loss, epistaxis and difficulty swallowing. Respiratory: Denies cough, congestion, shortness of breath, HARTLEY, wheezing or stridor.   Cardiovascular: Denies precordial pain, palpitations, edema or PND  Gastrointestinal: Denies poor appetite, indigestion, abdominal pain or blood in stool  Genitourinary: Denies hematuria, dysuria, increased urinary frequency  Musculoskeletal: Denies joint pain or swelling from muscles or joints  Neurologic: Denies tremor, paresthesias, headache, or sensory motor disturbance  Psychiatric: Denies confusion, insomnia, depression  Integumentray: Denies rash, itching or ulcers. Hematologic: Denies easy bruising, bleeding       PHYSICAL EXAMINATION      Vitals:    01/16/19 1522   BP: 130/52   Pulse: 92   Resp: 20   SpO2: 96%   Weight: 179 lb 12.8 oz (81.6 kg)   Height: 5' 4\" (1.626 m)     General: Well developed, in no acute distress. HEENT: No jaundice, oral mucosa moist, no oral ulcers  Neck: Supple, no stiffness, no lymphadenopathy, supple  Heart:  Normal S1/S2 negative S3 or S4. Regular, no murmur, gallop or rub, no jugular venous distention  Respiratory: Clear bilaterally x 4, no wheezing or rales  Abdomen:   Soft, non-tender, bowel sounds are active.   Extremities:  No edema, normal cap refill, no cyanosis. Musculoskeletal: No clubbing, no deformities  Neuro: A&Ox3, speech clear, gait stable, cooperative, no focal neurologic deficits  Skin: Skin color is normal. No rashes or lesions.  Non diaphoretic, moist.  Vascular: 2+ pulses symmetric in all extremities       DIAGNOSTIC DATA      EKG: Ventricular paced rhythm       LABORATORY DATA      Lab Results   Component Value Date/Time    WBC 5.1 08/06/2018 03:16 PM    Hemoglobin (POC) 12.6 01/15/2014 12:49 PM    HGB 10.1 (L) 08/06/2018 03:16 PM    Hematocrit (POC) 37 01/15/2014 12:49 PM    HCT 31.0 (L) 08/06/2018 03:16 PM    PLATELET 650 56/48/6572 03:16 PM    MCV 98 (H) 08/06/2018 03:16 PM      Lab Results   Component Value Date/Time    Sodium 141 08/06/2018 03:16 PM    Potassium 5.0 08/06/2018 03:16 PM    Chloride 104 08/06/2018 03:16 PM    CO2 22 08/06/2018 03:16 PM    Anion gap 10 02/18/2016 03:30 AM    Glucose 173 (H) 08/06/2018 03:16 PM    BUN 32 08/06/2018 03:16 PM    Creatinine 1.29 (H) 08/06/2018 03:16 PM    BUN/Creatinine ratio 25 08/06/2018 03:16 PM    GFR est AA 42 (L) 08/06/2018 03:16 PM    GFR est non-AA 37 (L) 08/06/2018 03:16 PM    Calcium 9.4 08/06/2018 03:16 PM    Bilirubin, total 0.4 08/06/2018 03:16 PM    AST (SGOT) 16 08/06/2018 03:16 PM    Alk. phosphatase 50 08/06/2018 03:16 PM    Protein, total 6.6 08/06/2018 03:16 PM    Albumin 4.2 08/06/2018 03:16 PM    Globulin 2.9 02/13/2016 05:40 AM    A-G Ratio 1.8 08/06/2018 03:16 PM    ALT (SGPT) 10 08/06/2018 03:16 PM           ASSESSMENT      1. Cardiomyopathy                         A. NICM                        B. NYHA class III                        C. LBBB ( ms)  2. Hypertension  3. Fatigue  4. Shortness of breath  5. ICD              A. [address]     Patient's symptom progression over the past few months is of unclear etiology. However, her biventricular pacing has been optimized already. Will arrange for a chest x-ray and blood work including CBC and TSH for further evaluation regarding the etiology of her presenting symptoms. She will follow-up with her primary care physician regarding these test.  Continue follow-up in device clinic. FOLLOW-UP     1 year      Thank you, Carmella Jones MD for allowing me to participate in the care of this extraordinarily pleasant female. Please do not hesitate to contact me for further questions/concerns.          Darlyn Rivas MD  Cardiac Electrophysiology / Cardiology    [place] [address].  [address], [address] [facility], [place] [address]  [place] [address] [place] [address]  [phone] / [phone] Fax   [phone] / [phone] Fax

## 2019-01-17 LAB
ERYTHROCYTE [DISTWIDTH] IN BLOOD BY AUTOMATED COUNT: 15.3 % (ref 12.3–15.4)
HCT VFR BLD AUTO: 28.5 % (ref 34–46.6)
HGB BLD-MCNC: 9.2 G/DL (ref 11.1–15.9)
MCH RBC QN AUTO: 33 PG (ref 26.6–33)
MCHC RBC AUTO-ENTMCNC: 32.3 G/DL (ref 31.5–35.7)
MCV RBC AUTO: 102 FL (ref 79–97)
PLATELET # BLD AUTO: 261 X10E3/UL (ref 150–379)
RBC # BLD AUTO: 2.79 X10E6/UL (ref 3.77–5.28)
TSH SERPL DL<=0.005 MIU/L-ACNC: 1.08 UIU/ML (ref 0.45–4.5)
WBC # BLD AUTO: 5.6 X10E3/UL (ref 3.4–10.8)

## 2019-01-20 DIAGNOSIS — I50.22 CHRONIC SYSTOLIC HEART FAILURE (HCC): ICD-10-CM

## 2019-01-20 DIAGNOSIS — R06.02 SOB (SHORTNESS OF BREATH): ICD-10-CM

## 2019-01-20 DIAGNOSIS — I10 ESSENTIAL HYPERTENSION: ICD-10-CM

## 2019-01-23 RX ORDER — CARVEDILOL 6.25 MG/1
TABLET ORAL
Qty: 180 TAB | Refills: 0 | Status: SHIPPED | OUTPATIENT
Start: 2019-01-23 | End: 2019-04-21 | Stop reason: SDUPTHER

## 2019-02-22 ENCOUNTER — HOSPITAL ENCOUNTER (OUTPATIENT)
Dept: GENERAL RADIOLOGY | Age: 84
Discharge: HOME | End: 2019-02-22
Attending: FAMILY MEDICINE
Payer: MEDICARE

## 2019-02-22 ENCOUNTER — HOSPITAL ENCOUNTER (OUTPATIENT)
Dept: MAMMOGRAPHY | Age: 84
Discharge: HOME | End: 2019-02-22
Attending: FAMILY MEDICINE
Payer: MEDICARE

## 2019-02-22 DIAGNOSIS — R06.02 SHORTNESS OF BREATH: ICD-10-CM

## 2019-02-22 DIAGNOSIS — Z12.39 SCREENING BREAST EXAMINATION: ICD-10-CM

## 2019-02-22 DIAGNOSIS — Z95.810 BIVENTRICULAR AUTOMATIC IMPLANTABLE CARDIOVERTER DEFIBRILLATOR IN SITU: ICD-10-CM

## 2019-02-22 DIAGNOSIS — R53.83 FATIGUE, UNSPECIFIED TYPE: ICD-10-CM

## 2019-02-22 PROCEDURE — 71046 X-RAY EXAM CHEST 2 VIEWS: CPT

## 2019-02-22 PROCEDURE — 77067 SCR MAMMO BI INCL CAD: CPT

## 2019-04-21 DIAGNOSIS — I10 ESSENTIAL HYPERTENSION: ICD-10-CM

## 2019-04-21 DIAGNOSIS — R06.02 SOB (SHORTNESS OF BREATH): ICD-10-CM

## 2019-04-21 DIAGNOSIS — I50.22 CHRONIC SYSTOLIC HEART FAILURE (HCC): ICD-10-CM

## 2019-04-26 RX ORDER — CARVEDILOL 6.25 MG/1
TABLET ORAL
Qty: 180 TAB | Refills: 0 | Status: SHIPPED | OUTPATIENT
Start: 2019-04-26

## 2019-04-26 NOTE — TELEPHONE ENCOUNTER
Verbal order for refill per Dr. Jaclyn Guadarrama    Requested Prescriptions     Signed Prescriptions Disp Refills    carvedilol (COREG) 6.25 mg tablet 180 Tab 0     Sig: TAKE 1 TABLET TWICE A DAY WITH MEALS     Authorizing Provider: Oralia Longo     Ordering User: Yessy Pugh

## 2019-04-30 ENCOUNTER — CLINICAL SUPPORT (OUTPATIENT)
Dept: CARDIOLOGY CLINIC | Age: 84
End: 2019-04-30

## 2019-04-30 DIAGNOSIS — Z95.810 AUTOMATIC IMPLANTABLE CARDIAC DEFIBRILLATOR IN SITU: Primary | ICD-10-CM

## 2019-05-23 ENCOUNTER — OFFICE VISIT (OUTPATIENT)
Dept: CARDIOLOGY CLINIC | Age: 84
End: 2019-05-23

## 2019-05-23 VITALS
DIASTOLIC BLOOD PRESSURE: 50 MMHG | HEART RATE: 93 BPM | OXYGEN SATURATION: 95 % | SYSTOLIC BLOOD PRESSURE: 108 MMHG | WEIGHT: 172 LBS | BODY MASS INDEX: 29.37 KG/M2 | HEIGHT: 64 IN

## 2019-05-23 DIAGNOSIS — I10 ESSENTIAL HYPERTENSION: ICD-10-CM

## 2019-05-23 DIAGNOSIS — I50.22 CHRONIC SYSTOLIC HEART FAILURE (HCC): ICD-10-CM

## 2019-05-23 DIAGNOSIS — R06.02 SOB (SHORTNESS OF BREATH): Primary | ICD-10-CM

## 2019-05-23 RX ORDER — BUMETANIDE 1 MG/1
1 TABLET ORAL DAILY
Qty: 90 TAB | Refills: 3 | Status: ON HOLD | OUTPATIENT
Start: 2019-05-23 | End: 2019-08-03 | Stop reason: SDUPTHER

## 2019-05-23 NOTE — PROGRESS NOTES
Chief Complaint   Patient presents with    CHF     Systolic    Hypertension    Shortness of Breath     Visit Vitals  /50 (BP 1 Location: Left arm, BP Patient Position: Sitting)   Pulse 93   Ht 5' 4\" (1.626 m)   Wt 172 lb (78 kg)   SpO2 95%   BMI 29.52 kg/m²     Chest pain denied  SOB denied; very tired. \"I think it is because my diabetes went up. \"  Dizziness denied  Swelling denied  Recent hospital visit denied  Refills bumex with [facility]

## 2019-05-23 NOTE — PROGRESS NOTES
Mo Phipps MD. [facility]              Patient: Darron Stringer  : 1928      Today's Date: 2019            HISTORY OF PRESENT ILLNESS:     History of Present Illness:  Here for follow-up. She is tried often. Not much energy. Not walking much since it was cold. Breathing is OK. No orthopnea. No major dizziness or syncope. Has been out of bumex for a week - was taking it daily before.               PAST MEDICAL HISTORY:     Past Medical History:   Diagnosis Date    Arthritis     Breast CA ([address]     Breast cancer ([address]     Diabetes [facility]     Diarrhea     chronic     Dyslipidemia     GERD (gastroesophageal reflux disease)     Hearing loss     Hypertension     LBBB (left bundle branch block)     chronic LBBB    Melanoma ([facility]     Non-ischemic cardiomyopathy ([facility]     Cath 2/16/15 - Mild (non-obstructive) CAD,LVEF 30%, 2+ MR    Seizures (HCC)     X1 post partum after first child was born- no more seizures    Skin cancer     melanoma on legs    Sleep apnea     wears O2 at night- cannot tolerate CPAP    Snoring     SOB (shortness of breath)     Systolic CHF ([facility]     Admission 16    Thyroid disease          Past Surgical History:   Procedure Laterality Date    HX BREAST LUMPECTOMY  2003    right    HX CATARACT REMOVAL      bilateral    HX CHOLECYSTECTOMY      HX DILATION AND CURETTAGE  1960    HX HEART CATHETERIZATION      x2- no blockages    HX HEENT      surgery on upper jaw    HX HYSTERECTOMY  1967    HX HYSTEROSCOPY      HX OOPHORECTOMY  1967    left    HX ORTHOPAEDIC      right knee replacement    HX ORTHOPAEDIC      repair torn meniscus left knee    HX ORTHOPAEDIC      repair knuckle on left pinky finger    HX OTHER SURGICAL  2000    remove benign tumore right side between rib and right lung    HX PACEMAKER      HX PARTIAL THYROIDECTOMY      HX UROLOGICAL  2003    bladder surgery x2           MEDICATIONS:     Current Outpatient Medications Medication Sig Dispense Refill    carvedilol (COREG) 6.25 mg tablet TAKE 1 TABLET TWICE A DAY WITH MEALS 180 Tab 0    levothyroxine (SYNTHROID) 75 mcg tablet Take 75 mcg by mouth.  losartan (COZAAR) 25 mg tablet Take 1 Tab by mouth daily. 30 Tab 0    traZODone (DESYREL) 50 mg tablet Take 50 mg by mouth nightly.  bumetanide (BUMEX) 1 mg tablet Take 1 Tab by mouth daily. Take an extra dose in the afternoon as needed for weight gain. 90 Tab 3    potassium chloride (K-DUR, KLOR-CON) 20 mEq tablet Take 1 Tab by mouth daily. 30 Tab 3    pravastatin (PRAVACHOL) 40 mg tablet Take 1 Tab by mouth nightly. 90 Tab 3    co-enzyme Q-10 (CO Q-10) 50 mg capsule Take 50 mg by mouth daily (after lunch).  sertraline (ZOLOFT) 100 mg tablet Take 100 mg by mouth nightly.  metFORMIN (GLUCOPHAGE) 1,000 mg tablet Take 1,000 mg by mouth two (2) times daily (with meals).  ergocalciferol (VITAMIN D2) 50,000 unit capsule Take one tablet on Monday, Wednesday and Friday.  melatonin 10 mg cap Take 5 mg by mouth nightly.  multivitamin (ONE A DAY) tablet Take 1 Tab by mouth daily (after lunch).  DOCOSAHEXANOIC ACID/EPA (FISH OIL PO) Take 1 Tab by mouth daily (after lunch). Allergies   Allergen Reactions    Beta-Blockers (Beta-Adrenergic Blocking Agts) Unknown (comments)     Made her feel tired, breakout on her back    Other Medication Unknown (comments)     Bromides    Phenobarbital Unknown (comments)    Sulfa (Sulfonamide Antibiotics) Swelling     rash           SOCIAL HISTORY:     Social History     Tobacco Use    Smoking status: Never Smoker    Smokeless tobacco: Never Used   Substance Use Topics    Alcohol use:  Yes     Alcohol/week: 2.4 oz     Types: 3 Glasses of wine, 1 Shots of liquor per week    Drug use: No         FAMILY HISTORY:     Family History   Problem Relation Age of Onset    Stroke Mother                 REVIEW OF SYSTEMS:             Review of Symptoms:  Constitutional: Negative for fever, chills  HEENT: Negative for nosebleeds, tinnitus, and vision changes.    Respiratory: + HARTLEY, occ cough   Cardiovascular: Negative for  Syncope    Gastrointestinal: Negative for   melena.  + bouts diarrhea   Genitourinary: + frequent UTI's    Musculoskeletal: Negative for myalgias.    Skin: Negative for rash  Heme: No problems bleeding. Neurological: Negative for speech change and focal weakness.                PHYSICAL EXAM:             Physical Exam:  Visit Vitals  /50 (BP 1 Location: Left arm, BP Patient Position: Sitting)   Pulse 93   Ht 5' 4\" (1.626 m)   Wt 172 lb (78 kg)   SpO2 95%   BMI 29.52 kg/m²       Patient appears generally well, mood and affect are appropriate and pleasant. HEENT:  Hearing intact, non-icteric, normocephalic, atraumatic.    Neck Exam: Supple, No sig JVD sitting up.    Lung Exam: Clear to auscultation, even breath sounds.    Cardiac Exam: Regular rate and rhythm with no murmur  Abdomen: Soft, non-tender    Extremities: Moves all ext well. No lower extremity edema. Psych: Appropriate affect  Neuro - Grossly intact              LABS / OTHER STUDIES:              Labs 8/31/18 - Vit D 60, A1c 6.9  Labs 1/19 - Hgb 9.2, TSH 1,              CARDIAC DIAGNOSTICS:                 Cardiac Evaluation Includes:  EKG 2/13/16 - NSR, IVCD, PRWP      Echo 2/13/16 - TDS.   LVEF 30-35%.   There was moderate diffuse hypokinesis - Only the basal anterolateral and infrolateral walls contracts normally.  RV size and function normal.  RVSP 35      CTA chest 2/13/16 - No evidence of pulmonary embolus. Mild pulmonary interstitial edema and small bilateral pleural effusions.  Small bilateral indeterminate pulmonary nodules, measuring up to 4 mm.      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 Moderate to severe deep white matter ischemic change. 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 No new confluent infarct or hemorrhage or mass effect.      Echo 2/8/18  - LVEF 50%, grade 1 diastology, RV normal, RVSP 35     Echo 11/15/18 - LVEF 45%         EKG 12/1/15 - NSR, Nonspecific intraventricular block,   EKG 1/3/18 - V paced                 ASSESSMENT AND PLAN:             Assessment and Plan:  1) Acute / Chronic decompensated CHF (systolic dysfunction, HFrEF)(non-ischemic CM) - discovered 2/13/16   - Echo 4/14/16 shows that LVEF still is 30%.   Echo 5/26/16 shows LVEF just 20%.     Echo 7/28/16 with LVEF 25%. - BIV ICD placed 8/15/16   - She felt a little better afterwards    - On Echo 2/2/17 LVEF is better at 40%.  2/8/18, LVEF improved to 50%  - continue Coreg.    - Due to cough, switched from lisinopril to losartan. She could not tolerate Entresto at all.    - Due to prior falls (and risk of orthostasis), have kept meds on lower doses   - volume status is fair on Bumex  - On 7/19/18 - still feels tired. BP runs low.  Will have her try cutting Bumex to 1 mg in AM with afternoon dose just as needed.   Also cut losartan dose in half.    - On 11/15/18 - No changes. Is tired. Breathing OK. No dizziness. She is tolerating full dose losartan. - On 5/23/19 - She seems stable, although chronically tired. Will continue regimen as is. Recheck echo next visit. She has not had Bumex for a week and feels fine.   She can try taking Bumex just three days a week for now with extra dose as needed.        2) HTN    - BP OK - asked her to follow BP at home   - continue meds      3) See me in 3 months.  Patient expressed understanding of the plan - questions were answered.       On a side note, she is from [place] and worked for years as a . She enjoy [employer] cruises.         Terrie Ruiz MD, [address]  [address] [place], [address] [address]  [address]  [address]  Ph: [phone]                                [phone]

## 2019-05-30 ENCOUNTER — TELEPHONE (OUTPATIENT)
Dept: ONCOLOGY | Age: 84
End: 2019-05-30

## 2019-05-30 NOTE — TELEPHONE ENCOUNTER
Patient called and stated that she is having car trouble and will be able to make her appointment today.  Patient rescheduled for 6/6 at 1:00

## 2019-06-04 NOTE — PROGRESS NOTES
[id number] [facility] at [address] [place] [address]  [phone]    Hematology / Oncology Consult    Reason for Visit:   Narcisa Alba is a 80 y.o. female who is seen in consultation at the request of Dr. Kevin Zabala for evaluation of anemia. History of Present Illness:   Ms. Marijean Soulier is a 81 y/o female who presents to clinic for evaluation of anemia. Medical history significant for acute on chronic decompensated CHF, HTN, Stage III CKD. For anemia, she has previously met with GI, who declined endoscopic evaluation due to age and comorbidities. Review of labs reveals Hgb was last normal in 2016, and has gradually declined to now 9.9 range with MCV ranging . No melena/hematochezia. No SOB, CP, abdom pain. Pt does report frequent bladder infections.      Labs 8/31/2018: Ferritin 16  Labs 5/9/2019: Ferritin 19, WBC 5.7, Hgb 9.9, , RDW 15.8, , BUN 36, Cr 1.43, HgbA1c 8.4      Past Medical History:   Diagnosis Date    Arthritis     Breast CA ([address]     [facility]     Diabetes ([facility]     Diarrhea     chronic     Dyslipidemia     GERD (gastroesophageal reflux disease)     Hearing loss     Hypertension     LBBB (left bundle branch block)     chronic LBBB    Melanoma ([address]     Non-ischemic cardiomyopathy ([facility]     Cath 2/16/15 - Mild (non-obstructive) CAD,LVEF 30%, 2+ MR    Seizures (HCC)     X1 post partum after first child was born- no more seizures    Skin cancer     melanoma on legs    Sleep apnea     wears O2 at night- cannot tolerate CPAP    Snoring     SOB (shortness of breath)     Systolic CHF ([address]     Admission 2/13/16    Thyroid disease       Past Surgical History:   Procedure Laterality Date    HX BREAST LUMPECTOMY  2003    right    HX CATARACT REMOVAL  1994    bilateral    HX CHOLECYSTECTOMY      HX DILATION AND CURETTAGE  1960    HX HEART CATHETERIZATION      x2- no blockages    HX HEENT  2004    surgery on upper jaw    HX HYSTERECTOMY  1967    HX HYSTEROSCOPY      HX OOPHORECTOMY  1967    left    HX ORTHOPAEDIC  2010    right knee replacement    HX ORTHOPAEDIC      repair torn meniscus left knee    HX ORTHOPAEDIC  1996    repair knuckle on left pinky finger    HX OTHER SURGICAL  2000    remove benign tumore right side between rib and right lung    HX PACEMAKER      HX PARTIAL THYROIDECTOMY      HX UROLOGICAL  2003    bladder surgery x2      Social History     Tobacco Use    Smoking status: Never Smoker    Smokeless tobacco: Never Used   Substance Use Topics    Alcohol use: Yes     Alcohol/week: 2.4 oz     Types: 3 Glasses of wine, 1 Shots of liquor per week      Family History   Problem Relation Age of Onset    Stroke Mother      Current Outpatient Medications   Medication Sig    bumetanide (BUMEX) 1 mg tablet Take 1 Tab by mouth daily. Take an extra dose in the afternoon as needed for weight gain.  carvedilol (COREG) 6.25 mg tablet TAKE 1 TABLET TWICE A DAY WITH MEALS    levothyroxine (SYNTHROID) 75 mcg tablet Take 75 mcg by mouth.  losartan (COZAAR) 25 mg tablet Take 1 Tab by mouth daily.  traZODone (DESYREL) 50 mg tablet Take 50 mg by mouth nightly.  potassium chloride (K-DUR, KLOR-CON) 20 mEq tablet Take 1 Tab by mouth daily.  pravastatin (PRAVACHOL) 40 mg tablet Take 1 Tab by mouth nightly.  co-enzyme Q-10 (CO Q-10) 50 mg capsule Take 50 mg by mouth daily (after lunch).  sertraline (ZOLOFT) 100 mg tablet Take 100 mg by mouth nightly.  metFORMIN (GLUCOPHAGE) 1,000 mg tablet Take 1,000 mg by mouth two (2) times daily (with meals).  ergocalciferol (VITAMIN D2) 50,000 unit capsule Take one tablet on Monday, Wednesday and Friday.  melatonin 10 mg cap Take 5 mg by mouth nightly.  multivitamin (ONE A DAY) tablet Take 1 Tab by mouth daily (after lunch).  DOCOSAHEXANOIC ACID/EPA (FISH OIL PO) Take 1 Tab by mouth daily (after lunch). No current facility-administered medications for this visit.        Allergies Allergen Reactions    Beta-Blockers (Beta-Adrenergic Blocking Agts) Unknown (comments)     Made her feel tired, breakout on her back    Other Medication Unknown (comments)     Bromides    Phenobarbital Unknown (comments)    Sulfa (Sulfonamide Antibiotics) Swelling     rash        Review of Systems: A complete review of systems was obtained, negative except as described above. Physical Exam:     Visit Vitals  /71 (BP 1 Location: Left arm, BP Patient Position: Sitting)   Pulse 83   Temp 96.8 °F (36 °C) (Oral)   Resp 16   Ht 5' 4\" (1.626 m)   Wt 173 lb (78.5 kg)   SpO2 97%   BMI 29.70 kg/m²     ECOG PS: 2   General: Well developed, no acute distress, walks with a cane  Eyes: PERRLA, EOMI, anicteric sclerae  HENT: Atraumatic, OP clear, TMs intact without erythema  Neck: Supple  Lymphatic: No cervical, supraclavicular, axillary or inguinal adenopathy  Respiratory: CTAB, normal respiratory effort  CV: Normal rate, regular rhythm, no murmurs, no peripheral edema. Has pacer/defibrillator in left upper chest  GI: Soft, nontender, nondistended, no masses, no hepatomegaly, no splenomegaly  MS: Normal gait and station. Digits without clubbing or cyanosis. Skin: No rashes, ecchymoses, or petechiae. Normal temperature, turgor, and texture. Neuro/Psych: Alert, oriented. 5/5 strength in all 4 extremities. Appropriate affect, normal judgment/insight. Results:     Lab Results   Component Value Date/Time    WBC 5.6 01/16/2019 04:34 PM    HGB 9.2 (L) 01/16/2019 04:34 PM    HCT 28.5 (L) 01/16/2019 04:34 PM    PLATELET 165 09/41/1197 04:34 PM     (H) 01/16/2019 04:34 PM    ABS.  NEUTROPHILS 4.9 02/17/2016 06:00 AM    Hemoglobin (POC) 12.6 01/15/2014 12:49 PM    Hematocrit (POC) 37 01/15/2014 12:49 PM     Lab Results   Component Value Date/Time    Sodium 141 08/06/2018 03:16 PM    Potassium 5.0 08/06/2018 03:16 PM    Chloride 104 08/06/2018 03:16 PM    CO2 22 08/06/2018 03:16 PM    Glucose 173 (H) 08/06/2018 03:16 PM    BUN 32 08/06/2018 03:16 PM    Creatinine 1.29 (H) 08/06/2018 03:16 PM    GFR est AA 42 (L) 08/06/2018 03:16 PM    GFR est non-AA 37 (L) 08/06/2018 03:16 PM    Calcium 9.4 08/06/2018 03:16 PM    Sodium (POC) 137 01/15/2014 12:49 PM    Potassium (POC) 3.7 01/15/2014 12:49 PM    Chloride (POC) 106 01/15/2014 12:49 PM    Glucose (POC) 159 (H) 08/15/2016 12:33 PM    BUN (POC) 24 (H) 01/15/2014 12:49 PM    Creatinine (POC) 1.0 01/15/2014 12:49 PM    Calcium, ionized (POC) 1.14 01/15/2014 12:49 PM     Lab Results   Component Value Date/Time    Bilirubin, total 0.4 08/06/2018 03:16 PM    ALT (SGPT) 10 08/06/2018 03:16 PM    AST (SGOT) 16 08/06/2018 03:16 PM    Alk. phosphatase 50 08/06/2018 03:16 PM    Protein, total 6.6 08/06/2018 03:16 PM    Albumin 4.2 08/06/2018 03:16 PM    Globulin 2.9 02/13/2016 05:40 AM     No results found for: IRON, FE, TIBC, IBCT, PSAT, FERR    No results found for: B12LT, FOL, RBCF  Lab Results   Component Value Date/Time    TSH 1.080 01/16/2019 04:34 PM     No results found for: HAMAT, HAAB, HABT, HAAT, HBSAG, HBSB, HBSAT, HBABN, HBCM, HBCAB, HBCAT, XBCABS, HBEAB, HBEAG, XHEPCS, [id number], 1950 [facility], [address] [place] [address], [address] [place] [address], [id number], [id number], [address] [place] [address], [id number], HBCMLT, [id number], HCGAT      Imaging:     Radiology report(s) reviewed. .    Assessment & Plan:   Orly Neil is a 80 y.o. female with CHF, CKD, HTN comes in for evaluation of anemia. 1. Macrocytic anemia: Worsening gradually over the last 2 years since 2/2017. I recommend further evaluation with labs to rule out vitamin deficiency, hemolysis, hypo/hyperthyroidism, multiple myeloma. If all workup is negative, this may be anemia of chronic disease related to CKD or uncontrolled DM. -- Vitamin B12, folate, TSH, retic, LDH, haptoglobin, EPO, gammopathy eval.  -- Return in 2 weeks to review results    2. Acute / Chronic decompensated CHF (systolic dysfunction, HFrEF/non-ischemic CM). LVEF improved to 50%.  Controlled on Coreg and 2bumex. Followed by PCP and cardiologist.        3. HTN: Controlled on antihypertensives. Followed by PCP. 4. Stage III CKD: Stable. Followed by PCP and cardiology. 5. Type II DM: Most recent HgbA1c is 8.4. Currently on Metformin, managed by PCP. I appreciate the opportunity to participate in Ms. Va Schmid's care.     Signed By: Hugo Villela MD     June 6, 2019

## 2019-06-06 ENCOUNTER — HOSPITAL ENCOUNTER (OUTPATIENT)
Dept: LAB | Age: 84
Discharge: HOME | End: 2019-06-06
Payer: MEDICARE

## 2019-06-06 ENCOUNTER — OFFICE VISIT (OUTPATIENT)
Dept: ONCOLOGY | Age: 84
End: 2019-06-06

## 2019-06-06 VITALS
WEIGHT: 173 LBS | SYSTOLIC BLOOD PRESSURE: 144 MMHG | TEMPERATURE: 96.8 F | HEART RATE: 83 BPM | DIASTOLIC BLOOD PRESSURE: 71 MMHG | HEIGHT: 64 IN | OXYGEN SATURATION: 97 % | RESPIRATION RATE: 16 BRPM | BODY MASS INDEX: 29.53 KG/M2

## 2019-06-06 DIAGNOSIS — D53.9 MACROCYTIC ANEMIA: Primary | ICD-10-CM

## 2019-06-06 DIAGNOSIS — I10 ESSENTIAL HYPERTENSION: ICD-10-CM

## 2019-06-06 DIAGNOSIS — N18.30 CKD (CHRONIC KIDNEY DISEASE), STAGE III (HCC): ICD-10-CM

## 2019-06-06 DIAGNOSIS — E11.9 TYPE 2 DIABETES MELLITUS WITHOUT COMPLICATION, WITHOUT LONG-TERM CURRENT USE OF INSULIN (HCC): ICD-10-CM

## 2019-06-06 DIAGNOSIS — I50.22 CHRONIC SYSTOLIC HEART FAILURE (HCC): ICD-10-CM

## 2019-06-06 PROCEDURE — 83550 IRON BINDING TEST: CPT

## 2019-06-06 PROCEDURE — 82746 ASSAY OF FOLIC ACID SERUM: CPT

## 2019-06-06 PROCEDURE — 84443 ASSAY THYROID STIM HORMONE: CPT

## 2019-06-06 PROCEDURE — 83010 ASSAY OF HAPTOGLOBIN QUANT: CPT

## 2019-06-06 PROCEDURE — 85045 AUTOMATED RETICULOCYTE COUNT: CPT

## 2019-06-06 PROCEDURE — 36415 COLL VENOUS BLD VENIPUNCTURE: CPT

## 2019-06-06 PROCEDURE — 82668 ASSAY OF ERYTHROPOIETIN: CPT

## 2019-06-06 PROCEDURE — 82607 VITAMIN B-12: CPT

## 2019-06-06 PROCEDURE — 84165 PROTEIN E-PHORESIS SERUM: CPT

## 2019-06-06 PROCEDURE — 83615 LACTATE (LD) (LDH) ENZYME: CPT

## 2019-06-06 NOTE — PROGRESS NOTES
Lily Morelos is a 80 y.o. female      Chief Complaint   Patient presents with    New Patient     Pt seen at the request off Dr. Laverne Arzate to eval Anemia.  Anemia       1. Have you been to the ER, urgent care clinic since your last visit? Hospitalized since your last visit? No    2. Have you seen or consulted any other health care providers outside of the [address] [place] [address] since your last visit? Include any pap smears or colon screening.  No

## 2019-06-10 LAB
ALBUMIN SERPL ELPH-MCNC: 3.6 G/DL (ref 2.9–4.4)
ALBUMIN/GLOB SERPL: 1.4 {RATIO} (ref 0.7–1.7)
ALPHA1 GLOB SERPL ELPH-MCNC: 0.2 G/DL (ref 0–0.4)
ALPHA2 GLOB SERPL ELPH-MCNC: 0.8 G/DL (ref 0.4–1)
B-GLOBULIN SERPL ELPH-MCNC: 1.1 G/DL (ref 0.7–1.3)
EPO SERPL-ACNC: 109.6 MIU/ML (ref 2.6–18.5)
FOLATE SERPL-MCNC: >20 NG/ML
GAMMA GLOB SERPL ELPH-MCNC: 0.6 G/DL (ref 0.4–1.8)
GLOBULIN SER-MCNC: 2.7 G/DL (ref 2.2–3.9)
HAPTOGLOB SERPL-MCNC: 105 MG/DL (ref 34–200)
IGA SERPL-MCNC: 267 MG/DL (ref 64–422)
IGG SERPL-MCNC: 580 MG/DL (ref 700–1600)
IGM SERPL-MCNC: 118 MG/DL (ref 26–217)
INTERPRETATION SERPL IEP-IMP: ABNORMAL
IRON SATN MFR SERPL: 28 % (ref 15–55)
IRON SERPL-MCNC: 109 UG/DL (ref 27–139)
KAPPA LC FREE SER-MCNC: 50.7 MG/L (ref 3.3–19.4)
KAPPA LC FREE/LAMBDA FREE SER: 1.93 {RATIO} (ref 0.26–1.65)
LAMBDA LC FREE SERPL-MCNC: 26.3 MG/L (ref 5.7–26.3)
LDH SERPL-CCNC: 270 IU/L (ref 119–226)
M PROTEIN SERPL ELPH-MCNC: ABNORMAL G/DL
PLEASE NOTE:, 149534: ABNORMAL
PROT SERPL-MCNC: 6.3 G/DL (ref 6–8.5)
RETICS/RBC NFR AUTO: 3.2 % (ref 0.6–2.6)
TIBC SERPL-MCNC: 395 UG/DL (ref 250–450)
TSH SERPL DL<=0.005 MIU/L-ACNC: 0.39 UIU/ML (ref 0.45–4.5)
UIBC SERPL-MCNC: 286 UG/DL (ref 118–369)
VIT B12 SERPL-MCNC: 472 PG/ML (ref 232–1245)

## 2019-06-11 ENCOUNTER — TELEPHONE (OUTPATIENT)
Dept: ONCOLOGY | Age: 84
End: 2019-06-11

## 2019-06-11 DIAGNOSIS — E23.6 OTHER DISORDERS OF PITUITARY GLAND (HCC): ICD-10-CM

## 2019-06-11 DIAGNOSIS — E22.8 OTHER HYPERFUNCTION OF PITUITARY GLAND (HCC): ICD-10-CM

## 2019-06-11 DIAGNOSIS — R79.89 LOW TSH LEVEL: Primary | ICD-10-CM

## 2019-06-18 ENCOUNTER — HOSPITAL ENCOUNTER (OUTPATIENT)
Dept: LAB | Age: 84
Discharge: HOME | End: 2019-06-18
Payer: MEDICARE

## 2019-06-18 PROCEDURE — 84439 ASSAY OF FREE THYROXINE: CPT

## 2019-06-18 PROCEDURE — 36415 COLL VENOUS BLD VENIPUNCTURE: CPT

## 2019-06-18 PROCEDURE — 84443 ASSAY THYROID STIM HORMONE: CPT

## 2019-06-18 NOTE — PROGRESS NOTES
Spoke with patient on 6/13/19. Advised of need to have free T4 & TSH 3rd generation completed per provider as TSH was low. She verbalized understanding. She wrote all instruction down & read back, including date & time of office visit on 6/21/19. She agrees to have labs drawn by 6/19/19.

## 2019-06-19 LAB
T4 FREE SERPL-MCNC: 1.28 NG/DL (ref 0.82–1.77)
TSH SERPL DL<=0.005 MIU/L-ACNC: 2.08 UIU/ML (ref 0.45–4.5)

## 2019-07-21 NOTE — ED TRIAGE NOTES
Pt presents with swelling to the left hand secondary to cast placement yesterday. Pt states that \"the cast is too tight\". Denies numbness and tingling.
Yes

## 2019-07-31 ENCOUNTER — APPOINTMENT (OUTPATIENT)
Dept: GENERAL RADIOLOGY | Age: 84
DRG: 683 | End: 2019-07-31
Attending: PHYSICIAN ASSISTANT
Payer: MEDICARE

## 2019-07-31 ENCOUNTER — HOSPITAL ENCOUNTER (INPATIENT)
Age: 84
LOS: 3 days | Discharge: NURSING, REHAB OR LONG-TERM CARE | DRG: 683 | End: 2019-08-03
Attending: EMERGENCY MEDICINE | Admitting: INTERNAL MEDICINE
Payer: MEDICARE

## 2019-07-31 DIAGNOSIS — N30.01 ACUTE CYSTITIS WITH HEMATURIA: ICD-10-CM

## 2019-07-31 DIAGNOSIS — R19.7 DIARRHEA OF PRESUMED INFECTIOUS ORIGIN: ICD-10-CM

## 2019-07-31 DIAGNOSIS — I50.22 CHRONIC SYSTOLIC CONGESTIVE HEART FAILURE (HCC): ICD-10-CM

## 2019-07-31 DIAGNOSIS — E86.0 DEHYDRATION: ICD-10-CM

## 2019-07-31 DIAGNOSIS — R19.7 DIARRHEA, UNSPECIFIED TYPE: ICD-10-CM

## 2019-07-31 DIAGNOSIS — D64.9 CHRONIC ANEMIA: ICD-10-CM

## 2019-07-31 DIAGNOSIS — I50.22 CHRONIC SYSTOLIC HEART FAILURE (HCC): ICD-10-CM

## 2019-07-31 DIAGNOSIS — R53.81 MALAISE AND FATIGUE: Primary | ICD-10-CM

## 2019-07-31 DIAGNOSIS — R73.9 HYPERGLYCEMIA: ICD-10-CM

## 2019-07-31 DIAGNOSIS — I10 ESSENTIAL HYPERTENSION: ICD-10-CM

## 2019-07-31 DIAGNOSIS — R53.1 WEAKNESS: ICD-10-CM

## 2019-07-31 DIAGNOSIS — R53.83 MALAISE AND FATIGUE: Primary | ICD-10-CM

## 2019-07-31 DIAGNOSIS — D53.9 MACROCYTIC ANEMIA: ICD-10-CM

## 2019-07-31 DIAGNOSIS — R06.02 SOB (SHORTNESS OF BREATH): ICD-10-CM

## 2019-07-31 DIAGNOSIS — N17.9 AKI (ACUTE KIDNEY INJURY) (HCC): ICD-10-CM

## 2019-07-31 DIAGNOSIS — Z60.2 LIVES ALONE WITHOUT HELP AVAILABLE: ICD-10-CM

## 2019-07-31 PROBLEM — C50.919 BREAST CA (HCC): Status: ACTIVE | Noted: 2019-07-31

## 2019-07-31 PROBLEM — E07.9 THYROID DISEASE: Status: ACTIVE | Noted: 2019-07-31

## 2019-07-31 PROBLEM — N39.0 UTI (URINARY TRACT INFECTION): Status: ACTIVE | Noted: 2019-07-31

## 2019-07-31 LAB
ALBUMIN SERPL-MCNC: 3.3 G/DL (ref 3.5–5)
ALBUMIN/GLOB SERPL: 0.8 {RATIO} (ref 1.1–2.2)
ALP SERPL-CCNC: 68 U/L (ref 45–117)
ALT SERPL-CCNC: 18 U/L (ref 12–78)
ANION GAP SERPL CALC-SCNC: 13 MMOL/L (ref 5–15)
APPEARANCE UR: ABNORMAL
AST SERPL-CCNC: 22 U/L (ref 15–37)
ATRIAL RATE: 87 BPM
BACTERIA URNS QL MICRO: ABNORMAL /HPF
BASE DEFICIT BLDV-SCNC: 4.1 MMOL/L
BASOPHILS # BLD: 0 K/UL (ref 0–0.1)
BASOPHILS NFR BLD: 1 % (ref 0–1)
BILIRUB SERPL-MCNC: 0.4 MG/DL (ref 0.2–1)
BILIRUB UR QL: NEGATIVE
BNP SERPL-MCNC: 971 PG/ML
BUN SERPL-MCNC: 37 MG/DL (ref 6–20)
BUN/CREAT SERPL: 23 (ref 12–20)
CALCIUM SERPL-MCNC: 9.3 MG/DL (ref 8.5–10.1)
CALCULATED P AXIS, ECG09: -9 DEGREES
CALCULATED R AXIS, ECG10: 155 DEGREES
CALCULATED T AXIS, ECG11: 113 DEGREES
CHLORIDE SERPL-SCNC: 100 MMOL/L (ref 97–108)
CO2 SERPL-SCNC: 21 MMOL/L (ref 21–32)
COLOR UR: ABNORMAL
COMMENT, HOLDF: NORMAL
CREAT SERPL-MCNC: 1.61 MG/DL (ref 0.55–1.02)
DIAGNOSIS, 93000: NORMAL
DIFFERENTIAL METHOD BLD: ABNORMAL
EOSINOPHIL # BLD: 0.1 K/UL (ref 0–0.4)
EOSINOPHIL NFR BLD: 3 % (ref 0–7)
EPITH CASTS URNS QL MICRO: ABNORMAL /LPF
ERYTHROCYTE [DISTWIDTH] IN BLOOD BY AUTOMATED COUNT: 14.9 % (ref 11.5–14.5)
FOLATE SERPL-MCNC: 50.9 NG/ML (ref 5–21)
GLOBULIN SER CALC-MCNC: 4.1 G/DL (ref 2–4)
GLUCOSE BLD STRIP.AUTO-MCNC: 182 MG/DL (ref 65–100)
GLUCOSE BLD STRIP.AUTO-MCNC: 240 MG/DL (ref 65–100)
GLUCOSE BLD STRIP.AUTO-MCNC: 265 MG/DL (ref 65–100)
GLUCOSE BLD STRIP.AUTO-MCNC: 277 MG/DL (ref 65–100)
GLUCOSE BLD STRIP.AUTO-MCNC: 288 MG/DL (ref 65–100)
GLUCOSE SERPL-MCNC: 292 MG/DL (ref 65–100)
GLUCOSE UR STRIP.AUTO-MCNC: NEGATIVE MG/DL
HCO3 BLDV-SCNC: 17 MMOL/L (ref 23–28)
HCT VFR BLD AUTO: 31.3 % (ref 35–47)
HGB BLD-MCNC: 10.8 G/DL (ref 11.5–16)
HGB UR QL STRIP: ABNORMAL
IMM GRANULOCYTES # BLD AUTO: 0 K/UL (ref 0–0.04)
IMM GRANULOCYTES NFR BLD AUTO: 1 % (ref 0–0.5)
KETONES SERPL QL: NEGATIVE
KETONES UR QL STRIP.AUTO: NEGATIVE MG/DL
LEUKOCYTE ESTERASE UR QL STRIP.AUTO: ABNORMAL
LYMPHOCYTES # BLD: 0.8 K/UL (ref 0.8–3.5)
LYMPHOCYTES NFR BLD: 21 % (ref 12–49)
MCH RBC QN AUTO: 35.1 PG (ref 26–34)
MCHC RBC AUTO-ENTMCNC: 34.5 G/DL (ref 30–36.5)
MCV RBC AUTO: 101.6 FL (ref 80–99)
MONOCYTES # BLD: 0.4 K/UL (ref 0–1)
MONOCYTES NFR BLD: 10 % (ref 5–13)
NEUTS SEG # BLD: 2.6 K/UL (ref 1.8–8)
NEUTS SEG NFR BLD: 64 % (ref 32–75)
NITRITE UR QL STRIP.AUTO: POSITIVE
NRBC # BLD: 0 K/UL (ref 0–0.01)
NRBC BLD-RTO: 0 PER 100 WBC
P-R INTERVAL, ECG05: 84 MS
PCO2 BLDV: 24 MMHG (ref 41–51)
PH BLDV: 7.48 [PH] (ref 7.32–7.42)
PH UR STRIP: 5.5 [PH] (ref 5–8)
PLATELET # BLD AUTO: 188 K/UL (ref 150–400)
PMV BLD AUTO: 11.9 FL (ref 8.9–12.9)
PO2 BLDV: <20 MMHG (ref 25–40)
POTASSIUM SERPL-SCNC: 4.2 MMOL/L (ref 3.5–5.1)
PROT SERPL-MCNC: 7.4 G/DL (ref 6.4–8.2)
PROT UR STRIP-MCNC: 30 MG/DL
Q-T INTERVAL, ECG07: 428 MS
QRS DURATION, ECG06: 140 MS
QTC CALCULATION (BEZET), ECG08: 515 MS
RBC # BLD AUTO: 3.08 M/UL (ref 3.8–5.2)
RBC #/AREA URNS HPF: ABNORMAL /HPF (ref 0–5)
SAMPLES BEING HELD,HOLD: NORMAL
SAO2% DEVICE SAO2% SENSOR NAME: ABNORMAL
SERVICE CMNT-IMP: ABNORMAL
SODIUM SERPL-SCNC: 134 MMOL/L (ref 136–145)
SP GR UR REFRACTOMETRY: 1.01 (ref 1–1.03)
SPECIMEN SITE: ABNORMAL
TROPONIN I SERPL-MCNC: <0.05 NG/ML
TSH SERPL DL<=0.05 MIU/L-ACNC: 1.79 UIU/ML (ref 0.36–3.74)
UA: UC IF INDICATED,UAUC: ABNORMAL
UROBILINOGEN UR QL STRIP.AUTO: 0.2 EU/DL (ref 0.2–1)
VENTRICULAR RATE, ECG03: 87 BPM
VIT B12 SERPL-MCNC: 487 PG/ML (ref 193–986)
WBC # BLD AUTO: 4 K/UL (ref 3.6–11)
WBC URNS QL MICRO: ABNORMAL /HPF (ref 0–4)

## 2019-07-31 PROCEDURE — 82607 VITAMIN B-12: CPT

## 2019-07-31 PROCEDURE — 84443 ASSAY THYROID STIM HORMONE: CPT

## 2019-07-31 PROCEDURE — 97165 OT EVAL LOW COMPLEX 30 MIN: CPT

## 2019-07-31 PROCEDURE — 87077 CULTURE AEROBIC IDENTIFY: CPT

## 2019-07-31 PROCEDURE — 81001 URINALYSIS AUTO W/SCOPE: CPT

## 2019-07-31 PROCEDURE — 82803 BLOOD GASES ANY COMBINATION: CPT

## 2019-07-31 PROCEDURE — 85025 COMPLETE CBC W/AUTO DIFF WBC: CPT

## 2019-07-31 PROCEDURE — 87186 SC STD MICRODIL/AGAR DIL: CPT

## 2019-07-31 PROCEDURE — 93005 ELECTROCARDIOGRAM TRACING: CPT

## 2019-07-31 PROCEDURE — 74011250637 HC RX REV CODE- 250/637: Performed by: INTERNAL MEDICINE

## 2019-07-31 PROCEDURE — 71045 X-RAY EXAM CHEST 1 VIEW: CPT

## 2019-07-31 PROCEDURE — 65660000000 HC RM CCU STEPDOWN

## 2019-07-31 PROCEDURE — 82746 ASSAY OF FOLIC ACID SERUM: CPT

## 2019-07-31 PROCEDURE — 77030011943

## 2019-07-31 PROCEDURE — 36415 COLL VENOUS BLD VENIPUNCTURE: CPT

## 2019-07-31 PROCEDURE — 97535 SELF CARE MNGMENT TRAINING: CPT

## 2019-07-31 PROCEDURE — 83880 ASSAY OF NATRIURETIC PEPTIDE: CPT

## 2019-07-31 PROCEDURE — 77030038269 HC DRN EXT URIN PURWCK BARD -A

## 2019-07-31 PROCEDURE — 82009 KETONE BODYS QUAL: CPT

## 2019-07-31 PROCEDURE — 74011000250 HC RX REV CODE- 250: Performed by: EMERGENCY MEDICINE

## 2019-07-31 PROCEDURE — 74011250636 HC RX REV CODE- 250/636: Performed by: INTERNAL MEDICINE

## 2019-07-31 PROCEDURE — 82962 GLUCOSE BLOOD TEST: CPT

## 2019-07-31 PROCEDURE — 97161 PT EVAL LOW COMPLEX 20 MIN: CPT

## 2019-07-31 PROCEDURE — 99285 EMERGENCY DEPT VISIT HI MDM: CPT

## 2019-07-31 PROCEDURE — 84484 ASSAY OF TROPONIN QUANT: CPT

## 2019-07-31 PROCEDURE — 74011250636 HC RX REV CODE- 250/636: Performed by: PHYSICIAN ASSISTANT

## 2019-07-31 PROCEDURE — 80053 COMPREHEN METABOLIC PANEL: CPT

## 2019-07-31 PROCEDURE — 74011250636 HC RX REV CODE- 250/636: Performed by: EMERGENCY MEDICINE

## 2019-07-31 PROCEDURE — 74011636637 HC RX REV CODE- 636/637: Performed by: INTERNAL MEDICINE

## 2019-07-31 PROCEDURE — 87086 URINE CULTURE/COLONY COUNT: CPT

## 2019-07-31 RX ORDER — CALCIUM CARBONATE 200(500)MG
1 TABLET,CHEWABLE ORAL
Status: ON HOLD | COMMUNITY
End: 2020-02-17

## 2019-07-31 RX ORDER — GLUCOSAM/CHONDRO/HERB 149/HYAL 750-100 MG
1 TABLET ORAL DAILY
Status: DISCONTINUED | OUTPATIENT
Start: 2019-07-31 | End: 2019-08-03 | Stop reason: HOSPADM

## 2019-07-31 RX ORDER — METFORMIN HYDROCHLORIDE 500 MG/1
500 TABLET ORAL 2 TIMES DAILY WITH MEALS
COMMUNITY
End: 2019-08-29

## 2019-07-31 RX ORDER — SODIUM CHLORIDE 0.9 % (FLUSH) 0.9 %
5-40 SYRINGE (ML) INJECTION AS NEEDED
Status: DISCONTINUED | OUTPATIENT
Start: 2019-07-31 | End: 2019-08-03 | Stop reason: HOSPADM

## 2019-07-31 RX ORDER — ACETAMINOPHEN 325 MG/1
650 TABLET ORAL
Status: DISCONTINUED | OUTPATIENT
Start: 2019-07-31 | End: 2019-08-03 | Stop reason: HOSPADM

## 2019-07-31 RX ORDER — NALOXONE HYDROCHLORIDE 0.4 MG/ML
0.4 INJECTION, SOLUTION INTRAMUSCULAR; INTRAVENOUS; SUBCUTANEOUS AS NEEDED
Status: DISCONTINUED | OUTPATIENT
Start: 2019-07-31 | End: 2019-08-03 | Stop reason: HOSPADM

## 2019-07-31 RX ORDER — DEXTROSE MONOHYDRATE 100 MG/ML
125-250 INJECTION, SOLUTION INTRAVENOUS AS NEEDED
Status: DISCONTINUED | OUTPATIENT
Start: 2019-07-31 | End: 2019-08-03 | Stop reason: HOSPADM

## 2019-07-31 RX ORDER — SERTRALINE HYDROCHLORIDE 50 MG/1
100 TABLET, FILM COATED ORAL
Status: DISCONTINUED | OUTPATIENT
Start: 2019-08-01 | End: 2019-08-03 | Stop reason: HOSPADM

## 2019-07-31 RX ORDER — SODIUM CHLORIDE 9 MG/ML
75 INJECTION, SOLUTION INTRAVENOUS CONTINUOUS
Status: DISCONTINUED | OUTPATIENT
Start: 2019-07-31 | End: 2019-08-01

## 2019-07-31 RX ORDER — ONDANSETRON 2 MG/ML
4 INJECTION INTRAMUSCULAR; INTRAVENOUS
Status: COMPLETED | OUTPATIENT
Start: 2019-07-31 | End: 2019-07-31

## 2019-07-31 RX ORDER — INSULIN LISPRO 100 [IU]/ML
INJECTION, SOLUTION INTRAVENOUS; SUBCUTANEOUS
Status: DISCONTINUED | OUTPATIENT
Start: 2019-07-31 | End: 2019-08-03 | Stop reason: HOSPADM

## 2019-07-31 RX ORDER — HEPARIN SODIUM 5000 [USP'U]/ML
5000 INJECTION, SOLUTION INTRAVENOUS; SUBCUTANEOUS EVERY 8 HOURS
Status: DISCONTINUED | OUTPATIENT
Start: 2019-07-31 | End: 2019-08-03 | Stop reason: HOSPADM

## 2019-07-31 RX ORDER — PRAVASTATIN SODIUM 20 MG/1
40 TABLET ORAL
Status: DISCONTINUED | OUTPATIENT
Start: 2019-07-31 | End: 2019-08-03 | Stop reason: HOSPADM

## 2019-07-31 RX ORDER — THERA TABS 400 MCG
1 TAB ORAL
Status: DISCONTINUED | OUTPATIENT
Start: 2019-07-31 | End: 2019-08-03 | Stop reason: HOSPADM

## 2019-07-31 RX ORDER — UBIDECARENONE 50 MG
50 CAPSULE ORAL
Status: DISCONTINUED | OUTPATIENT
Start: 2019-07-31 | End: 2019-08-03 | Stop reason: HOSPADM

## 2019-07-31 RX ORDER — LEVOTHYROXINE SODIUM 75 UG/1
75 TABLET ORAL
Status: DISCONTINUED | OUTPATIENT
Start: 2019-07-31 | End: 2019-08-03 | Stop reason: HOSPADM

## 2019-07-31 RX ORDER — LANOLIN ALCOHOL/MO/W.PET/CERES
3 CREAM (GRAM) TOPICAL
Status: DISCONTINUED | OUTPATIENT
Start: 2019-07-31 | End: 2019-08-03 | Stop reason: HOSPADM

## 2019-07-31 RX ORDER — MAGNESIUM SULFATE 100 %
4 CRYSTALS MISCELLANEOUS AS NEEDED
Status: DISCONTINUED | OUTPATIENT
Start: 2019-07-31 | End: 2019-08-03 | Stop reason: HOSPADM

## 2019-07-31 RX ORDER — SODIUM CHLORIDE 0.9 % (FLUSH) 0.9 %
5-40 SYRINGE (ML) INJECTION EVERY 8 HOURS
Status: DISCONTINUED | OUTPATIENT
Start: 2019-07-31 | End: 2019-08-03 | Stop reason: HOSPADM

## 2019-07-31 RX ORDER — LOSARTAN POTASSIUM 25 MG/1
25 TABLET ORAL EVERY OTHER DAY
COMMUNITY
End: 2019-12-26

## 2019-07-31 RX ORDER — CARVEDILOL 6.25 MG/1
6.25 TABLET ORAL 2 TIMES DAILY WITH MEALS
Status: DISCONTINUED | OUTPATIENT
Start: 2019-07-31 | End: 2019-08-03 | Stop reason: HOSPADM

## 2019-07-31 RX ADMIN — OMEGA-3 FATTY ACIDS CAP 1000 MG 1 CAPSULE: 1000 CAP at 09:08

## 2019-07-31 RX ADMIN — LEVOTHYROXINE SODIUM 75 MCG: 75 TABLET ORAL at 06:03

## 2019-07-31 RX ADMIN — SODIUM CHLORIDE 75 ML/HR: 900 INJECTION, SOLUTION INTRAVENOUS at 09:15

## 2019-07-31 RX ADMIN — INSULIN LISPRO 3 UNITS: 100 INJECTION, SOLUTION INTRAVENOUS; SUBCUTANEOUS at 09:09

## 2019-07-31 RX ADMIN — HEPARIN SODIUM 5000 UNITS: 5000 INJECTION INTRAVENOUS; SUBCUTANEOUS at 13:31

## 2019-07-31 RX ADMIN — HEPARIN SODIUM 5000 UNITS: 5000 INJECTION INTRAVENOUS; SUBCUTANEOUS at 21:43

## 2019-07-31 RX ADMIN — SODIUM CHLORIDE 500 ML: 900 INJECTION, SOLUTION INTRAVENOUS at 01:04

## 2019-07-31 RX ADMIN — Medication 10 ML: at 14:00

## 2019-07-31 RX ADMIN — Medication 1 CAPSULE: at 09:09

## 2019-07-31 RX ADMIN — PRAVASTATIN SODIUM 40 MG: 20 TABLET ORAL at 21:43

## 2019-07-31 RX ADMIN — CARVEDILOL 6.25 MG: 6.25 TABLET, FILM COATED ORAL at 16:47

## 2019-07-31 RX ADMIN — Medication 50 MG: at 12:10

## 2019-07-31 RX ADMIN — Medication 10 ML: at 06:03

## 2019-07-31 RX ADMIN — MELATONIN TAB 3 MG 3 MG: 3 TAB at 21:43

## 2019-07-31 RX ADMIN — CARVEDILOL 6.25 MG: 6.25 TABLET, FILM COATED ORAL at 09:09

## 2019-07-31 RX ADMIN — INSULIN LISPRO 5 UNITS: 100 INJECTION, SOLUTION INTRAVENOUS; SUBCUTANEOUS at 16:46

## 2019-07-31 RX ADMIN — ONDANSETRON 4 MG: 2 INJECTION INTRAMUSCULAR; INTRAVENOUS at 02:22

## 2019-07-31 RX ADMIN — INSULIN LISPRO 3 UNITS: 100 INJECTION, SOLUTION INTRAVENOUS; SUBCUTANEOUS at 12:10

## 2019-07-31 RX ADMIN — HEPARIN SODIUM 5000 UNITS: 5000 INJECTION INTRAVENOUS; SUBCUTANEOUS at 06:03

## 2019-07-31 RX ADMIN — WATER 1 G: 1 INJECTION INTRAMUSCULAR; INTRAVENOUS; SUBCUTANEOUS at 02:17

## 2019-07-31 RX ADMIN — MELATONIN TAB 3 MG 3 MG: 3 TAB at 03:10

## 2019-07-31 RX ADMIN — PRAVASTATIN SODIUM 40 MG: 20 TABLET ORAL at 03:10

## 2019-07-31 RX ADMIN — Medication 10 ML: at 21:43

## 2019-07-31 RX ADMIN — THERA TABS 1 TABLET: TAB at 12:11

## 2019-07-31 RX ADMIN — Medication 10 ML: at 03:11

## 2019-07-31 SDOH — SOCIAL STABILITY - SOCIAL INSECURITY: PROBLEMS RELATED TO LIVING ALONE: Z60.2

## 2019-07-31 NOTE — PROGRESS NOTES
CM Note:  Reason for Admission:   Weakness, UTI               RRAT Score:     28             Resources/supports as identified by patient/family:   friend                Top Challenges facing patient (as identified by patient/family and CM): Finances/Medication cost?   Pt has Rx coverage and gets her medications by mail and from [facility] on [employer] [employer]. Transportation? Pt drives              Support system or lack thereof? Friend who she also states is a caregiver, Kirill Metz ([phone]) and family. She is in the [facility] system. Living arrangements? Single story home with no entry steps and built in shower seat. Self-care/ADLs/Cognition? PTA pt stated she was independent with ADL's, was driving and walked unaided. She is alert and oriented. Current Advanced Directive/Advance Care Plan:                          Yes  Plan for utilizing home health:    Has had [facility] in the past.  PT/OT to determine needs. Transition of Care Plan:                1. PT/OT evals  2. IV abx and IVF  3. Garrel Senters, to transport her at d/c  L. Nader Min RN    Care Management Interventions  PCP Verified by CM: Yes(Dr. Anna Chan. No NN.)  Palliative Care Criteria Met (RRAT>21 & CHF Dx)?: No  Mode of Transport at Discharge:  Other (see comment)(friend)  Transition of Care Consult (CM Consult): Discharge Planning  MyChart Signup: No  Discharge Durable Medical Equipment: No(Has a RW and canes at home.)  Physical Therapy Consult: Yes  Occupational Therapy Consult: Yes  Speech Therapy Consult: No  Current Support Network: Lives Alone, Own Home([facility]  Plan discussed with Pt/Family/Caregiver: Yes  Discharge Location  Discharge Placement: Home with family assistance

## 2019-07-31 NOTE — PROGRESS NOTES
[facility]: MED RECONCILIATION    Comments/Recommendations:   Med rec performed via interview with patient yobani was a very poor historian. Patient states she manages all of her own medications and denies anyone helping her to MD appointments or involved within her care. Patient seemed very unsure of most of her medications and states she takes her supplements \"when she remembers. \" Patient required multiple questions during interview for medications and lots of prompting to answer pharmacist questions. Pharmacist was unable to confirm zoloft or potassium via refill history however patient adamant she continues to take both  Patient unsure of when her last doses were.   Medications added:     tums    Medications removed:    Trazodone--pt states she does not take  Pravastatin--pt states she does not believe she is taking anymore    Medications adjusted:    Changed losartan to every other day as per patient  Changed melatonin to 10 mg from 5 mg as per patient  Changed metformin to 500 mg BID as per patient/refill history  Changed zoloft to AM as per patient     Information obtained from: patient, rx query    Significant PMH/Disease States:   Past Medical History:   Diagnosis Date    Arthritis     Breast CA ([facility]     Breast cancer ([facility]     Diabetes ([facility]     Diarrhea     chronic     Dyslipidemia     GERD (gastroesophageal reflux disease)     Hearing loss     Hypertension     LBBB (left bundle branch block)     chronic LBBB    Melanoma ([facility]     Non-ischemic cardiomyopathy ([facility]     Cath 2/16/15 - Mild (non-obstructive) CAD,LVEF 30%, 2+ MR    Seizures (HCC)     X1 post partum after first child was born- no more seizures    Skin cancer     melanoma on legs    Sleep apnea     wears O2 at night- cannot tolerate CPAP    Snoring     SOB (shortness of breath)     Systolic CHF [facility])     Admission 2/13/16    Thyroid disease        Chief Complaint for this Admission:   Chief Complaint   Patient presents with    Fatigue Allergies: Beta-blockers (beta-adrenergic blocking agts); Other medication; Phenobarbital; and Sulfa (sulfonamide antibiotics)    Prior to Admission Medications:   Prior to Admission Medications   Prescriptions Last Dose Informant Patient Reported? Taking? DOCOSAHEXANOIC ACID/EPA (FISH OIL PO)  Self Yes Yes   Sig: Take 1 Tab by mouth daily (after lunch). bumetanide (BUMEX) 1 mg tablet  Self No Yes   Sig: Take 1 Tab by mouth daily. Take an extra dose in the afternoon as needed for weight gain. calcium carbonate (TUMS) 200 mg calcium (500 mg) chew  Self Yes Yes   Sig: Take 1 Tab by mouth three (3) times daily as needed (GERD). carvedilol (COREG) 6.25 mg tablet  Self No Yes   Sig: TAKE 1 TABLET TWICE A DAY WITH MEALS   co-enzyme Q-10 (CO Q-10) 50 mg capsule  Self Yes Yes   Sig: Take 50 mg by mouth daily (after lunch). ergocalciferol (VITAMIN D2) 50,000 unit capsule  Self Yes Yes   Sig: Take one tablet on Monday, Wednesday and Friday. levothyroxine (SYNTHROID) 75 mcg tablet  Self Yes Yes   Sig: Take 75 mcg by mouth Daily (before breakfast). losartan (COZAAR) 25 mg tablet  Self Yes Yes   Sig: Take 25 mg by mouth every other day. melatonin 10 mg cap  Self Yes Yes   Sig: Take 10 mg by mouth nightly. metFORMIN (GLUCOPHAGE) 500 mg tablet  Self Yes Yes   Sig: Take 500 mg by mouth two (2) times daily (with meals). multivitamin (ONE A DAY) tablet  Self Yes Yes   Sig: Take 1 Tab by mouth daily (after lunch). potassium chloride (K-DUR, KLOR-CON) 20 mEq tablet  Self No Yes   Sig: Take 1 Tab by mouth daily. sertraline (ZOLOFT) 100 mg tablet  Self Yes Yes   Sig: Take 100 mg by mouth daily.       Facility-Administered Medications: None                    BAO Farley   Contact: [id number]

## 2019-07-31 NOTE — PROGRESS NOTES
TRANSFER - IN REPORT:    Verbal report received from Jose David Le [place] (name) on Riaz Martinez  being received from ED (unit) for routine progression of care      Report consisted of patients Situation, Background, Assessment and   Recommendations(SBAR). Information from the following report(s) SBAR, Kardex, ED Summary, Intake/Output, MAR and Recent Results was reviewed with the receiving nurse. Opportunity for questions and clarification was provided. Assessment completed upon patients arrival to unit and care assumed.

## 2019-07-31 NOTE — PROGRESS NOTES
Pt already seen by my colleague Dr. Mildred Lee for 7/31. Received in sign out. Noted UTI, JENN, diarrhea. IVF's and antibiotics ordered. F/u cultures.  Will need PT/OT assessment and dispo planning

## 2019-07-31 NOTE — CDMP QUERY
2.  Pt admitted for generalized weakness due to JENN, UTI and diarrhea. Is noted in review baseline creatinine appears to be around 1.2 with GFR of 37-40. Please specify below if the patient also has a diagnosis of CKD 
 
=> CKD 3 
=> Other explanation (please specify) 
=> Clinically unable to determine. Risk factors: elderly (79 y/o female), diabetes, hypertension Clinical Indicators: serum creatinine at baseline dating since 2016 is 1.2 with GFR of 37-40. Treatment: serial monitoring of labs Please clarify and document your clinical opinion in the progress notes and discharge summary including the definitive and/or presumptive diagnosis, (suspected or probable), related to the above clinical findings. Please include clinical findings supporting your diagnosis. Thank you for your time. Gilberto Madrigal RN, BSN 
[phone]

## 2019-07-31 NOTE — ED PROVIDER NOTES
80 y.o. female with past medical history significant for thyroid disease, HTN, HCL, DM, GERD who presents from [place] [id number] via EMS with chief complaint of fatigue. Pt states for the past couple days she had generally not felt well, including symptoms of fatigue, dizziness, generalized weakness with mild shortness of breath and diarrhea. Pt states last night her dizziness and weakness worsened, prompting her to call EMS. Per EMS, blood sugar on arrival was 331. Pt specifically denies any fever, chills, chest pain, abdominal pain, nausea, vomiting. She denies taking any medications for relief of symptoms or any other relieving or exacerbating factors. There are no other acute medical concerns at this time. PMHx: Significant for thyroid disease, HTN, HCL, DM, GERD, Arthritis, Breast cancer, sleep apnea, systolic CHF, Non-ischemic cardiomyopathy   PSHx: Significant for partial thyroidectomy, hysterectomy, cardiac catheterization, left oophorectomy, D&C, right knee replacement, left meniscus repair, cholecystectomy, right lumpectomy, bladder surgery, pacemaker placement   Social Hx: negative tobacco use, negative EtOH use, negative Illicit Drug use    PCP: Jeffery Alfonso MD    Note written by Saul Aguirre, as dictated by López Giles PA-C 12:11 AM    The history is provided by the EMS personnel and the patient. No  was used.         Past Medical History:   Diagnosis Date    Arthritis     [facility] [address] ([address] [facility]     Breast cancer ([facility]     Diabetes ([facility] [address]     Diarrhea     chronic     Dyslipidemia     GERD (gastroesophageal reflux disease)     Hearing loss     Hypertension     LBBB (left bundle branch block)     chronic LBBB    Melanoma ([address]     Non-ischemic cardiomyopathy ([address]     Cath 2/16/15 - Mild (non-obstructive) CAD,LVEF 30%, 2+ MR    Seizures (HCC)     X1 post partum after first child was born- no more seizures    Skin cancer     melanoma on legs    Sleep apnea     wears O2 at night- cannot tolerate CPAP    Snoring     SOB (shortness of breath)     Systolic CHF [facility])     Admission 2/13/16    Thyroid disease        Past Surgical History:   Procedure Laterality Date    HX BREAST LUMPECTOMY  2003    right    HX CATARACT REMOVAL  1994    bilateral    HX CHOLECYSTECTOMY      HX DILATION AND CURETTAGE  1960    HX HEART CATHETERIZATION      x2- no blockages    HX HEENT  2004    surgery on upper jaw    HX HYSTERECTOMY  1967    HX HYSTEROSCOPY      HX OOPHORECTOMY  1967    left    HX ORTHOPAEDIC  2010    right knee replacement    HX ORTHOPAEDIC      repair torn meniscus left knee    HX ORTHOPAEDIC  1996    repair knuckle on left pinky finger    HX OTHER SURGICAL  2000    remove benign tumore right side between rib and right lung    HX PACEMAKER      HX PARTIAL THYROIDECTOMY      HX UROLOGICAL  2003    bladder surgery x2         Family History:   Problem Relation Age of Onset    Stroke Mother        Social History     Socioeconomic History    Marital status:      Spouse name: Not on file    Number of children: Not on file    Years of education: Not on file    Highest education level: Not on file   Occupational History    Not on file   Social Needs    Financial resource strain: Not on file    Food insecurity:     Worry: Not on file     Inability: Not on file    Transportation needs:     Medical: Not on file     Non-medical: Not on file   Tobacco Use    Smoking status: Never Smoker    Smokeless tobacco: Never Used   Substance and Sexual Activity    Alcohol use:  Yes     Alcohol/week: 4.0 standard drinks     Types: 3 Glasses of wine, 1 Shots of liquor per week    Drug use: No    Sexual activity: Never   Lifestyle    Physical activity:     Days per week: Not on file     Minutes per session: Not on file    Stress: Not on file   Relationships    Social connections:     Talks on phone: Not on file     Gets together: Not on file Attends [employer] service: Not on file     Active member of club or organization: Not on file     Attends meetings of clubs or organizations: Not on file     Relationship status: Not on file    Intimate partner violence:     Fear of current or ex partner: Not on file     Emotionally abused: Not on file     Physically abused: Not on file     Forced sexual activity: Not on file   Other Topics Concern    Not on file   Social History Narrative    Not on file         ALLERGIES: Beta-blockers (beta-adrenergic blocking agts); Other medication; Phenobarbital; and Sulfa (sulfonamide antibiotics)    Review of Systems   Constitutional: Positive for fatigue. Negative for chills and fever. Respiratory: Positive for shortness of breath. Cardiovascular: Negative for chest pain. Gastrointestinal: Positive for diarrhea. Negative for abdominal pain, nausea and vomiting. Genitourinary: Negative for dysuria and frequency. Neurological: Positive for dizziness and weakness. All other systems reviewed and are negative. There were no vitals filed for this visit. Physical Exam   Constitutional: She is oriented to person, place, and time. She appears well-developed. Non-toxic appearance. She does not have a sickly appearance. She does not appear ill. No distress. HENT:   Head: Normocephalic and atraumatic. Right Ear: External ear normal.   Left Ear: External ear normal.   Nose: Nose normal.   Mouth/Throat: Oropharynx is clear and moist. No oropharyngeal exudate. Eyes: Conjunctivae, EOM and lids are normal. Right eye exhibits no discharge. Left eye exhibits no discharge. Neck: Normal range of motion. No tracheal deviation present. No thyromegaly present. Cardiovascular: Normal rate, regular rhythm, normal heart sounds and intact distal pulses. Pulmonary/Chest: Effort normal and breath sounds normal.   Abdominal: Soft.  Normal appearance and bowel sounds are normal.   Musculoskeletal: Normal range of motion. Mild peripheral edema noted to bilateral lower extremities. Neurological: She is alert and oriented to person, place, and time. Skin: Skin is warm and dry. Psychiatric: She has a normal mood and affect. Judgment normal.        MDM  Number of Diagnoses or Management Options  Diagnosis management comments: Final disposition per Dr. Dorota Andersen. Labs pending       Amount and/or Complexity of Data Reviewed  Clinical lab tests: reviewed           Procedures             ED EKG interpretation:  Rhythm: paced; and regular . Rate (approx.): 87; Axis: normal; P wave: normal; QRS interval: normal ; ST/T wave: normal; in  Lead: ; Other findings: abnormal ekg.    EKG documented by AVANI Herrera scribomaira, as interpreted by Dee Chambers MD, ED MD.

## 2019-07-31 NOTE — PROGRESS NOTES
Problem: Mobility Impaired (Adult and Pediatric)  Goal: *Acute Goals and Plan of Care (Insert Text)  Description  Physical Therapy Goals  Initiated 7/31/2019  1. Patient will move from supine to sit and sit to supine , scoot up and down and roll side to side in bed with modified independence within 7 day(s). 2.  Patient will transfer from bed to chair and chair to bed with modified independence using the least restrictive device within 7 day(s). 3.  Patient will perform sit to stand with modified independence within 7 day(s). 4.  Patient will ambulate with modified independence for 100 feet with the least restrictive device within 7 day(s). Note:   PHYSICAL THERAPY EVALUATION  Patient: Sheldon Sterling (22 y.o. female)  Date: 7/31/2019  Primary Diagnosis: Weakness [R53.1]        Precautions:  Fall    ASSESSMENT :  Based on the objective data described below, the patient presents with decreased standing balance and decreased tolerance for activity from her typical baseline of complete independence, driving. Patient on enteric precautions so PT had patient pace back and forth in her room with use of SPC. Patient with weakness and debility after repeated episodes of diarrhea at home and upon admission work up positive for UTI and JENN, R/O CDiff. Patient lives in independent living at [facility]. If she hires caregiver 24/7 then recommend return to home with HHPT follow up. Otherwise recommend SNF. Patient will benefit from skilled intervention to address the above impairments. Patients rehabilitation potential is considered to be Good  Factors which may influence rehabilitation potential include:   ? None noted  ? Mental ability/status  ? Medical condition  ? Home/family situation and support systems  ? Safety awareness  ? Pain tolerance/management  ?          Other:      PLAN :  Recommendations and Planned Interventions:  ?           Bed Mobility Training             ? Neuromuscular Re-Education  ? Transfer Training                   ? Orthotic/Prosthetic Training  ? Gait Training                         ? Modalities  ? Therapeutic Exercises           ? Edema Management/Control  ? Therapeutic Activities            ? Patient and Family Training/Education  ? Other (comment):    Frequency/Duration: Patient will be followed by physical therapy  5 times a week to address goals. Discharge Recommendations: 24/7 care at home or [address] Recommendations for Discharge: none     SUBJECTIVE:   Patient stated I don't feel as well today- I am achey all over.     OBJECTIVE DATA SUMMARY:   HISTORY:    Past Medical History:   Diagnosis Date    Arthritis     [facility]     [facility] cancer [facility]     Diabetes ([facility]     Diarrhea     chronic     Dyslipidemia     GERD (gastroesophageal reflux disease)     Hearing loss     Hypertension     LBBB (left bundle branch block)     chronic LBBB    Melanoma ([facility]     Non-ischemic cardiomyopathy ([facility]     Cath 2/16/15 - Mild (non-obstructive) CAD,LVEF 30%, 2+ MR    Seizures (HCC)     X1 post partum after first child was born- no more seizures    Skin cancer     melanoma on legs    Sleep apnea     wears O2 at night- cannot tolerate CPAP    Snoring     SOB (shortness of breath)     Systolic CHF ([facility]     Admission 2/13/16    Thyroid disease      Past Surgical History:   Procedure Laterality Date    HX BREAST LUMPECTOMY  2003    right    HX CATARACT REMOVAL  1994    bilateral    HX CHOLECYSTECTOMY      HX DILATION AND CURETTAGE  1960    HX HEART CATHETERIZATION      x2- no blockages    HX HEENT  2004    surgery on upper jaw    HX HYSTERECTOMY  1967    HX HYSTEROSCOPY      HX OOPHORECTOMY  1967    left    HX ORTHOPAEDIC  2010    right knee replacement    HX ORTHOPAEDIC      repair torn meniscus left knee    HX ORTHOPAEDIC  1996    repair knuckle on left pinky finger    HX OTHER SURGICAL  2000    remove benign tumore right side between rib and right lung    HX PACEMAKER      HX PARTIAL THYROIDECTOMY      HX UROLOGICAL  2003    bladder surgery x2     Prior Level of Function/Home Situation: independent in 3 bedroom cottage at [facility], cares for animals, drives, amb w/o assistive device  Personal factors and/or comorbidities impacting plan of care:     Home Situation  Home Environment: Independent living([facility])  One/Two Story Residence: One story  Living Alone: Yes  Support Systems: Family member(s), Friends \ neighbors  Patient Expects to be Discharged to[de-identified] Private residence  Current DME Used/Available at Home: Shower chair, Cane, straight, Walker, rollator  Tub or Shower Type: Shower    EXAMINATION/PRESENTATION/DECISION MAKING:   Critical Behavior:  Neurologic State: Alert  Orientation Level: Oriented X4  Cognition: Follows commands  Safety/Judgement: Awareness of environment  Hearing: Auditory  Auditory Impairment: Hard of hearing, bilateral    Range Of Motion:  AROM: Within functional limits           PROM: Within functional limits           Strength:    Strength: Within functional limits                    Tone & Sensation:   Tone: Normal              Sensation: Intact               Coordination:  Coordination: Within functional limits  Vision:   Corrective Lenses: Glasses  Functional Mobility:  Bed Mobility:     Supine to Sit: Stand-by assistance; Additional time  Sit to Supine: Stand-by assistance; Additional time  Scooting: Stand-by assistance  Transfers:  Sit to Stand: Minimum assistance  Stand to Sit: Contact guard assistance                       Balance:   Sitting: Intact  Standing: With support  Standing - Static: Fair  Standing - Dynamic : Fair  Ambulation/Gait Training:  Distance (ft): 40 Feet (ft)  Assistive Device: Cane, straight;Gait belt  Ambulation - Level of Assistance: Contact guard assistance        Gait Abnormalities: Decreased step clearance; Path deviations       Stairs - Level of Assistance: (NT)    Functional Measure:  Barthel Index:    Bathin  Bladder: 5  Bowels: 5  Groomin  Dressin  Feeding: 10  Mobility: 10  Stairs: 0  Toilet Use: 5  Transfer (Bed to Chair and Back): 10  Total: 55/100       Percentage of impairment   0%   1-19%   20-39%   40-59%   60-79%   80-99%   100%   Barthel Score 0-100 100 99-80 79-60 59-40 20-39 1-19   0     The Barthel ADL Index: Guidelines  1. The index should be used as a record of what a patient does, not as a record of what a patient could do. 2. The main aim is to establish degree of independence from any help, physical or verbal, however minor and for whatever reason. 3. The need for supervision renders the patient not independent. 4. A patient's performance should be established using the best available evidence. Asking the patient, friends/relatives and nurses are the usual sources, but direct observation and common sense are also important. However direct testing is not needed. 5. Usually the patient's performance over the preceding 24-48 hours is important, but occasionally longer periods will be relevant. 6. Middle categories imply that the patient supplies over 50 per cent of the effort. 7. Use of aids to be independent is allowed. Beau Peña., Barthel, D.W. ([id number]). Functional evaluation: the Barthel Index. [facility] (14)2. [facility] FAREED Escalera, Eros Arvizu.Sadia., [place], [address] [place] [address] (). Measuring the change indisability after inpatient rehabilitation; comparison of the responsiveness of the Barthel Index and Functional [place] Measure. Journal of Neurology, Neurosurgery, and Psychiatry, 66(4), 943-175. Carmen Dominguez, N.J.A, DAVID Payne, & Velvet Barnhart MKESHA. (2004.) Assessment of post-stroke quality of life in cost-effectiveness studies:  The usefulness of the Barthel Index and the EuroQoL-5D. Quality of Life Research, 15, 033-77           Physical Therapy Evaluation Charge Determination   History Examination Presentation Decision-Making   MEDIUM  Complexity : 1-2 comorbidities / personal factors will impact the outcome/ POC  MEDIUM Complexity : 3 Standardized tests and measures addressing body structure, function, activity limitation and / or participation in recreation  LOW Complexity : Stable, uncomplicated  Other outcome measures Barthel Index  LOW       Based on the above components, the patient evaluation is determined to be of the following complexity level: LOW     Pain:  Pain Scale 1: Numeric (0 - 10)  Pain Intensity 1: 0              Activity Tolerance:   WFLs  Please refer to the flowsheet for vital signs taken during this treatment. After treatment:   ?         Patient left in no apparent distress sitting up in chair  ? Patient left in no apparent distress in bed  ? Call bell left within reach  ? Nursing notified  ? Caregiver present  ? Bed alarm activated    COMMUNICATION/EDUCATION:   The patients plan of care was discussed with: Occupational Therapist and Registered Nurse. ?         Fall prevention education was provided and the patient/caregiver indicated understanding. ? Patient/family have participated as able in goal setting and plan of care. ?         Patient/family agree to work toward stated goals and plan of care. ?         Patient understands intent and goals of therapy, but is neutral about his/her participation. ? Patient is unable to participate in goal setting and plan of care.     Thank you for this referral.  Patricia Yoon, PT, DPT   Time Calculation: 12 mins

## 2019-07-31 NOTE — PROGRESS NOTES
Problem: Self Care Deficits Care Plan (Adult)  Goal: *Acute Goals and Plan of Care (Insert Text)  Description    FUNCTIONAL STATUS PRIOR TO ADMISSION: Patient was independent and active without use of DME.    HOME SUPPORT: The patient lived alone with friend to provide assistance. She reports will hire as caregiver. Occupational Therapy Goals  Initiated 7/31/2019  1. Patient will perform lower body dressing with modified independence within 7 day(s). 2.  Patient will perform grooming with modified independence within 7 day(s). 3.  Patient will perform toilet transfers with modified independence within 7 day(s). 4.  Patient will perform all aspects of toileting with modified independence within 7 day(s). 5.  Patient will participate in upper extremity therapeutic exercise/activities with independence for 10 minutes within 7 day(s). Outcome: Progressing Towards Goal   OCCUPATIONAL THERAPY EVALUATION  Patient: Daniel Loja (2 y.o. female)  Date: 7/31/2019  Primary Diagnosis: Weakness [R53.1]        Precautions:   Fall    ASSESSMENT :  Based on the objective data described below, the patient presents with hospital admission secondary to weakness. Patient reports several days of diarrhea at admission. Work up indicates UTI and JENN. Patient independent at baseline, living alone in 3 bed room [facility]. Patient reports caring for 2 cats, driving and performing all ADLs. Today, patient received supine in bed, agreeable to activity. She provides al her own history. Supine to sit with stand by assist and increased time. Patient performs cross leg technique to doff socks but with difficulty donning  sock today. Patient requires min assist for LE dressing. She performs sit to stand with min assist and prefers use of personal cane in room. Patient unsteady and holding therapist for additional support. [facility]   Patient provide with RW  and agreeable to use for return to bed. Patient manages hygiene with SBA from seated position. Patient returned to bed with use of RW and verbal cues for management. Sit to supine with SBA and increased time. Patient will benefit from continued OT services to increase safety and independence with ADL tasks. If patient returns home, recommend [facility] and caregiver assist. If caregiver unable to remain with patient recommend SNF for continued progression until patient able to care for self alone. Patient will benefit from skilled intervention to address the above impairments. Patients rehabilitation potential is considered to be Good  Factors which may influence rehabilitation potential include:   ? None noted  ? Mental ability/status  ? Medical condition  ? Home/family situation and support systems  ? Safety awareness  ? Pain tolerance/management  ? Other:      PLAN :  Recommendations and Planned Interventions:  ?               Self Care Training                  ? Therapeutic Activities  ? Functional Mobility Training    ? Cognitive Retraining  ? Therapeutic Exercises           ? Endurance Activities  ? Balance Training                   ? Neuromuscular Re-Education  ? Visual/Perceptual Training     ? Home Safety Training  ? Patient Education                 ? Family Training/Education  ? Other (comment):    Frequency/Duration: Patient will be followed by occupational therapy 5 times a week to address goals. Discharge Recommendations: Home Health if able to have caregiver in home, vs [facility] if will be home alone  Further Equipment Recommendations for Discharge: TBD      SUBJECTIVE:   Patient stated I normally take care of my two cats.     OBJECTIVE DATA SUMMARY:   HISTORY:   Past Medical History:   Diagnosis Date    Arthritis     Bre[facility] CA ([facility]     Breast cancer ([facility]     Diabetes ([facility]     Diarrhea     chronic     Dyslipidemia     GERD (gastroesophageal reflux disease)     Hearing loss     Hypertension     LBBB (left bundle branch block)     chronic LBBB    Melanoma ([facility]     Non-ischemic cardiomyopathy ([facility]     Cath 2/16/15 - Mild (non-obstructive) CAD,LVEF 30%, 2+ MR    Seizures (HCC)     X1 post partum after first child was born- no more seizures    Skin cancer     melanoma on legs    Sleep apnea     wears O2 at night- cannot tolerate CPAP    Snoring     SOB (shortness of breath)     Systolic CHF ([facility]     Admission 2/13/16    Thyroid disease      Past Surgical History:   Procedure Laterality Date    HX BREAST LUMPECTOMY  2003    right    HX CATARACT REMOVAL  1994    bilateral    HX CHOLECYSTECTOMY      HX DILATION AND CURETTAGE  1960    HX HEART CATHETERIZATION      x2- no blockages    HX HEENT  2004    surgery on upper jaw    HX HYSTERECTOMY  1967    HX HYSTEROSCOPY      HX OOPHORECTOMY  1967    left    HX ORTHOPAEDIC  2010    right knee replacement    HX ORTHOPAEDIC      repair torn meniscus left knee    HX ORTHOPAEDIC  1996    repair knuckle on left pinky finger    HX OTHER SURGICAL  2000    remove benign tumore right side between rib and right lung    HX PACEMAKER      HX PARTIAL THYROIDECTOMY      HX UROLOGICAL  2003    bladder surgery x2       Prior Level of Function/Environment/Context: Independent   Occupations in which the patient is/was successful, what are the barriers preventing that success:   Performance Patterns (routines, roles, habits, and rituals):   Personal Interests and/or values:   Expanded or extensive additional review of patient history:     Home Situation  Home Environment: Independent living([facility])  One/Two Story Residence: One story  Living Alone: Yes  Support Systems: Family member(s), Friends \ neighbors  Patient Expects to be Discharged to[de-identified] Private residence  Current DME Used/Available at Home: Shower chair, Cleaster Nail, straight, Walker, rollator  Tub or Shower Type: Shower    Hand dominance: Right    EXAMINATION OF PERFORMANCE DEFICITS:  Cognitive/Behavioral Status:  Neurologic State: Alert  Orientation Level: Oriented X4  Cognition: Follows commands  Perception: Appears intact  Perseveration: No perseveration noted  Safety/Judgement: Awareness of environment    Skin: intact as seen    Edema: none noted     Hearing: Auditory  Auditory Impairment: Hard of hearing, bilateral    Vision/Perceptual:                                Corrective Lenses: Glasses    Range of Motion:  UE ROM WFL                            Strength:  Generally decreased, functional                   Coordination:     Fine Motor Skills-Upper: Left Intact; Right Intact    Gross Motor Skills-Upper: Left Intact; Right Intact    Tone & Sensation:  Normal tone, sensation intact                            Balance:  Sitting: Intact  Standing: With support  Standing - Static: Fair  Standing - Dynamic : Fair    Functional Mobility and Transfers for ADLs:  Bed Mobility:  Supine to Sit: Stand-by assistance; Additional time  Sit to Supine: Stand-by assistance; Additional time  Scooting: Stand-by assistance    Transfers:  Sit to Stand: Minimum assistance  Stand to Sit: Contact guard assistance  Bathroom Mobility: Minimum assistance  Toilet Transfer : Minimum assistance    ADL Assessment:  Feeding: Supervision    Oral Facial Hygiene/Grooming: Supervision(seated )    Bathing: Minimum assistance(distal LEs )    Upper Body Dressing: Supervision    Lower Body Dressing: Minimum assistance    Toileting: Contact guard assistance                ADL Intervention and task modifications:                                     Cognitive Retraining  Safety/Judgement: Awareness of environment    Therapeutic Exercise:      Functional Measure:  Barthel Index:    Bathin  Bladder: 5  Bowels: 5  Groomin  Dressing: 5  Feeding: 10  Mobility: 10  Stairs: 0  Toilet Use: 5  Transfer (Bed to Chair and Back): 10  Total: 55/100        Percentage of impairment   0%   1-19%   20-39%   40-59%   60-79%   80-99%   100%   Barthel Score 0-100 100 99-80 79-60 59-40 20-39 1-19   0     The Barthel ADL Index: Guidelines  1. The index should be used as a record of what a patient does, not as a record of what a patient could do. 2. The main aim is to establish degree of independence from any help, physical or verbal, however minor and for whatever reason. 3. The need for supervision renders the patient not independent. 4. A patient's performance should be established using the best available evidence. Asking the patient, friends/relatives and nurses are the usual sources, but direct observation and common sense are also important. However direct testing is not needed. 5. Usually the patient's performance over the preceding 24-48 hours is important, but occasionally longer periods will be relevant. 6. Middle categories imply that the patient supplies over 50 per cent of the effort. 7. Use of aids to be independent is allowed. Mary Jo Youssef., Barthel, DHarryW. ([id number]). Functional evaluation: the Barthel Index. [address] [facility] (14)2. FAREED Lopez, Jeremy Kellogg, [facility] [place], [address] [place] [address] Ave (1999). Measuring the change indisability after inpatient rehabilitation; comparison of the responsiveness of the Barthel Index and Functional [place] Measure. Journal of Neurology, Neurosurgery, and Psychiatry, 66(4), 441-472. Edith Haynes, N.J.A, DAVID Payne, & Delmus Brittle, MHarryA. (2004.) Assessment of post-stroke quality of life in cost-effectiveness studies: The usefulness of the Barthel Index and the EuroQoL-5D.  Quality of Life Research, 15, 204-89         Occupational Therapy Evaluation Charge Determination   History Examination Decision-Making   LOW Complexity : Brief history review  LOW Complexity : 1-3 performance deficits relating to physical, cognitive , or psychosocial skils that result in activity limitations and / or participation restrictions  LOW Complexity : No comorbidities that affect functional and no verbal or physical assistance needed to complete eval tasks       Based on the above components, the patient evaluation is determined to be of the following complexity level: LOW   Pain:  Pain Scale 1: Numeric (0 - 10)  Pain Intensity 1: 0              Activity Tolerance:   VSS  Please refer to the flowsheet for vital signs taken during this treatment. After treatment:   ? Patient left in no apparent distress sitting up in chair  ? Patient left in no apparent distress in bed  ? Call bell left within reach  ? Nursing notified  ? Caregiver present  ? Bed alarm activated    COMMUNICATION/EDUCATION:   The patients plan of care was discussed with: Physical Therapist and Registered Nurse.  ? Home safety education was provided and the patient/caregiver indicated understanding. ? Patient/family have participated as able in goal setting and plan of care. ? Patient/family agree to work toward stated goals and plan of care. ? Patient understands intent and goals of therapy, but is neutral about his/her participation. ? Patient is unable to participate in goal setting and plan of care. This patients plan of care is appropriate for delegation to [facility].     Thank you for this referral.  Krissy Starr, OTR/L  Time Calculation: 34 mins

## 2019-07-31 NOTE — ACP (ADVANCE CARE PLANNING)
Advance Care Planning (ACP) Provider Note - Comprehensive      Date of ACP Conversation:  07/31/19    Persons included in Conversation:  patient and daughter  Length of ACP Conversation in minutes:  16 minutes     Authorized Decision Maker (if patient is incapable of making informed decisions): This person is: patient's daughters       General ACP for ALL Patients with Decision Making Capacity:  Importance of advance care planning, including choosing a healthcare agent to communicate patient's healthcare decisions if patient lost the ability to make decisions. Review of Existing Advance Directive:  Patient and family do have a current advance directive however not readily for review. 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    Thyroid disease E07.9    Breast CA ([facility]) C50.919    Diarrhea R19.7    Anemia D64.9    Weakness R53.1    UTI (urinary tract infection) N39.0    JENN (acute kidney injury) ([facility]) N17.9       These active diagnoses are of sufficient risk that focused discussion on advance care planning is indicated in order to allow the patient to thoughtfully consider personal goals of care; and, if situations arise that prevent the ability to personally give input, to ensure appropriate representation of their personal desires for different levels and aggressiveness of care. For Serious or Chronic Illness:  Understanding of medical condition     Reviewed pt's clinical status.  Meghan Alvarez has multiple medical problems including CHF/ICM, DM, HTN and is advanced in age. She is being admitted for weakness, UTI, and JENN. We reviewed our treatment plan and anticipated discharge plans. Further, we discussed pt's wishes on how she would like to proceed (aggressive/heroic resuscitation vs not intervening and allowing nature takes its course) if she were to suffer cardiopulmonary arrest.    Understanding of CPR, goals and expected outcomes, benefits and burdens discussed. Information on CPR success provided (many factors lower a patients chance of survival, including advanced age, performance status, malignancy, and presence of multiple comorbidities); Individual asked to communicate understanding of information in his/her own words. Patient made it very clear to me that she would not want heroic measures for resuscitation in the event of cardiopulmonary arrest, including chest compressions, defibrillation, intubation/mechanical ventilation.  She is alert and oriented x 4 and daughter at bedside witnesses and participates in our conversation      Interventions Provided:  DNR code status

## 2019-07-31 NOTE — DIABETES MGMT
[facility]    [facility] Progress Note    Recommendations/ Comments: Chart reviewed for hyperglycemia. Lispro insulin correction started this morning, but POC glucose remain consistently above 200 mg/dL, after receiving 11 units of lispro as of chart note. Awaiting A1c results. Noted JENN. If appropriate, please consider: 1. Add Lantus 10 units daily (0.15 units/kg). Current hospital DM medication:   Lispro normal sensitivity correction scale    Chart reviewed on [address]. Patient is a 80 y.o. female with known Type 2 Diabetes on oral agent (monotherapy): metformin (generic) 500 mg BID at home. A1c:   Lab Results   Component Value Date/Time    Hemoglobin A1c 6.9 (H) 02/02/2017 12:00 PM    Hemoglobin A1c 6.8 (H) 02/13/2016 11:35 AM    Hemoglobin A1c, External 6.9 03/17/2016       Recent Glucose Results:   Lab Results   Component Value Date/Time     (H) 07/31/2019 12:32 AM    GLUCPOC 277 (H) 07/31/2019 03:40 PM    GLUCPOC 265 (H) 07/31/2019 12:00 PM    GLUCPOC 240 (H) 07/31/2019 06:44 AM        Lab Results   Component Value Date/Time    Creatinine 1.61 (H) 07/31/2019 12:32 AM     Estimated Creatinine Clearance: 22.2 mL/min (A) (based on SCr of 1.61 mg/dL (H)). Active Orders   Diet    DIET DIABETIC WITH OPTIONS Consistent Carb 2000kcal; Regular; 2 GM NA (House Low NA); AHA-LOW-CHOL FAT        PO intake: No data found. Will continue to follow as needed.     Thank you  Nu Alvarado RN  Office [phone]  Pager [phone]          Time spent: 5 min

## 2019-07-31 NOTE — ED NOTES
TRANSFER - OUT REPORT:    Verbal report given to VIC Carlos(name) on [employer]  being transferred to [facility](unit) for routine progression of care       Report consisted of patients Situation, Background, Assessment and   Recommendations(SBAR). Information from the following report(s) SBAR, ED Summary, Procedure Summary, MAR, Recent Results and Cardiac Rhythm NSR was reviewed with the receiving nurse. Lines:   Peripheral IV 07/31/19 Left Antecubital (Active)   Site Assessment Clean, dry, & intact 7/31/2019  1:01 AM   Phlebitis Assessment 0 7/31/2019  1:01 AM   Infiltration Assessment 0 7/31/2019  1:01 AM   Dressing Status Clean, dry, & intact 7/31/2019  1:01 AM        Opportunity for questions and clarification was provided.       Patient transported with:   Monitor  Registered Nurse

## 2019-07-31 NOTE — PROGRESS NOTES
Bedside shift change report given to John Melissa RN (oncoming nurse) by Diane Stokes RN (offgoing nurse). Report included the following information SBAR and Kardex.

## 2019-07-31 NOTE — CDMP QUERY
Pt admitted with generalized weakness due to UTI, JENN and diarrhea. Noted documentation of \"IVVD\". Please further specify if you are treating any of the following. 
 
=> Dehydration 2/2 diarrhea and decreased po intake 
=> other explanation (please specify) 
=> Clinically unable to determine. Risk factors: JENN, diarrhea, UTI, decreased po intake recently Clinical Indicators: Dr. Nanette Herrera in H and Ramiro Og likely from IVVD from diarrhea. Na 34 on admission. Treatment: NS bolus 500 ml in ED and infusion at 75 ml/hr. Please clarify and document your clinical opinion in the progress notes and discharge summary including the definitive and/or presumptive diagnosis, (suspected or probable), related to the above clinical findings. Please include clinical findings supporting your diagnosis. Thank you for your time. Gilberto Madrigal RN, BSN 
[phone]

## 2019-07-31 NOTE — H&P
[facility]  [address], [place] [address]  [phone]    Hospitalist Admission Note      NAME:  Meghan Alvarez   :   1928   MRN:  [id number]     PCP:  Ana Lilia Muniz MD     Date/Time:  2019 2:30 AM         Assessment / Plan:         Weakness: generalized; due to UTI and diarrhea. Treat as below. PT/OT. Check TSH, B12 due to macrocytic anemia. UTI: symptomatic, and evident on UA. Start IV CTX. Follow urine culture. Diarrhea: no abdominal pain or nausea. No fevers or chills or leukocytosis. No recent abx or sick contacts. Send stool studies. Start pro-biotics      JENN: mild. Likely from IVVD from diarrhea. Given IVF in ED. Hold further IVF due to CHF. HOLD ARB      HFrEF (heart failure with reduced ejection fraction) / Biventricular automatic implantable cardioverter defibrillator in situ: recent EF improved to 45%. Compensated, in fact a little volume-depleted. HOLD diuretics and losartan      Hypertension (): BP controlled. Continue Coreg. Hold losartan      Sleep apnea (): Overview: wears O2 at night- cannot tolerate CPAP      DM type 2 (diabetes mellitus, type 2) with nephropathy: appears poorly controlled. Hold metformin due to JENN. Start SSI. May need scheduled insulin      Anemia: macrocytic. Check B12, folate, TSH. Follow Hg      Thyroid disease: check TSH. Continue Synthroid      Breast CA: outpatient follow up    Code Status: DNR, see ACP note     Surrogate decision maker: daughters    ED notes and lab results reviewed.        Total time spent with patient: 55 Minutes   Time spent in the care of this patient included reviewing records, discussing with nursing, obtaining history and examining the patient, and discussing treatment plans, with >50% time spent counseling/coordinating care    Risk of deterioration: High                 Care Plan discussed with: ED provider, Patient, Family, Nursing Staff and >50% of time spent in counseling and coordination of care    Discussed:  Code Status, Care Plan and D/C Planning    Prophylaxis:  Hep SQ    Disposition:  SNF/LTC          Subjective:     CHIEF COMPLAINT: weakness    HISTORY OF PRESENT ILLNESS:     Ms. Curtis Grover is a 80 y.o. female w/ hx of HFpEF, HTN, DM, hypothyroidism who presents with weakness. Started about 3 days ago, moderate, constant, worsening, associated with dysuria. In her own words, pt thought she had \"cystitis\" as she had burning with urination and similar symptoms before. No fevers or chills. Also notes constant diarrhea for the past few days. No abdominal pain or nausea. ED workup showed mild JENN, pyuria with bacteruriria. Ms. Curtis Grover is admitted for further evaluation and management.       Past Medical History:   Diagnosis Date    Arthritis     Breast CA ([address]     Breast cancer ([address]     Diabetes ([address]     Diarrhea     chronic     Dyslipidemia     GERD (gastroesophageal reflux disease)     Hearing loss     Hypertension     LBBB (left bundle branch block)     chronic LBBB    Melanoma ([address]     Non-ischemic cardiomyopathy ([address]     Cath 2/16/15 - Mild (non-obstructive) CAD,LVEF 30%, 2+ MR    Seizures (HCC)     X1 post partum after first child was born- no more seizures    Skin cancer     melanoma on legs    Sleep apnea     wears O2 at night- cannot tolerate CPAP    Snoring     SOB (shortness of breath)     Systolic CHF ([address]     Admission 2/13/16    Thyroid disease         Past Surgical History:   Procedure Laterality Date    HX BREAST LUMPECTOMY  2003    right    HX CATARACT REMOVAL  1994    bilateral    HX CHOLECYSTECTOMY      HX DILATION AND CURETTAGE  1960    HX HEART CATHETERIZATION      x2- no blockages    HX HEENT  2004    surgery on upper jaw    HX HYSTERECTOMY  1967    HX HYSTEROSCOPY      HX OOPHORECTOMY  1967    left    HX ORTHOPAEDIC  2010    right knee replacement    HX ORTHOPAEDIC      repair torn meniscus left knee    [place] repair knuckle on left pinky finger    HX OTHER SURGICAL  2000    remove benign tumore right side between rib and right lung    HX PACEMAKER      HX PARTIAL THYROIDECTOMY      HX UROLOGICAL  2003    bladder surgery x2       Social History     Tobacco Use    Smoking status: Never Smoker    Smokeless tobacco: Never Used   Substance Use Topics    Alcohol use: Yes     Alcohol/week: 4.0 standard drinks     Types: 3 Glasses of wine, 1 Shots of liquor per week        Family History   Problem Relation Age of Onset    Stroke Mother         Allergies   Allergen Reactions    Beta-Blockers (Beta-Adrenergic Blocking Agts) Unknown (comments)     Made her feel tired, breakout on her back    Other Medication Unknown (comments)     Bromides    Phenobarbital Unknown (comments)    Sulfa (Sulfonamide Antibiotics) Swelling     rash        Prior to Admission medications    Medication Sig Start Date End Date Taking? Authorizing Provider   bumetanide (BUMEX) 1 mg tablet Take 1 Tab by mouth daily. Take an extra dose in the afternoon as needed for weight gain. 5/23/19   Cruz Barney MD   carvedilol (COREG) 6.25 mg tablet TAKE 1 TABLET TWICE A DAY WITH MEALS 4/26/19   Cruz Barney MD   levothyroxine (SYNTHROID) 75 mcg tablet Take 75 mcg by mouth. 5/5/18   Provider, Historical   losartan (COZAAR) 25 mg tablet Take 1 Tab by mouth daily. 11/15/18   Cruz Barney MD   traZODone (DESYREL) 50 mg tablet Take 50 mg by mouth nightly. 5/5/18   Provider, Historical   potassium chloride (K-DUR, KLOR-CON) 20 mEq tablet Take 1 Tab by mouth daily. 4/27/17   Cruz Barney MD   pravastatin (PRAVACHOL) 40 mg tablet Take 1 Tab by mouth nightly. 12/12/16   Cruz Barney MD   co-enzyme Q-10 (CO Q-10) 50 mg capsule Take 50 mg by mouth daily (after lunch). Provider, Historical   sertraline (ZOLOFT) 100 mg tablet Take 100 mg by mouth nightly.     Other, MD Christian   metFORMIN (GLUCOPHAGE) 1,000 mg tablet Take 1,000 mg by mouth two (2) times daily (with meals). Christian Dang MD   ergocalciferol (VITAMIN D2) 50,000 unit capsule Take one tablet on Monday, Wednesday and Friday. Christian Dang MD   melatonin 10 mg cap Take 5 mg by mouth nightly. Christian Dang MD   multivitamin (ONE A DAY) tablet Take 1 Tab by mouth daily (after lunch). Christian Dang MD   DOCOSAHEXANOIC ACID/EPA (FISH OIL PO) Take 1 Tab by mouth daily (after lunch). Christian Dang MD       Review of Systems:  (bold if positive, if negative)    Gen:  fatigueEyes:  ENT:  CVS:  Pulm:  GI:  diarrhea  :  dysuria  MS:  weaknessSkin:  Psych:  Endo:    Hem:  Renal:    Neuro:            Objective:      VITALS:    Vital signs reviewed; most recent are:    Visit Vitals  /70   Pulse 75   Temp 98.5 °F (36.9 °C)   Resp 15   Ht 5' 4\" (1.626 m)   Wt 72.6 kg (160 lb)   SpO2 99%   BMI 27.46 kg/m²     SpO2 Readings from Last 6 Encounters:   07/31/19 99%   06/06/19 97%   05/23/19 95%   01/16/19 96%   07/19/18 96%   04/21/18 97%        No intake or output data in the 24 hours ending 07/31/19 0230         Exam:     Physical Exam:    Gen:  Well-developed, well-nourished, in no acute distress  HEENT:  No scleral icterus, PERRL, hearing intact to voice, dry mucous membranes  Neck:  Supple, without masses. Thyroid non-tender  Resp:  No accessory muscle use. CTAB without wheezing, rales, rhonchi  Card: RRR. Normal S1 and S2 without murmurs, rubs, or gallops. Trace peripheral lower extremity edema. No JVD. Peripheral pulses in tact. Abd:  Normoactive bowel sounds. Soft, non-tender, non-distended. No rebound, no guarding. No appreciable hepatosplenomegaly   Lymph:  No cervical adenopathy  Musc:  No cyanosis or clubbing  Skin:  No rashes or ulcers; turgor intact. Neuro:  Cranial nerves are grossly intact, generalized motor weakness with nothing focal. Follows commands appropriately  Psych:  Good insight, normal affect. Alert, oriented x 3.  Answers questions appropriately       Labs:    Recent Labs 07/31/19  0032   WBC 4.0   HGB 10.8*   HCT 31.3*        Recent Labs     07/31/19  0032   *   K 4.2      CO2 21   *   BUN 37*   CREA 1.61*   CA 9.3   ALB 3.3*   SGOT 22   ALT 18     No components found for: GLPOC  No results for input(s): PH, PCO2, PO2, HCO3, FIO2 in the last 72 hours. No results for input(s): INR in the last 72 hours. No lab exists for component: INREXT  Lab Results   Component Value Date/Time    Specimen Description: URINE 01/15/2014 01:50 PM    Specimen Description: URINE 10/25/2010 07:00 AM    Specimen Description: URINE 10/15/2010 11:00 AM     Lab Results   Component Value Date/Time    Culture result: NO SIGNIFICANT GROWTH 12/01/2015 04:05 PM    Culture result: KLEBSIELLA PNEUMONIAE 01/15/2014 01:50 PM    Culture result: ENTEROCOCCUS FAECALIS GROUP D 10/25/2010 07:00 AM     All other current labs reviewed in the computer. Imaging/Studies:    CXR: Chronic appearing lung changes with scarring in the left midlung. No definite  acute process.     Imaging personally reviewed    EKG: v-paced  EKG personally reviewed    ___________________________________________________    Attending Physician: Hugh Godinez MD

## 2019-07-31 NOTE — ED TRIAGE NOTES
Pt to the ED via EMS c/o general malaise. Per EMS, they were called to [id number] [facility] for \"difficulty breathing. \" Pt is in no acute distress. Per EMS, pt has not been eating normally and hasnt felt right for the past 3 days. FSBS: 331 per EMS.

## 2019-07-31 NOTE — ED NOTES
Pt Throughput: Charge Nurse on 5th floor  made aware of patient's bed assignment, room 534. Curtis Oropeza RN  Emergency Dept Charge RN.

## 2019-08-01 LAB
ALBUMIN SERPL-MCNC: 2.6 G/DL (ref 3.5–5)
ALBUMIN/GLOB SERPL: 0.8 {RATIO} (ref 1.1–2.2)
ALP SERPL-CCNC: 58 U/L (ref 45–117)
ALT SERPL-CCNC: 15 U/L (ref 12–78)
ANION GAP SERPL CALC-SCNC: 5 MMOL/L (ref 5–15)
AST SERPL-CCNC: 13 U/L (ref 15–37)
BASOPHILS # BLD: 0 K/UL (ref 0–0.1)
BASOPHILS NFR BLD: 1 % (ref 0–1)
BILIRUB SERPL-MCNC: 0.3 MG/DL (ref 0.2–1)
BUN SERPL-MCNC: 26 MG/DL (ref 6–20)
BUN/CREAT SERPL: 22 (ref 12–20)
CALCIUM SERPL-MCNC: 7.8 MG/DL (ref 8.5–10.1)
CHLORIDE SERPL-SCNC: 109 MMOL/L (ref 97–108)
CO2 SERPL-SCNC: 24 MMOL/L (ref 21–32)
CREAT SERPL-MCNC: 1.19 MG/DL (ref 0.55–1.02)
DIFFERENTIAL METHOD BLD: ABNORMAL
EOSINOPHIL # BLD: 0.2 K/UL (ref 0–0.4)
EOSINOPHIL NFR BLD: 5 % (ref 0–7)
ERYTHROCYTE [DISTWIDTH] IN BLOOD BY AUTOMATED COUNT: 14.6 % (ref 11.5–14.5)
EST. AVERAGE GLUCOSE BLD GHB EST-MCNC: 206 MG/DL
FERRITIN SERPL-MCNC: 54 NG/ML (ref 8–252)
GLOBULIN SER CALC-MCNC: 3.2 G/DL (ref 2–4)
GLUCOSE BLD STRIP.AUTO-MCNC: 178 MG/DL (ref 65–100)
GLUCOSE BLD STRIP.AUTO-MCNC: 235 MG/DL (ref 65–100)
GLUCOSE BLD STRIP.AUTO-MCNC: 261 MG/DL (ref 65–100)
GLUCOSE BLD STRIP.AUTO-MCNC: 295 MG/DL (ref 65–100)
GLUCOSE SERPL-MCNC: 177 MG/DL (ref 65–100)
HBA1C MFR BLD: 8.8 % (ref 4.2–6.3)
HCT VFR BLD AUTO: 24.9 % (ref 35–47)
HGB BLD-MCNC: 8.5 G/DL (ref 11.5–16)
IMM GRANULOCYTES # BLD AUTO: 0 K/UL (ref 0–0.04)
IMM GRANULOCYTES NFR BLD AUTO: 1 % (ref 0–0.5)
LYMPHOCYTES # BLD: 1.3 K/UL (ref 0.8–3.5)
LYMPHOCYTES NFR BLD: 36 % (ref 12–49)
MAGNESIUM SERPL-MCNC: 1.8 MG/DL (ref 1.6–2.4)
MCH RBC QN AUTO: 34.8 PG (ref 26–34)
MCHC RBC AUTO-ENTMCNC: 34.1 G/DL (ref 30–36.5)
MCV RBC AUTO: 102 FL (ref 80–99)
MONOCYTES # BLD: 0.3 K/UL (ref 0–1)
MONOCYTES NFR BLD: 9 % (ref 5–13)
NEUTS SEG # BLD: 1.7 K/UL (ref 1.8–8)
NEUTS SEG NFR BLD: 48 % (ref 32–75)
NRBC # BLD: 0 K/UL (ref 0–0.01)
NRBC BLD-RTO: 0 PER 100 WBC
PHOSPHATE SERPL-MCNC: 2.6 MG/DL (ref 2.6–4.7)
PLATELET # BLD AUTO: 197 K/UL (ref 150–400)
PMV BLD AUTO: 10.5 FL (ref 8.9–12.9)
POTASSIUM SERPL-SCNC: 3.8 MMOL/L (ref 3.5–5.1)
PROT SERPL-MCNC: 5.8 G/DL (ref 6.4–8.2)
RBC # BLD AUTO: 2.44 M/UL (ref 3.8–5.2)
SERVICE CMNT-IMP: ABNORMAL
SODIUM SERPL-SCNC: 138 MMOL/L (ref 136–145)
WBC # BLD AUTO: 3.4 K/UL (ref 3.6–11)

## 2019-08-01 PROCEDURE — 74011636637 HC RX REV CODE- 636/637: Performed by: INTERNAL MEDICINE

## 2019-08-01 PROCEDURE — 74011250637 HC RX REV CODE- 250/637: Performed by: INTERNAL MEDICINE

## 2019-08-01 PROCEDURE — 84100 ASSAY OF PHOSPHORUS: CPT

## 2019-08-01 PROCEDURE — 85025 COMPLETE CBC W/AUTO DIFF WBC: CPT

## 2019-08-01 PROCEDURE — 74011250636 HC RX REV CODE- 250/636: Performed by: INTERNAL MEDICINE

## 2019-08-01 PROCEDURE — 65660000000 HC RM CCU STEPDOWN

## 2019-08-01 PROCEDURE — 82728 ASSAY OF FERRITIN: CPT

## 2019-08-01 PROCEDURE — 36415 COLL VENOUS BLD VENIPUNCTURE: CPT

## 2019-08-01 PROCEDURE — 80053 COMPREHEN METABOLIC PANEL: CPT

## 2019-08-01 PROCEDURE — 82962 GLUCOSE BLOOD TEST: CPT

## 2019-08-01 PROCEDURE — 83735 ASSAY OF MAGNESIUM: CPT

## 2019-08-01 PROCEDURE — 83036 HEMOGLOBIN GLYCOSYLATED A1C: CPT

## 2019-08-01 PROCEDURE — 74011000258 HC RX REV CODE- 258: Performed by: INTERNAL MEDICINE

## 2019-08-01 RX ORDER — METFORMIN HYDROCHLORIDE 500 MG/1
500 TABLET ORAL 2 TIMES DAILY WITH MEALS
Status: DISCONTINUED | OUTPATIENT
Start: 2019-08-01 | End: 2019-08-02

## 2019-08-01 RX ORDER — LANOLIN ALCOHOL/MO/W.PET/CERES
3 CREAM (GRAM) TOPICAL ONCE
Status: COMPLETED | OUTPATIENT
Start: 2019-08-01 | End: 2019-08-01

## 2019-08-01 RX ORDER — LOSARTAN POTASSIUM 50 MG/1
25 TABLET ORAL DAILY
Status: DISCONTINUED | OUTPATIENT
Start: 2019-08-01 | End: 2019-08-03 | Stop reason: HOSPADM

## 2019-08-01 RX ADMIN — INSULIN LISPRO 3 UNITS: 100 INJECTION, SOLUTION INTRAVENOUS; SUBCUTANEOUS at 23:00

## 2019-08-01 RX ADMIN — INSULIN LISPRO 2 UNITS: 100 INJECTION, SOLUTION INTRAVENOUS; SUBCUTANEOUS at 07:30

## 2019-08-01 RX ADMIN — HEPARIN SODIUM 5000 UNITS: 5000 INJECTION INTRAVENOUS; SUBCUTANEOUS at 06:05

## 2019-08-01 RX ADMIN — INSULIN LISPRO 5 UNITS: 100 INJECTION, SOLUTION INTRAVENOUS; SUBCUTANEOUS at 17:12

## 2019-08-01 RX ADMIN — Medication 10 ML: at 06:05

## 2019-08-01 RX ADMIN — Medication 1 CAPSULE: at 08:54

## 2019-08-01 RX ADMIN — CARVEDILOL 6.25 MG: 6.25 TABLET, FILM COATED ORAL at 17:12

## 2019-08-01 RX ADMIN — INSULIN LISPRO 3 UNITS: 100 INJECTION, SOLUTION INTRAVENOUS; SUBCUTANEOUS at 12:09

## 2019-08-01 RX ADMIN — HEPARIN SODIUM 5000 UNITS: 5000 INJECTION INTRAVENOUS; SUBCUTANEOUS at 15:10

## 2019-08-01 RX ADMIN — METFORMIN HYDROCHLORIDE 500 MG: 500 TABLET ORAL at 08:54

## 2019-08-01 RX ADMIN — MELATONIN TAB 3 MG 3 MG: 3 TAB at 22:58

## 2019-08-01 RX ADMIN — LEVOTHYROXINE SODIUM 75 MCG: 75 TABLET ORAL at 06:05

## 2019-08-01 RX ADMIN — LOSARTAN POTASSIUM 25 MG: 50 TABLET, FILM COATED ORAL at 12:09

## 2019-08-01 RX ADMIN — METFORMIN HYDROCHLORIDE 500 MG: 500 TABLET ORAL at 17:12

## 2019-08-01 RX ADMIN — MELATONIN TAB 3 MG 3 MG: 3 TAB at 01:27

## 2019-08-01 RX ADMIN — Medication 50 MG: at 12:09

## 2019-08-01 RX ADMIN — Medication 10 ML: at 14:00

## 2019-08-01 RX ADMIN — THERA TABS 1 TABLET: TAB at 12:09

## 2019-08-01 RX ADMIN — SERTRALINE HYDROCHLORIDE 100 MG: 50 TABLET ORAL at 22:59

## 2019-08-01 RX ADMIN — CARVEDILOL 6.25 MG: 6.25 TABLET, FILM COATED ORAL at 08:54

## 2019-08-01 RX ADMIN — Medication 10 ML: at 22:59

## 2019-08-01 RX ADMIN — PRAVASTATIN SODIUM 40 MG: 20 TABLET ORAL at 22:59

## 2019-08-01 RX ADMIN — CEFTRIAXONE SODIUM 1 G: 1 INJECTION, POWDER, FOR SOLUTION INTRAMUSCULAR; INTRAVENOUS at 01:17

## 2019-08-01 RX ADMIN — OMEGA-3 FATTY ACIDS CAP 1000 MG 1 CAPSULE: 1000 CAP at 08:54

## 2019-08-01 RX ADMIN — HEPARIN SODIUM 5000 UNITS: 5000 INJECTION INTRAVENOUS; SUBCUTANEOUS at 22:59

## 2019-08-01 NOTE — PROGRESS NOTES
1619:  Pt accepted by [facility]. For a qualifying stay pt will need 2 more nights here and may d/c on Saturday. Pt will have a private room. Vic William    1137:  Concerns were raised by her friend, Ara Escalona, who has also cared for her for the last 12 years since pt's  passed away. She stated the pt is no longer able to live alone and take care of herself, has had many falls and spends a lot of time in bed. Ara Escalona and her  have also transported her most of the time although she still owns a car. Pt has canceled all MD appointments in the last year claiming she is \"too weak\" to go. The pt has had help in the past but fired the help. Daughters live out of state. Christina has POA and lives in [place], [place].  Pt's friend, Ara Escalona, can transport her tomorrow. VIC William    1111:  Pt's friend, Ara Escalona, spoke with me. She stated she has not been hired to stay with the pt 24/7. Pt is agreeable to rehab. A referral was sent in [facility] to [facility]. She will need a w/c van at d/c. EMMA Leroy RN    CM Note:  Order for home health PT/OT noted. Met with pt who chose [facility]. A referral was sent in [address] [place] [address]. She has hired a private duty nurse for 24/7 care.   VIC William

## 2019-08-01 NOTE — DIABETES MGMT
[facility]    D[facility] Progress Note    Recommendations/ Comments: Chart reviewed for hyperglycemia. Noted improving renal function, Metformin resumed today.  mg/dL. If appropriate, please consider:  DTC will continue to monitor. Current hospital DM medication:   Lispro normal sensitivity correction scale  Metformin 500 mg BID     Chart reviewed on [address]. Patient is a 80 y.o. female with known Type 2 Diabetes on oral agent (monotherapy): metformin (generic) 500 mg BID at home. A1c:   Lab Results   Component Value Date/Time    Hemoglobin A1c 8.8 (H) 08/01/2019 05:20 AM    Hemoglobin A1c 6.9 (H) 02/02/2017 12:00 PM    Hemoglobin A1c, External 6.9 03/17/2016       Recent Glucose Results:   Lab Results   Component Value Date/Time     (H) 08/01/2019 05:20 AM    GLUCPOC 178 (H) 08/01/2019 07:12 AM    GLUCPOC 182 (H) 07/31/2019 09:05 PM    GLUCPOC 277 (H) 07/31/2019 03:40 PM        Lab Results   Component Value Date/Time    Creatinine 1.19 (H) 08/01/2019 05:20 AM     Estimated Creatinine Clearance: 30.1 mL/min (A) (based on SCr of 1.19 mg/dL (H)). Active Orders   Diet    DIET DIABETIC WITH OPTIONS Consistent Carb 2000kcal; Regular; 2 GM NA (House Low NA); AHA-LOW-CHOL FAT        PO intake: No data found. Will continue to follow as needed.     Thank you  Heath Dee RN  Office [phone]  Pager [phone]          Time spent: 5 min

## 2019-08-01 NOTE — CONSULTS
[facility] [place] [address]  [address] [facility], [address] [place] [address]  [facility]: [phone]  F: [phone]      Reason for Visit:   Sandhya Carlos is a 80 y.o. female who is seen in consultation at the request of Dr. Javier Glover for evaluation of macrocytic anemia. Hematology / Oncology Treatment History:   Ms. Anai Freire is a 80 y.o. female who has been seen in our clinic for evaluation of anemia. Medical history significant for acute on chronic decompensated CHF, HTN, Stage III CKD. For anemia, she has previously met with GI, who declined endoscopic evaluation due to age and comorbidities. Review of labs reveals Hgb was last normal in 2016, and has gradually declined to now 9.9 range with MCV ranging . No melena/hematochezia. History of Present Illness:   Ms. Anai Freire is a 81 y/o female admitted on 7/31/2019 3 day h/o weakness, dysuria, anemia. She had also had diarrhea at home for several days. ED workup revealed Hgb 10.8, Hct 31.3, Creat 1.61 and UA with bacteruria. Patient apologizes for missing our Hematology follow up appointment, stating she was not feeling well, was \"ailing at home. \" She felt she could not make it in due to diarrhea and weakness. Since admission, no further BMs. No fevers, chills, sweats. No melena/hematochezia/hematuria. She does feel weak. No family at bedside.        Past Medical History:   Diagnosis Date    Arthritis     Breast CA ([address]     Breast cancer ([address]     Diabetes ([address]     Diarrhea     chronic     Dyslipidemia     GERD (gastroesophageal reflux disease)     Hearing loss     Hypertension     LBBB (left bundle branch block)     chronic LBBB    Melanoma ([address]     Non-ischemic cardiomyopathy ([address]     Cath 2/16/15 - Mild (non-obstructive) CAD,LVEF 30%, 2+ MR    Seizures (HCC)     X1 post partum after first child was born- no more seizures    Skin cancer     melanoma on legs    Sleep apnea     wears O2 at night- cannot tolerate CPAP    Snoring     SOB (shortness of breath)     Systolic CHF [facility])     Admission 2/13/16    Thyroid disease       Past Surgical History:   Procedure Laterality Date    HX BREAST LUMPECTOMY  2003    right    HX CATARACT REMOVAL  1994    bilateral    HX CHOLECYSTECTOMY      HX DILATION AND CURETTAGE  1960    HX HEART CATHETERIZATION      x2- no blockages    HX HEENT  2004    surgery on upper jaw    HX HYSTERECTOMY  1967    HX HYSTEROSCOPY      HX OOPHORECTOMY  1967    left    HX ORTHOPAEDIC  2010    right knee replacement    HX ORTHOPAEDIC      repair torn meniscus left knee    HX ORTHOPAEDIC  1996    repair knuckle on left pinky finger    HX OTHER SURGICAL  2000    remove benign tumore right side between rib and right lung    HX PACEMAKER      HX PARTIAL THYROIDECTOMY      HX UROLOGICAL  2003    bladder surgery x2      Social History     Tobacco Use    Smoking status: Never Smoker    Smokeless tobacco: Never Used   Substance Use Topics    Alcohol use:  Yes     Alcohol/week: 4.0 standard drinks     Types: 3 Glasses of wine, 1 Shots of liquor per week      Family History   Problem Relation Age of Onset    Stroke Mother      Current Facility-Administered Medications   Medication Dose Route Frequency    metFORMIN (GLUCOPHAGE) tablet 500 mg  500 mg Oral BID WITH MEALS    losartan (COZAAR) tablet 25 mg  25 mg Oral DAILY    insulin lispro (HUMALOG) injection   SubCUTAneous AC&HS    glucose chewable tablet 16 g  4 Tab Oral PRN    glucagon (GLUCAGEN) injection 1 mg  1 mg IntraMUSCular PRN    dextrose 10% infusion 125-250 mL  125-250 mL IntraVENous PRN    sodium chloride (NS) flush 5-40 mL  5-40 mL IntraVENous Q8H    sodium chloride (NS) flush 5-40 mL  5-40 mL IntraVENous PRN    acetaminophen (TYLENOL) tablet 650 mg  650 mg Oral Q6H PRN    naloxone (NARCAN) injection 0.4 mg  0.4 mg IntraVENous PRN    cefTRIAXone (ROCEPHIN) 1 g in 0.9% sodium chloride (MBP/ADV) 50 mL  1 g IntraVENous Q24H    carvedilol (COREG) tablet 6.25 mg  6.25 mg Oral BID WITH MEALS    co-enzyme Q-10 (CO Q-10) capsule 50 mg  50 mg Oral PCL    omega 3-DHA-EPA-fish oil 1,000 mg (120 mg-180 mg) capsule 1 Cap  1 Cap Oral DAILY    levothyroxine (SYNTHROID) tablet 75 mcg  75 mcg Oral ACB    melatonin tablet 3 mg  3 mg Oral QHS    therapeutic multivitamin (THERAGRAN) tablet 1 Tab  1 Tab Oral PCL    pravastatin (PRAVACHOL) tablet 40 mg  40 mg Oral QHS    sertraline (ZOLOFT) tablet 100 mg  100 mg Oral QHS    lactobac ac& pc-s.therm-b.anim (RAFFY Q/RISAQUAD)  1 Cap Oral DAILY    heparin (porcine) injection 5,000 Units  5,000 Units SubCUTAneous Q8H      Allergies   Allergen Reactions    Beta-Blockers (Beta-Adrenergic Blocking Agts) Unknown (comments)     Made her feel tired, breakout on her back    Other Medication Unknown (comments)     Bromides    Phenobarbital Unknown (comments)    Sulfa (Sulfonamide Antibiotics) Swelling     rash        Review of Systems: A complete review of systems was obtained, negative except as described above. Physical Exam:     Visit Vitals  /75 (BP 1 Location: Left arm, BP Patient Position: At rest)   Pulse 75   Temp 98.4 °F (36.9 °C)   Resp 18   Ht 5' 4\" (1.626 m)   Wt 72.6 kg (160 lb)   SpO2 98%   Breastfeeding? No   BMI 27.46 kg/m²     ECOG PS: 2  General: elderly, frail, No distress  Eyes: PERRLA, anicteric sclerae  HENT: Atraumatic with normal appearance of ears and nose; OP clear  Neck: Supple; no thyromegaly   Lymphatic: No cervical, supraclavicular, or axillary adenopathy  Respiratory: CTAB, normal respiratory effort  CV: Normal rate, regular rhythm, no murmurs, no peripheral edema  GI: Soft, nontender, nondistended, no masses, no hepatomegaly, no splenomegaly  MS:  Digits without clubbing or cyanosis. Skin: No rashes, ecchymoses, or petechiae. Normal temperature, turgor, and texture.   Neuro/Psych: Alert, oriented, appropriate affect, normal judgment/insight      Results:     Lab Results   Component Value Date/Time    WBC 3.4 (L) 08/01/2019 05:20 AM    HGB 8.5 (L) 08/01/2019 05:20 AM    HCT 24.9 (L) 08/01/2019 05:20 AM    PLATELET 806 05/01/1433 05:20 AM    .0 (H) 08/01/2019 05:20 AM    ABS. NEUTROPHILS 1.7 (L) 08/01/2019 05:20 AM    Hemoglobin (POC) 12.6 01/15/2014 12:49 PM    Hematocrit (POC) 37 01/15/2014 12:49 PM     Lab Results   Component Value Date/Time    Sodium 138 08/01/2019 05:20 AM    Potassium 3.8 08/01/2019 05:20 AM    Chloride 109 (H) 08/01/2019 05:20 AM    CO2 24 08/01/2019 05:20 AM    Glucose 177 (H) 08/01/2019 05:20 AM    BUN 26 (H) 08/01/2019 05:20 AM    Creatinine 1.19 (H) 08/01/2019 05:20 AM    GFR est AA 52 (L) 08/01/2019 05:20 AM    GFR est non-AA 43 (L) 08/01/2019 05:20 AM    Calcium 7.8 (L) 08/01/2019 05:20 AM    Sodium (POC) 137 01/15/2014 12:49 PM    Potassium (POC) 3.7 01/15/2014 12:49 PM    Chloride (POC) 106 01/15/2014 12:49 PM    Glucose (POC) 178 (H) 08/01/2019 07:12 AM    BUN (POC) 24 (H) 01/15/2014 12:49 PM    Creatinine (POC) 1.0 01/15/2014 12:49 PM    Calcium, ionized (POC) 1.14 01/15/2014 12:49 PM     Lab Results   Component Value Date/Time    Bilirubin, total 0.3 08/01/2019 05:20 AM    ALT (SGPT) 15 08/01/2019 05:20 AM    AST (SGOT) 13 (L) 08/01/2019 05:20 AM    Alk.  phosphatase 58 08/01/2019 05:20 AM    Protein, total 5.8 (L) 08/01/2019 05:20 AM    Albumin 2.6 (L) 08/01/2019 05:20 AM    Globulin 3.2 08/01/2019 05:20 AM     Lab Results   Component Value Date/Time    Reticulocyte count 3.2 (H) 06/06/2019 02:42 PM    Iron % saturation 28 06/06/2019 02:42 PM    TIBC 395 06/06/2019 02:42 PM    Ferritin 54 08/01/2019 05:20 AM    Vitamin B12 487 07/31/2019 12:52 AM    Folate 50.9 (H) 07/31/2019 12:52 AM    Haptoglobin 105 06/06/2019 02:42 PM    Erythropoietin 109.6 (H) 06/06/2019 02:42 PM     (H) 06/06/2019 02:42 PM    Sed rate (ESR) 85 (H) 10/29/2010 06:30 AM    TSH 1.79 07/31/2019 12:32 AM    M-Ishmael Not Observed 06/06/2019 02:42 PM     Lab Results   Component Value Date/Time    INR 1.0 08/08/2016 03:56 PM    aPTT 28.5 10/15/2010 11:00 AM     Lab Results   Component Value Date/Time     (H) 06/06/2019 02:42 PM     7/3119 XR CHEST   IMPRESSION:  1. Chronic appearing lung changes with scarring in the left midlung. No definite  acute process. Assessment and Recommendations:   Riaz Martinez is a 80 y.o. female with CHF, CKD, HTN admitted with UTI and anemia. 1. Macrocytic anemia: Was worsening gradually over the last 2 years since 2/2017. Recently seen in our clinic in 6/2019 for anemia workup. She missed follow up appt to review labs due to feeling sick with diarrhea. Normal VitB12, folate, TSH, iron profile, ferritin. Although LDH elevated, haptoglobin was normal which rules out hemolysis. This is likely anemia of chronic disease related to CKD or uncontrolled DM. Further decline in Hgb is likely dilutional and related to infection. EPO and retic elevated indicating some response to anemia, but inadequate. SPEP negative, kappa light chains mildly elevated and will need to be repeated in 3 months. -- No inpatient recommendations. -- Will ensure she has follow up established in our clinic; Appt listed in discharge summary     2. UTI: Urine culture pending. On Ceftriaxone and hospitalist managing. 3. Diarrhea: Now resolved. C. Diff and stool cultures pending. 4. Weakness: 2/2 to UTI as well as multiple comorbidities of CHF, DM and anemia. -- PT/OT eval, likely going to SNF. 5. Thyroid disease: On Synthroid, normal TSH. Patient seen in conjunction with Inder Parsons NP.     Signed By: Noelle Cullen MD

## 2019-08-01 NOTE — PROGRESS NOTES
Bedside and Verbal shift change report given to Marialuisa Anne (oncoming nurse) by Maxx Workman (offgoing nurse). Report included the following information SBAR, Kardex, Intake/Output, MAR, Recent Results and Cardiac Rhythm Paced.

## 2019-08-01 NOTE — PROGRESS NOTES
Bedside shift change report given to Melania Martínez RN (oncoming nurse) by Gisselle Barragan RN (offgoing nurse). Report included the following information SBAR and Kardex.

## 2019-08-02 LAB
ANION GAP SERPL CALC-SCNC: 8 MMOL/L (ref 5–15)
BACTERIA SPEC CULT: ABNORMAL
BASOPHILS # BLD: 0 K/UL (ref 0–0.1)
BASOPHILS NFR BLD: 1 % (ref 0–1)
BNP SERPL-MCNC: 1666 PG/ML
BUN SERPL-MCNC: 25 MG/DL (ref 6–20)
BUN/CREAT SERPL: 23 (ref 12–20)
CALCIUM SERPL-MCNC: 8 MG/DL (ref 8.5–10.1)
CC UR VC: ABNORMAL
CHLORIDE SERPL-SCNC: 108 MMOL/L (ref 97–108)
CO2 SERPL-SCNC: 21 MMOL/L (ref 21–32)
CREAT SERPL-MCNC: 1.11 MG/DL (ref 0.55–1.02)
DIFFERENTIAL METHOD BLD: ABNORMAL
EOSINOPHIL # BLD: 0.2 K/UL (ref 0–0.4)
EOSINOPHIL NFR BLD: 5 % (ref 0–7)
ERYTHROCYTE [DISTWIDTH] IN BLOOD BY AUTOMATED COUNT: 14.8 % (ref 11.5–14.5)
GLUCOSE BLD STRIP.AUTO-MCNC: 170 MG/DL (ref 65–100)
GLUCOSE BLD STRIP.AUTO-MCNC: 185 MG/DL (ref 65–100)
GLUCOSE BLD STRIP.AUTO-MCNC: 200 MG/DL (ref 65–100)
GLUCOSE BLD STRIP.AUTO-MCNC: 246 MG/DL (ref 65–100)
GLUCOSE SERPL-MCNC: 168 MG/DL (ref 65–100)
HCT VFR BLD AUTO: 25.4 % (ref 35–47)
HGB BLD-MCNC: 8.5 G/DL (ref 11.5–16)
IMM GRANULOCYTES # BLD AUTO: 0 K/UL (ref 0–0.04)
IMM GRANULOCYTES NFR BLD AUTO: 1 % (ref 0–0.5)
LYMPHOCYTES # BLD: 1.3 K/UL (ref 0.8–3.5)
LYMPHOCYTES NFR BLD: 35 % (ref 12–49)
MAGNESIUM SERPL-MCNC: 2 MG/DL (ref 1.6–2.4)
MCH RBC QN AUTO: 34.3 PG (ref 26–34)
MCHC RBC AUTO-ENTMCNC: 33.5 G/DL (ref 30–36.5)
MCV RBC AUTO: 102.4 FL (ref 80–99)
MONOCYTES # BLD: 0.3 K/UL (ref 0–1)
MONOCYTES NFR BLD: 8 % (ref 5–13)
NEUTS SEG # BLD: 1.9 K/UL (ref 1.8–8)
NEUTS SEG NFR BLD: 50 % (ref 32–75)
NRBC # BLD: 0 K/UL (ref 0–0.01)
NRBC BLD-RTO: 0 PER 100 WBC
PLATELET # BLD AUTO: 208 K/UL (ref 150–400)
PMV BLD AUTO: 10.9 FL (ref 8.9–12.9)
POTASSIUM SERPL-SCNC: 3.9 MMOL/L (ref 3.5–5.1)
RBC # BLD AUTO: 2.48 M/UL (ref 3.8–5.2)
SERVICE CMNT-IMP: ABNORMAL
SODIUM SERPL-SCNC: 137 MMOL/L (ref 136–145)
WBC # BLD AUTO: 3.7 K/UL (ref 3.6–11)

## 2019-08-02 PROCEDURE — 77030027138 HC INCENT SPIROMETER -A

## 2019-08-02 PROCEDURE — 36415 COLL VENOUS BLD VENIPUNCTURE: CPT

## 2019-08-02 PROCEDURE — 97530 THERAPEUTIC ACTIVITIES: CPT

## 2019-08-02 PROCEDURE — 74011636637 HC RX REV CODE- 636/637: Performed by: INTERNAL MEDICINE

## 2019-08-02 PROCEDURE — 97116 GAIT TRAINING THERAPY: CPT

## 2019-08-02 PROCEDURE — 83735 ASSAY OF MAGNESIUM: CPT

## 2019-08-02 PROCEDURE — 97535 SELF CARE MNGMENT TRAINING: CPT

## 2019-08-02 PROCEDURE — 74011250637 HC RX REV CODE- 250/637: Performed by: INTERNAL MEDICINE

## 2019-08-02 PROCEDURE — 65660000000 HC RM CCU STEPDOWN

## 2019-08-02 PROCEDURE — 74011250636 HC RX REV CODE- 250/636: Performed by: INTERNAL MEDICINE

## 2019-08-02 PROCEDURE — 82962 GLUCOSE BLOOD TEST: CPT

## 2019-08-02 PROCEDURE — 83880 ASSAY OF NATRIURETIC PEPTIDE: CPT

## 2019-08-02 PROCEDURE — 74011000258 HC RX REV CODE- 258: Performed by: INTERNAL MEDICINE

## 2019-08-02 PROCEDURE — 80048 BASIC METABOLIC PNL TOTAL CA: CPT

## 2019-08-02 PROCEDURE — 85025 COMPLETE CBC W/AUTO DIFF WBC: CPT

## 2019-08-02 PROCEDURE — 97110 THERAPEUTIC EXERCISES: CPT

## 2019-08-02 RX ORDER — METFORMIN HYDROCHLORIDE 500 MG/1
1000 TABLET ORAL 2 TIMES DAILY WITH MEALS
Status: DISCONTINUED | OUTPATIENT
Start: 2019-08-02 | End: 2019-08-03 | Stop reason: HOSPADM

## 2019-08-02 RX ORDER — DOCUSATE SODIUM 100 MG/1
100 CAPSULE, LIQUID FILLED ORAL DAILY
Status: DISCONTINUED | OUTPATIENT
Start: 2019-08-02 | End: 2019-08-03 | Stop reason: HOSPADM

## 2019-08-02 RX ORDER — BUMETANIDE 1 MG/1
1 TABLET ORAL DAILY
Status: DISCONTINUED | OUTPATIENT
Start: 2019-08-03 | End: 2019-08-03 | Stop reason: HOSPADM

## 2019-08-02 RX ADMIN — OMEGA-3 FATTY ACIDS CAP 1000 MG 1 CAPSULE: 1000 CAP at 08:26

## 2019-08-02 RX ADMIN — PRAVASTATIN SODIUM 40 MG: 20 TABLET ORAL at 21:18

## 2019-08-02 RX ADMIN — Medication 50 MG: at 12:59

## 2019-08-02 RX ADMIN — Medication 10 ML: at 21:19

## 2019-08-02 RX ADMIN — LOSARTAN POTASSIUM 25 MG: 50 TABLET, FILM COATED ORAL at 08:25

## 2019-08-02 RX ADMIN — LEVOTHYROXINE SODIUM 75 MCG: 75 TABLET ORAL at 06:36

## 2019-08-02 RX ADMIN — METFORMIN HYDROCHLORIDE 500 MG: 500 TABLET ORAL at 08:25

## 2019-08-02 RX ADMIN — CARVEDILOL 6.25 MG: 6.25 TABLET, FILM COATED ORAL at 16:31

## 2019-08-02 RX ADMIN — Medication 10 ML: at 06:36

## 2019-08-02 RX ADMIN — METFORMIN HYDROCHLORIDE 1000 MG: 500 TABLET ORAL at 16:31

## 2019-08-02 RX ADMIN — DOCUSATE SODIUM 100 MG: 100 CAPSULE, LIQUID FILLED ORAL at 09:24

## 2019-08-02 RX ADMIN — HEPARIN SODIUM 5000 UNITS: 5000 INJECTION INTRAVENOUS; SUBCUTANEOUS at 06:36

## 2019-08-02 RX ADMIN — HEPARIN SODIUM 5000 UNITS: 5000 INJECTION INTRAVENOUS; SUBCUTANEOUS at 21:18

## 2019-08-02 RX ADMIN — THERA TABS 1 TABLET: TAB at 12:59

## 2019-08-02 RX ADMIN — Medication 1 CAPSULE: at 08:26

## 2019-08-02 RX ADMIN — Medication 10 ML: at 13:00

## 2019-08-02 RX ADMIN — SERTRALINE HYDROCHLORIDE 100 MG: 50 TABLET ORAL at 21:18

## 2019-08-02 RX ADMIN — CEFTRIAXONE SODIUM 1 G: 1 INJECTION, POWDER, FOR SOLUTION INTRAMUSCULAR; INTRAVENOUS at 01:27

## 2019-08-02 RX ADMIN — INSULIN LISPRO 2 UNITS: 100 INJECTION, SOLUTION INTRAVENOUS; SUBCUTANEOUS at 08:26

## 2019-08-02 RX ADMIN — INSULIN LISPRO 3 UNITS: 100 INJECTION, SOLUTION INTRAVENOUS; SUBCUTANEOUS at 12:17

## 2019-08-02 RX ADMIN — HEPARIN SODIUM 5000 UNITS: 5000 INJECTION INTRAVENOUS; SUBCUTANEOUS at 13:00

## 2019-08-02 RX ADMIN — INSULIN LISPRO 3 UNITS: 100 INJECTION, SOLUTION INTRAVENOUS; SUBCUTANEOUS at 16:31

## 2019-08-02 RX ADMIN — CARVEDILOL 6.25 MG: 6.25 TABLET, FILM COATED ORAL at 08:26

## 2019-08-02 RX ADMIN — MELATONIN TAB 3 MG 3 MG: 3 TAB at 21:18

## 2019-08-02 NOTE — PROGRESS NOTES
Bedside shift change report given to Casi Wisdom (oncoming nurse) by Emy Gómez (offgoing nurse).  Report included the following information SBAR, Kardex, [facility] and Recent Results

## 2019-08-02 NOTE — DIABETES MGMT
[facility]    [facility] Progress Note    Recommendations/ Comments: Chart reviewed for hyperglycemia, noting postprandial excursions above 200 mg/dL.  mg/dL today. Noted code status. If appropriate, please consider:  1. DTC will continue to monitor. May consider basal insulin or an oral medication for prandial BG management if desiring tighter glycemic control. Current hospital DM medication:   Lispro normal sensitivity correction scale  Metformin 500 mg BID     Chart reviewed on [address]. Patient is a 80 y.o. female with known Type 2 Diabetes on oral agent (monotherapy): metformin (generic) 500 mg BID at home. A1c:   Lab Results   Component Value Date/Time    Hemoglobin A1c 8.8 (H) 08/01/2019 05:20 AM    Hemoglobin A1c 6.9 (H) 02/02/2017 12:00 PM    Hemoglobin A1c, External 6.9 03/17/2016       Recent Glucose Results:   Lab Results   Component Value Date/Time     (H) 08/02/2019 03:50 AM    GLUCPOC 185 (H) 08/02/2019 06:57 AM    GLUCPOC 295 (H) 08/01/2019 09:19 PM    GLUCPOC 261 (H) 08/01/2019 04:17 PM        Lab Results   Component Value Date/Time    Creatinine 1.11 (H) 08/02/2019 03:50 AM     Estimated Creatinine Clearance: 33.8 mL/min (A) (based on SCr of 1.11 mg/dL (H)). Active Orders   Diet    DIET DIABETIC WITH OPTIONS Consistent Carb 2000kcal; Regular; 2 GM NA (House Low NA); AHA-LOW-CHOL FAT        PO intake:   Patient Vitals for the past 72 hrs:   % Diet Eaten   08/02/19 0829 80 %       Will continue to follow as needed.     Thank you  Claudia Brady, RN  Office [phone]  Pager [phone]          Time spent: 5 min

## 2019-08-02 NOTE — PROGRESS NOTES
[facility] [address]  [address] [place] [address]  [phone]      Medical Progress Note      NAME: Shan Aopdaca   :  1928  MRM:  [id number]    Date/Time: 2019          Subjective:     Chief Complaint:  \"I feel good\"     Pt seen and examined. States no diarrhea. ROS:  (bold if positive, if negative)           Objective:       Vitals:          Last 24hrs VS reviewed since prior progress note. Most recent are:  Temp 98.4  HR 75  /75    RR 18  98% RA    Exam:     Physical Exam:    Gen:  Well-developed, well-nourished, in no acute distress  HEENT:  Pink conjunctivae, PERRL, hearing intact to voice, moist mucous membranes  Neck:  Supple, without masses, thyroid non-tender  Resp:  No accessory muscle use, clear breath sounds without wheezes rales or rhonchi  Card:  No murmurs, normal S1, S2 without thrills, bruits or peripheral edema  Abd:  Soft, non-tender, non-distended, normoactive bowel sounds are present  Musc:  No cyanosis or clubbing  Skin:  No rashes or ulcers, skin turgor is good  Neuro:  Cranial nerves 3-12 are grossly intact,  strength is 5/5 bilaterally and dorsi / plantarflexion is 5/5 bilaterally, follows commands appropriately  Psych:  Moderate insight     Medications Reviewed: (see below)    Lab Data Reviewed: (see below)    ______________________________________________________________________    Medications:     Current Facility-Administered Medications   Medication Dose Route Frequency    docusate sodium (COLACE) capsule 100 mg  100 mg Oral DAILY    [START ON 8/3/2019] bumetanide (BUMEX) tablet 1 mg  1 mg Oral DAILY    metFORMIN (GLUCOPHAGE) tablet 1,000 mg  1,000 mg Oral BID WITH MEALS    losartan (COZAAR) tablet 25 mg  25 mg Oral DAILY    insulin lispro (HUMALOG) injection   SubCUTAneous AC&HS    glucose chewable tablet 16 g  4 Tab Oral PRN    glucagon (GLUCAGEN) injection 1 mg  1 mg IntraMUSCular PRN    dextrose 10% infusion 125-250 mL  125-250 mL IntraVENous PRN    sodium chloride (NS) flush 5-40 mL  5-40 mL IntraVENous Q8H    sodium chloride (NS) flush 5-40 mL  5-40 mL IntraVENous PRN    acetaminophen (TYLENOL) tablet 650 mg  650 mg Oral Q6H PRN    naloxone (NARCAN) injection 0.4 mg  0.4 mg IntraVENous PRN    cefTRIAXone (ROCEPHIN) 1 g in 0.9% sodium chloride (MBP/ADV) 50 mL  1 g IntraVENous Q24H    carvedilol (COREG) tablet 6.25 mg  6.25 mg Oral BID WITH MEALS    co-enzyme Q-10 (CO Q-10) capsule 50 mg  50 mg Oral PCL    omega 3-DHA-EPA-fish oil 1,000 mg (120 mg-180 mg) capsule 1 Cap  1 Cap Oral DAILY    levothyroxine (SYNTHROID) tablet 75 mcg  75 mcg Oral ACB    melatonin tablet 3 mg  3 mg Oral QHS    therapeutic multivitamin (THERAGRAN) tablet 1 Tab  1 Tab Oral PCL    pravastatin (PRAVACHOL) tablet 40 mg  40 mg Oral QHS    sertraline (ZOLOFT) tablet 100 mg  100 mg Oral QHS    lactobac ac& pc-s.therm-b.anim (RAFFY Q/RISAQUAD)  1 Cap Oral DAILY    heparin (porcine) injection 5,000 Units  5,000 Units SubCUTAneous Q8H            Lab Review:     Recent Labs     08/02/19  0350 08/01/19  0520 07/31/19  0032   WBC 3.7 3.4* 4.0   HGB 8.5* 8.5* 10.8*   HCT 25.4* 24.9* 31.3*    197 188     Recent Labs     08/02/19  0350 08/01/19  0520 07/31/19  0032    138 134*   K 3.9 3.8 4.2    109* 100   CO2 21 24 21   * 177* 292*   BUN 25* 26* 37*   CREA 1.11* 1.19* 1.61*   CA 8.0* 7.8* 9.3   MG 2.0 1.8  --    PHOS  --  2.6  --    ALB  --  2.6* 3.3*   SGOT  --  13* 22   ALT  --  15 18     No components found for: GLPOC         Assessment / Plan:     Weakness: generalized; due to UTI and diarrhea.   -placement pending   -PT/OT on board        UTI: symptomatic, and evident on UA  -on Ceftriaxone; cultures pending         Diarrhea: resolving   -monitor        JENN: mild. Likely from IVVD from diarrhea.  Resolving  -resume meds at needed        HFrEF (heart failure with reduced ejection fraction) / Biventricular automatic implantable cardioverter defibrillator in situ: recent EF improved to 45%  -resume ARB and bumex as needed        Hypertension (): BP controlled. -continue coreg  -resume ARB once BP and Cr stable        Sleep apnea (): Overview: wears O2 at night- cannot tolerate CPAP       DM type 2 (diabetes mellitus, type 2) with nephropathy: A1c markedly elevated at 8.8   -on metformin; can increase once Cr stable. May benefit from [address] as well however would avoid insulin as suspect pt NOT emilie enough to safely administer       Anemia: macrocytic  -heme/onc consulted        Thyroid disease:  -TSH WNL.  Continue levothyroxine        Breast CA: outpatient follow up     Code Status: DNR    Total time spent with patient: [address] discussed with: Patient and Family    Discussed:  Code Status, Care Plan and D/C Planning    Prophylaxis:  Hep SQ    Disposition:  TBD; ?SNF   ______________________________________    Attending Physician: Brii Small MD

## 2019-08-02 NOTE — PROGRESS NOTES
CM Note:  DMAS 95 and CXR report [facility]. [facility]. She will need w/c transport.   VIC William

## 2019-08-02 NOTE — PROGRESS NOTES
Problem: Self Care Deficits Care Plan (Adult)  Goal: *Acute Goals and Plan of Care (Insert Text)  Description    FUNCTIONAL STATUS PRIOR TO ADMISSION: Patient was independent and active without use of DME.    HOME SUPPORT: The patient lived alone with friend to provide assistance. She reports will hire as caregiver. Occupational Therapy Goals  Initiated 7/31/2019  1. Patient will perform lower body dressing with modified independence within 7 day(s). 2.  Patient will perform grooming with modified independence within 7 day(s). 3.  Patient will perform toilet transfers with modified independence within 7 day(s). 4.  Patient will perform all aspects of toileting with modified independence within 7 day(s). 5.  Patient will participate in upper extremity therapeutic exercise/activities with independence for 10 minutes within 7 day(s). Outcome: Progressing Towards Goal   OCCUPATIONAL THERAPY TREATMENT  Patient: Seun Lin (12 y.o. female)  Date: 8/2/2019  Diagnosis: Weakness [R53.1] <principal problem not specified>       Precautions: Fall  Chart, occupational therapy assessment, plan of care, and goals were reviewed. ASSESSMENT:  Pt stated she \"felt clear today in her head. \" Pt agreeable to OT. She was able to doff/don socks long sitting in bed using cross legged method. Supine to sit and pt stated \" I need to change my pad. \" Pt directed therapist as to where her brief and pads are located and ambulated to bathroom for toileting. She transferred to [facility], doffed/donned her briefs and changed her pads, cross legged method. Pt able to perform her own bladder hygiene. RN stated pt is off contact precautions. Pt returned to sit in chair and engaged with 2 sets of UE exercises without difficulty. Good progress, recommend SNF for rehab. Progression toward goals:  ?       Improving appropriately and progressing toward goals  ? Improving slowly and progressing toward goals  ?        Not making progress toward goals and plan of care will be adjusted     PLAN:  Patient continues to benefit from skilled intervention to address the above impairments. Continue treatment per established plan of care. Discharge Recommendations:  SNF for rehab  Further Equipment Recommendations for Discharge: None     SUBJECTIVE:   Patient stated I can do a little more    OBJECTIVE DATA SUMMARY:   Cognitive/Behavioral Status:  Neurologic State: Alert;Eyes open spontaneously  Orientation Level: Oriented X4  Cognition: Follows commands             Functional Mobility and Transfers for ADLs:  Bed Mobility:  Supine to Sit: Stand-by assistance    Transfers:  Sit to Stand: Contact guard assistance  Functional Transfers  Toilet Transfer : Contact guard assistance  Adaptive Equipment: Grab bars; Walker (comment)       Balance:  Sitting: Intact  Standing: With support    ADL Intervention:       Lower Body Dressing Assistance  Socks: Set-up(long sitting in bed)  Leg Crossed Method Used: Yes  Position Performed: Long sitting on bed    Toileting  Bladder Hygiene: Contact guard assistance     Therapeutic Exercises:     EXERCISE   Sets   Reps   Active Active Assist   Passive   Comments   Finger flex/ext 2 10 ?           ?           ? Wrist flex/ext 2 10 ?           ?           ? Forearm supination/pronation 2 10 ?           ?           ? Chest presses 2 10 ?           ?           ? Shoulder ab/add 2 10 ?           ?           ?               Shoulder flex/ext 2 10 ?           ?           ?                 ?           ?           ?                 ?           ?           ?                 ?           ?           ?                 ?           ?           ?                 ?           ?           ?                 Pain:  Pain Scale 1: Numeric (0 - 10)  Pain Intensity 1: 0              Activity Tolerance:   No signs/symptoms of distress or discomfort during OT  Please refer to the flowsheet for vital signs taken during this treatment. After treatment:   ? Patient left in no apparent distress sitting up in chair  ? Patient left in no apparent distress in bed  ? Call bell left within reach  ? Nursing notified  ? Caregiver present  ?  Bed alarm activated    COMMUNICATION/COLLABORATION:   The patients plan of care was discussed with: Occupational Therapist and Registered Nurse    AMADA Carver  Time Calculation: 25 mins

## 2019-08-02 NOTE — PROGRESS NOTES
Bedside and Verbal shift change report given to Hugo Coles RN (oncoming nurse) by Palma Rosen RN (offgoing nurse). Report included the following information SBAR, Kardex, MAR and [employer].

## 2019-08-02 NOTE — PROGRESS NOTES
Spiritual Care Partner Volunteer visited patient in the [facility]. Surgical unit on 8/2/19  Documented by:  Korey Chavarria. Mat Walton.      Paging Service: [phone])

## 2019-08-02 NOTE — PROGRESS NOTES
Bedside shift change report given to Max (oncoming nurse) by Deepti Power (offgoing nurse). Report included the following information SBAR, Kardex, MAR and Recent Results.

## 2019-08-02 NOTE — PROGRESS NOTES
Problem: Mobility Impaired (Adult and Pediatric)  Goal: *Acute Goals and Plan of Care (Insert Text)  Description  Physical Therapy Goals  Initiated 7/31/2019  1. Patient will move from supine to sit and sit to supine , scoot up and down and roll side to side in bed with modified independence within 7 day(s). 2.  Patient will transfer from bed to chair and chair to bed with modified independence using the least restrictive device within 7 day(s). 3.  Patient will perform sit to stand with modified independence within 7 day(s). 4.  Patient will ambulate with modified independence for 100 feet with the least restrictive device within 7 day(s). Outcome: Progressing Towards Goal   PHYSICAL THERAPY TREATMENT  Patient: Lucretia Doshi (69 y.o. female)  Date: 8/2/2019  Diagnosis: Weakness [R53.1] <principal problem not specified>       Precautions: Fall  Chart, physical therapy assessment, plan of care and goals were reviewed. ASSESSMENT:  Patient eager to work with therapy and demonstrating significant improvement in activity tolerance. Total time on her feet today was >20 minutes with gait x 220', then stopped to make coffee, then another 220', then returned to room. Patient completed toileting in bathroom with occasional cues needed to improve safety and for clothing management strategy (assist for brief donning/pad change). Occasional cues needed with transfers as tends to not quite reach chair fully and sometimes forgets to use arms on chair for control. SpO2 stable in high 90s on RA during session. Will cont to benefit from skilled PT. Recommend up and walking with staff at least twice daily and cont to recommend SNF to maximize safety and indep prior to d/c back to [facility]. Progression toward goals:  ?    Improving appropriately and progressing toward goals  ? Improving slowly and progressing toward goals  ?     Not making progress toward goals and plan of care will be adjusted     PLAN:  Patient continues to benefit from skilled intervention to address the above impairments. Continue treatment per established plan of care. Discharge Recommendations:  Tyson Juarez  Further Equipment Recommendations for Discharge:  tbd      SUBJECTIVE:   Patient stated Rai Holcomb we walk another lap? Juan Jacqueline    OBJECTIVE DATA SUMMARY:   Critical Behavior:  Neurologic State: Alert, Eyes open spontaneously  Orientation Level: Oriented X4  Cognition: Follows commands  Safety/Judgement: Awareness of environment  Functional Mobility Training:  Bed Mobility:     Supine to Sit: (received up in chair)              Transfers:  Sit to Stand: Contact guard assistance  Stand to Sit: Contact guard assistance                             Balance:  Sitting: Intact  Standing: Intact; With support  Ambulation/Gait Training:  Distance (ft): 440 Feet (ft)  Assistive Device: Gait belt;Walker, rolling  Ambulation - Level of Assistance: Contact guard assistance        Gait Abnormalities: Decreased step clearance                                        Pain:  Pain Scale 1: Numeric (0 - 10)  Pain Intensity 1: 0              Activity Tolerance:   Good - 420' feet of gait and up on feet >20 min    Please refer to the flowsheet for vital signs taken during this treatment. After treatment:   ?    Patient left in no apparent distress sitting up in chair  ? Patient left in no apparent distress in bed  ? Call bell left within reach  ? Nursing notified  ? Caregiver present  ?     Bed alarm activated    COMMUNICATION/COLLABORATION:   The patients plan of care was discussed with: Registered Nurse    David Sigala, PT   Time Calculation: 30 mins

## 2019-08-02 NOTE — PROGRESS NOTES
[facility] [address]  [address] [place] [address]  [phone]      Medical Progress Note      NAME: Isabella Cornejo   :  1928  MRM:  [id number]    Date/Time: 2019  12:12 PM         Subjective:     Chief Complaint:  \"I feel good\"     Pt seen and examined. States no diarrhea. In fact now no BM since . Tolerating diet     ROS:  (bold if positive, if negative)           Objective:       Vitals:          Last 24hrs VS reviewed since prior progress note. Most recent are:    Visit Vitals  /64 (BP 1 Location: Right arm, BP Patient Position: At rest;Sitting)   Pulse 69   Temp 98 °F (36.7 °C)   Resp 16   Ht 5' 4\" (1.626 m)   Wt 80.1 kg (176 lb 9.6 oz)   SpO2 98%   Breastfeeding? No   BMI 30.31 kg/m²     SpO2 Readings from Last 6 Encounters:   19 98%   19 97%   19 95%   19 96%   18 96%   18 97%            Intake/Output Summary (Last 24 hours) at 2019 1212  Last data filed at 2019 4870  Gross per 24 hour   Intake 180 ml   Output    Net 180 ml          Exam:     Physical Exam:    Gen:  Well-developed, well-nourished, in no acute distress  HEENT:  Pink conjunctivae, PERRL, hearing intact to voice, moist mucous membranes  Neck:  Supple, without masses, thyroid non-tender  Resp:  No accessory muscle use, clear breath sounds without wheezes rales or rhonchi  Card:  No murmurs, normal S1, S2 without thrills, bruits or peripheral edema  Abd:  Soft, non-tender, non-distended, normoactive bowel sounds are present  Musc:  No cyanosis or clubbing  Skin:  No rashes or ulcers, skin turgor is good  Neuro:  Cranial nerves 3-12 are grossly intact,  strength is 5/5 bilaterally and dorsi / plantarflexion is 5/5 bilaterally, follows commands appropriately  Psych:  Moderate insight     Medications Reviewed: (see below)    Lab Data Reviewed: (see below)    ______________________________________________________________________    Medications: Current Facility-Administered Medications   Medication Dose Route Frequency    docusate sodium (COLACE) capsule 100 mg  100 mg Oral DAILY    [START ON 8/3/2019] bumetanide (BUMEX) tablet 1 mg  1 mg Oral DAILY    metFORMIN (GLUCOPHAGE) tablet 500 mg  500 mg Oral BID WITH MEALS    losartan (COZAAR) tablet 25 mg  25 mg Oral DAILY    insulin lispro (HUMALOG) injection   SubCUTAneous AC&HS    glucose chewable tablet 16 g  4 Tab Oral PRN    glucagon (GLUCAGEN) injection 1 mg  1 mg IntraMUSCular PRN    dextrose 10% infusion 125-250 mL  125-250 mL IntraVENous PRN    sodium chloride (NS) flush 5-40 mL  5-40 mL IntraVENous Q8H    sodium chloride (NS) flush 5-40 mL  5-40 mL IntraVENous PRN    acetaminophen (TYLENOL) tablet 650 mg  650 mg Oral Q6H PRN    naloxone (NARCAN) injection 0.4 mg  0.4 mg IntraVENous PRN    cefTRIAXone (ROCEPHIN) 1 g in 0.9% sodium chloride (MBP/ADV) 50 mL  1 g IntraVENous Q24H    carvedilol (COREG) tablet 6.25 mg  6.25 mg Oral BID WITH MEALS    co-enzyme Q-10 (CO Q-10) capsule 50 mg  50 mg Oral PCL    omega 3-DHA-EPA-fish oil 1,000 mg (120 mg-180 mg) capsule 1 Cap  1 Cap Oral DAILY    levothyroxine (SYNTHROID) tablet 75 mcg  75 mcg Oral ACB    melatonin tablet 3 mg  3 mg Oral QHS    therapeutic multivitamin (THERAGRAN) tablet 1 Tab  1 Tab Oral PCL    pravastatin (PRAVACHOL) tablet 40 mg  40 mg Oral QHS    sertraline (ZOLOFT) tablet 100 mg  100 mg Oral QHS    lactobac ac& pc-s.therm-b.anim (RAFFY Q/RISAQUAD)  1 Cap Oral DAILY    heparin (porcine) injection 5,000 Units  5,000 Units SubCUTAneous Q8H            Lab Review:     Recent Labs     08/02/19  0350 08/01/19  0520 07/31/19  0032   WBC 3.7 3.4* 4.0   HGB 8.5* 8.5* 10.8*   HCT 25.4* 24.9* 31.3*    197 188     Recent Labs     08/02/19  0350 08/01/19  0520 07/31/19  0032    138 134*   K 3.9 3.8 4.2    109* 100   CO2 21 24 21   * 177* 292*   BUN 25* 26* 37*   CREA 1.11* 1.19* 1.61*   CA 8.0* 7.8* 9.3 MG 2.0 1.8  --    PHOS  --  2.6  --    ALB  --  2.6* 3.3*   SGOT  --  13* 22   ALT  --  15 18     No components found for: GLPOC         Assessment / Plan:     Weakness: generalized; due to UTI and diarrhea. PT/OT on board  -SNF placement pending        UTI: symptomatic, and evident on UA  -continue Ceftriaxone   -urine cultures with Klebsiella; can de-escalate to PO at discharge         Diarrhea: resolved and now constipated   -start low dose docusate        JENN: mild. Likely from IVVD from diarrhea. Resolved.   -resume ARB, bumex and metformin    Dehydration 2/2 diarrhea and decreased po intake       HFrEF (heart failure with reduced ejection fraction) / Biventricular automatic implantable cardioverter defibrillator in situ: recent EF improved to 45%  -resume ARB and bumex        Hypertension (): BP controlled. -continue coreg, losartan       Sleep apnea (): Overview: wears O2 at night- cannot tolerate CPAP       DM type 2 (diabetes mellitus, type 2) with nephropathy: A1c markedly elevated at 8.8   -increase metformin. May benefit from [address] as well however would avoid insulin as suspect pt NOT emilie enough to safely administer       Anemia: macrocytic  -heme/onc consulted        Thyroid disease:  -TSH WNL.  Continue levothyroxine        Breast CA: outpatient follow up     Code Status: DNR    Total time spent with patient: [phone] [address] discussed with: Patient and Family    Discussed:  Code Status, Care Plan and D/C Planning    Prophylaxis:  Hep SQ    Disposition:  Home w/Family           ___________________________________________________    Attending Physician: Ute Choudhary MD

## 2019-08-02 NOTE — PROGRESS NOTES
Bedside and Verbal shift change report given to [address] (oncoming nurse) by Esteban Brambila RN (offgoing nurse). Report included the following information SBAR, Kardex, MAR, Accordion and Recent Results.

## 2019-08-03 VITALS
DIASTOLIC BLOOD PRESSURE: 77 MMHG | OXYGEN SATURATION: 96 % | BODY MASS INDEX: 30.15 KG/M2 | SYSTOLIC BLOOD PRESSURE: 141 MMHG | WEIGHT: 176.6 LBS | HEIGHT: 64 IN | HEART RATE: 73 BPM | TEMPERATURE: 97.8 F | RESPIRATION RATE: 16 BRPM

## 2019-08-03 LAB
ANION GAP SERPL CALC-SCNC: 7 MMOL/L (ref 5–15)
BASOPHILS # BLD: 0 K/UL (ref 0–0.1)
BASOPHILS NFR BLD: 1 % (ref 0–1)
BUN SERPL-MCNC: 26 MG/DL (ref 6–20)
BUN/CREAT SERPL: 24 (ref 12–20)
CALCIUM SERPL-MCNC: 8.3 MG/DL (ref 8.5–10.1)
CHLORIDE SERPL-SCNC: 109 MMOL/L (ref 97–108)
CO2 SERPL-SCNC: 22 MMOL/L (ref 21–32)
CREAT SERPL-MCNC: 1.07 MG/DL (ref 0.55–1.02)
DIFFERENTIAL METHOD BLD: ABNORMAL
EOSINOPHIL # BLD: 0.2 K/UL (ref 0–0.4)
EOSINOPHIL NFR BLD: 5 % (ref 0–7)
ERYTHROCYTE [DISTWIDTH] IN BLOOD BY AUTOMATED COUNT: 14.8 % (ref 11.5–14.5)
GLUCOSE BLD STRIP.AUTO-MCNC: 165 MG/DL (ref 65–100)
GLUCOSE BLD STRIP.AUTO-MCNC: 234 MG/DL (ref 65–100)
GLUCOSE SERPL-MCNC: 167 MG/DL (ref 65–100)
HCT VFR BLD AUTO: 24.8 % (ref 35–47)
HGB BLD-MCNC: 8.4 G/DL (ref 11.5–16)
IMM GRANULOCYTES # BLD AUTO: 0.1 K/UL (ref 0–0.04)
IMM GRANULOCYTES NFR BLD AUTO: 1 % (ref 0–0.5)
LYMPHOCYTES # BLD: 1.2 K/UL (ref 0.8–3.5)
LYMPHOCYTES NFR BLD: 29 % (ref 12–49)
MCH RBC QN AUTO: 34.9 PG (ref 26–34)
MCHC RBC AUTO-ENTMCNC: 33.9 G/DL (ref 30–36.5)
MCV RBC AUTO: 102.9 FL (ref 80–99)
MONOCYTES # BLD: 0.3 K/UL (ref 0–1)
MONOCYTES NFR BLD: 7 % (ref 5–13)
NEUTS SEG # BLD: 2.4 K/UL (ref 1.8–8)
NEUTS SEG NFR BLD: 57 % (ref 32–75)
NRBC # BLD: 0 K/UL (ref 0–0.01)
NRBC BLD-RTO: 0 PER 100 WBC
PLATELET # BLD AUTO: 221 K/UL (ref 150–400)
PMV BLD AUTO: 10.9 FL (ref 8.9–12.9)
POTASSIUM SERPL-SCNC: 4.5 MMOL/L (ref 3.5–5.1)
RBC # BLD AUTO: 2.41 M/UL (ref 3.8–5.2)
SERVICE CMNT-IMP: ABNORMAL
SERVICE CMNT-IMP: ABNORMAL
SODIUM SERPL-SCNC: 138 MMOL/L (ref 136–145)
WBC # BLD AUTO: 4.2 K/UL (ref 3.6–11)

## 2019-08-03 PROCEDURE — 74011250637 HC RX REV CODE- 250/637: Performed by: INTERNAL MEDICINE

## 2019-08-03 PROCEDURE — 36415 COLL VENOUS BLD VENIPUNCTURE: CPT

## 2019-08-03 PROCEDURE — 80048 BASIC METABOLIC PNL TOTAL CA: CPT

## 2019-08-03 PROCEDURE — 85025 COMPLETE CBC W/AUTO DIFF WBC: CPT

## 2019-08-03 PROCEDURE — 82962 GLUCOSE BLOOD TEST: CPT

## 2019-08-03 PROCEDURE — 74011250636 HC RX REV CODE- 250/636: Performed by: INTERNAL MEDICINE

## 2019-08-03 PROCEDURE — 74011636637 HC RX REV CODE- 636/637: Performed by: INTERNAL MEDICINE

## 2019-08-03 PROCEDURE — 74011000258 HC RX REV CODE- 258: Performed by: INTERNAL MEDICINE

## 2019-08-03 RX ORDER — CEPHALEXIN 250 MG/1
500 CAPSULE ORAL EVERY 6 HOURS
Status: DISCONTINUED | OUTPATIENT
Start: 2019-08-04 | End: 2019-08-03

## 2019-08-03 RX ORDER — BUMETANIDE 1 MG/1
1 TABLET ORAL DAILY
Qty: 90 TAB | Refills: 3 | Status: ON HOLD
Start: 2019-08-03 | End: 2019-12-27 | Stop reason: SDUPTHER

## 2019-08-03 RX ORDER — CEPHALEXIN 500 MG/1
500 CAPSULE ORAL EVERY 6 HOURS
Qty: 8 CAP | Refills: 0 | Status: SHIPPED | OUTPATIENT
Start: 2019-08-04 | End: 2019-08-06

## 2019-08-03 RX ORDER — CEPHALEXIN 250 MG/1
500 CAPSULE ORAL EVERY 8 HOURS
Status: DISCONTINUED | OUTPATIENT
Start: 2019-08-04 | End: 2019-08-03 | Stop reason: HOSPADM

## 2019-08-03 RX ADMIN — BUMETANIDE 1 MG: 1 TABLET ORAL at 09:00

## 2019-08-03 RX ADMIN — LOSARTAN POTASSIUM 25 MG: 50 TABLET, FILM COATED ORAL at 09:00

## 2019-08-03 RX ADMIN — INSULIN LISPRO 3 UNITS: 100 INJECTION, SOLUTION INTRAVENOUS; SUBCUTANEOUS at 12:25

## 2019-08-03 RX ADMIN — DOCUSATE SODIUM 100 MG: 100 CAPSULE, LIQUID FILLED ORAL at 09:00

## 2019-08-03 RX ADMIN — OMEGA-3 FATTY ACIDS CAP 1000 MG 1 CAPSULE: 1000 CAP at 09:00

## 2019-08-03 RX ADMIN — HEPARIN SODIUM 5000 UNITS: 5000 INJECTION INTRAVENOUS; SUBCUTANEOUS at 06:51

## 2019-08-03 RX ADMIN — CEFTRIAXONE SODIUM 1 G: 1 INJECTION, POWDER, FOR SOLUTION INTRAMUSCULAR; INTRAVENOUS at 01:58

## 2019-08-03 RX ADMIN — Medication 10 ML: at 06:51

## 2019-08-03 RX ADMIN — METFORMIN HYDROCHLORIDE 1000 MG: 500 TABLET ORAL at 09:00

## 2019-08-03 RX ADMIN — INSULIN LISPRO 2 UNITS: 100 INJECTION, SOLUTION INTRAVENOUS; SUBCUTANEOUS at 09:00

## 2019-08-03 RX ADMIN — LEVOTHYROXINE SODIUM 75 MCG: 75 TABLET ORAL at 05:00

## 2019-08-03 RX ADMIN — Medication 1 CAPSULE: at 09:00

## 2019-08-03 RX ADMIN — CARVEDILOL 6.25 MG: 6.25 TABLET, FILM COATED ORAL at 09:00

## 2019-08-03 NOTE — PROGRESS NOTES
[facility]    Medical Progress Note      NAME: Evita Fernandez   :  1928  MRM:  [id number]    Date/Time: 8/3/2019  8:12 AM          Assessment and Plan:     JENN - POA, mild due to IVVD from diarrhea. Resolved with IVF and now eating. Can resume ARB, bumex and metformin    UTI - POA, likely triggered by diarrhea. Pan sensitive Klebsiella on Cx. Improved on Ceftriaxone. Home to complete Keflex. Weakness - generalized; due to UTI and diarrhea. PT/OT on board. Ready to SNF today.     HFrEF (heart failure with reduced ejection fraction) / Hypertension / Biventricular automatic implantable cardioverter defibrillator in situ - POA, stable, recent EF improved to 45%. At home resume coreg, losartan, atorvastatin and bumex    DM type 2 (diabetes mellitus, type 2) with nephropathy - Diabetic diet and counseling. SSI per protocol. Continue home metformin. A1c was 8.8. Diarrhea - Resolved. On FloraQ    Anxiety and depression - Zoloft      Sleep apnea - continue O2 at night- cannot tolerate CPAP     Anemia - Stable. Chronic disease. Taking MVI. Heme follow up    Hypothyroid - TSH WNL. Continue levothyroxine      Breast CA - outpatient follow up       Subjective:     Chief Complaint:  Feels much better and wants to go to SNF    ROS:  (bold if positive, if negative)    Tolerating some PT  Tolerating Diet        Objective:     Last 24hrs VS reviewed since prior progress note. Most recent are:    Visit Vitals  /76 (BP 1 Location: Left arm, BP Patient Position: At rest)   Pulse 73   Temp 98.1 °F (36.7 °C)   Resp 14   Ht 5' 4\" (1.626 m)   Wt 80.1 kg (176 lb 9.6 oz)   SpO2 93%   Breastfeeding?  No   BMI 30.31 kg/m²     SpO2 Readings from Last 6 Encounters:   19 93%   19 97%   19 95%   19 96%   18 96%   18 97%            Intake/Output Summary (Last 24 hours) at 8/3/2019 0812  Last data filed at 2019 0829  Gross per 24 hour   Intake 180 ml   Output    Net 180 ml Physical Exam:    Gen: Obese, in no acute distress  HEENT:  Pink conjunctivae, PERRL, hearing intact to voice, moist mucous membranes  Neck:  Supple, without masses, thyroid non-tender  Resp:  No accessory muscle use, clear breath sounds without wheezes rales or rhonchi  Card:  No murmurs, normal S1, S2 without thrills, bruits or peripheral edema  Abd:  Soft, non-tender, non-distended, normoactive bowel sounds are present, no mass  Lymph:  No cervical or inguinal adenopathy  Musc:  No cyanosis or clubbing  Skin:  No rashes or ulcers, skin turgor is good  Neuro:  Cranial nerves are grossly intact, general motor weakness, follows commands appropriately  Psych:  Good insight, oriented to person, place and time, alert    Telemetry reviewed:   normal sinus rhythm  __________________________________________________________________  Medications Reviewed: (see below)  Medications:     Current Facility-Administered Medications   Medication Dose Route Frequency    [START ON 8/4/2019] cephALEXin (KEFLEX) capsule 500 mg  500 mg Oral Q6H    docusate sodium (COLACE) capsule 100 mg  100 mg Oral DAILY    bumetanide (BUMEX) tablet 1 mg  1 mg Oral DAILY    metFORMIN (GLUCOPHAGE) tablet 1,000 mg  1,000 mg Oral BID WITH MEALS    losartan (COZAAR) tablet 25 mg  25 mg Oral DAILY    insulin lispro (HUMALOG) injection   SubCUTAneous AC&HS    glucose chewable tablet 16 g  4 Tab Oral PRN    glucagon (GLUCAGEN) injection 1 mg  1 mg IntraMUSCular PRN    dextrose 10% infusion 125-250 mL  125-250 mL IntraVENous PRN    sodium chloride (NS) flush 5-40 mL  5-40 mL IntraVENous Q8H    sodium chloride (NS) flush 5-40 mL  5-40 mL IntraVENous PRN    acetaminophen (TYLENOL) tablet 650 mg  650 mg Oral Q6H PRN    naloxone (NARCAN) injection 0.4 mg  0.4 mg IntraVENous PRN    cefTRIAXone (ROCEPHIN) 1 g in 0.9% sodium chloride (MBP/ADV) 50 mL  1 g IntraVENous Q24H    carvedilol (COREG) tablet 6.25 mg  6.25 mg Oral BID WITH MEALS    co-enzyme Q-10 (CO Q-10) capsule 50 mg  50 mg Oral PCL    omega 3-DHA-EPA-fish oil 1,000 mg (120 mg-180 mg) capsule 1 Cap  1 Cap Oral DAILY    levothyroxine (SYNTHROID) tablet 75 mcg  75 mcg Oral ACB    melatonin tablet 3 mg  3 mg Oral QHS    therapeutic multivitamin (THERAGRAN) tablet 1 Tab  1 Tab Oral PCL    pravastatin (PRAVACHOL) tablet 40 mg  40 mg Oral QHS    sertraline (ZOLOFT) tablet 100 mg  100 mg Oral QHS    lactobac ac& pc-s.therm-b.anim (RAFFY Q/RISAQUAD)  1 Cap Oral DAILY    heparin (porcine) injection 5,000 Units  5,000 Units SubCUTAneous Q8H        Lab Data Reviewed: (see below)  Lab Review:     Recent Labs     08/03/19 0429 08/02/19 0350 08/01/19  0520   WBC 4.2 3.7 3.4*   HGB 8.4* 8.5* 8.5*   HCT 24.8* 25.4* 24.9*    208 197     Recent Labs     08/03/19 0429 08/02/19 0350 08/01/19  0520    137 138   K 4.5 3.9 3.8   * 108 109*   CO2 22 21 24   * 168* 177*   BUN 26* 25* 26*   CREA 1.07* 1.11* 1.19*   CA 8.3* 8.0* 7.8*   MG  --  2.0 1.8   PHOS  --   --  2.6   ALB  --   --  2.6*   TBILI  --   --  0.3   SGOT  --   --  13*   ALT  --   --  15     Lab Results   Component Value Date/Time    Glucose (POC) 165 (H) 08/03/2019 06:50 AM    Glucose (POC) 170 (H) 08/02/2019 08:23 PM    Glucose (POC) 200 (H) 08/02/2019 03:52 PM    Glucose (POC) 246 (H) 08/02/2019 11:11 AM    Glucose (POC) 185 (H) 08/02/2019 06:57 AM     No results for input(s): PH, PCO2, PO2, HCO3, FIO2 in the last 72 hours. No results for input(s): INR in the last 72 hours.     No lab exists for component: INREXT  All Micro Results     Procedure Component Value Units Date/Time    CULTURE, URINE [[id number]]  (Abnormal)  (Susceptibility) Collected:  07/31/19 0136    Order Status:  Completed Specimen:  Urine Updated:  08/02/19 1042     Special Requests: --        NO SPECIAL REQUESTS  Reflexed from [id number]       [place] Count --        >100,000  COLONIES/mL       Culture result: KLEBSIELLA PNEUMONIAE       CULTURE, URINE [[id number]] Collected:  07/31/19 0215    Order Status:  Canceled Specimen:  Urine     CULTURE, URINE [[id number]]     Order Status:  Canceled Specimen:  Urine     C. DIFFICILE AG & TOXIN A/B [[id number]] Collected:  07/31/19 0130    Order Status:  Canceled Specimen:  Stool     OVA & PARASITES, STOOL [[id number]]     Order Status:  Canceled Specimen:  Stool from Feces           Other pertinent lab: none    Total time spent with patient: 39 Minutes I reviewed chart, notes, data and current medications in the medical record. I have examined and treated the patient at bedside during this period.                  Care Plan discussed with: Patient, Care Manager, Nursing Staff and >50% of time spent in counseling and coordination of care    Discussed:  Care Plan and D/C Planning    Prophylaxis:  H2B/PPI    Disposition:  Home w/Family           ___________________________________________________    Attending Physician: Elena Haney MD

## 2019-08-03 NOTE — PROGRESS NOTES
1206 RN attempted to call report to [facility] room 307. Phone continued to ring after being transferred to the correct unit. 200 RN called report:    TRANSFER - OUT REPORT:    Verbal report given to Divine Castillo (name) hira Augustine  being transferred to [facility] (unit) for routine progression of care       Report consisted of patients Situation, Background, Assessment and   Recommendations(SBAR). Information from the following report(s) SBAR, Kardex, Intake/Output, MAR, Accordion and Recent Results was reviewed with the receiving nurse. Opportunity for questions and clarification was provided.       Patient transported with:  Wheelchair Payal Stratton

## 2019-08-03 NOTE — PROGRESS NOTES
Bedside and Verbal shift change report given to [address]. stella RN (oncoming nurse) by Kelle Flores RN (offgoing nurse). Report included the following information SBAR, Kardex, MAR and Accordion.

## 2019-08-03 NOTE — DISCHARGE SUMMARY
Physician Discharge Summary     Patient ID:  Demetrio Jeffrey  [id number]  56 y.o.  7/5/1928    Admit date: 7/31/2019    Discharge date and time: 8/3/2019    Admission Diagnoses: Weakness [R53.1]    Discharge Diagnoses:    Principal Diagnosis       JENN (acute kidney injury) ([facility] (7/31/2019)                                              Other Diagnoses  Active Problems:    Hypertension ()    GERD (gastroesophageal reflux disease) ()    Sleep apnea ()    DM type 2 (diabetes mellitus, type 2) ([facility] (2/13/2016)    HFrEF (heart failure with reduced ejection fraction) ([facility] (8/23/2016)    Biventricular automatic implantable cardioverter defibrillator in situ (1/3/2018)    Type 2 diabetes mellitus with nephropathy ([facility] (1/3/2018)    Thyroid disease (7/31/2019)    Breast CA ([facility] (7/31/2019)    Diarrhea (7/31/2019)    Anemia (7/31/2019)    Weakness (7/31/2019)    UTI (urinary tract infection) (7/31/2019)      Hospital Course:   JENN - POA, mild due to IVVD from diarrhea. Resolved with IVF and now eating. Can resume ARB, bumex and metformin     UTI - POA, likely triggered by diarrhea. Pan sensitive Klebsiella on Cx. Improved on Ceftriaxone. Home to complete Keflex.     Weakness - generalized; due to UTI and diarrhea. PT/OT on board. Ready to SNF today.     HFrEF (heart failure with reduced ejection fraction) / Hypertension / Biventricular automatic implantable cardioverter defibrillator in situ - POA, stable, recent EF improved to 45%. At home resume coreg, losartan, atorvastatin and bumex     DM type 2 (diabetes mellitus, type 2) with nephropathy - Diabetic diet and counseling. SSI per protocol. Continue home metformin. A1c was 8.8.     Diarrhea - Resolved. On FloraQ     Anxiety and depression - Zoloft      Sleep apnea - continue O2 at night- cannot tolerate CPAP     Anemia - Stable. Chronic disease. Taking MVI. Heme follow up     Hypothyroid - TSH WNL.  Continue levothyroxine      Breast CA - outpatient follow up     PCP: Mercedes Her MD    Consults: Hematology/Oncology    Significant Diagnostic Studies: See Hospital Course    Discharged home in improved condition. Discharge Exam:  /76 (BP 1 Location: Left arm, BP Patient Position: At rest)   Pulse 73   Temp 98.1 °F (36.7 °C)   Resp 14   Ht 5' 4\" (1.626 m)   Wt 80.1 kg (176 lb 9.6 oz)   SpO2 93%   Breastfeeding? No   BMI 30.31 kg/m²      Gen: Obese, in no acute distress  HEENT:  Pink conjunctivae, PERRL, hearing intact to voice, moist mucous membranes  Neck:  Supple, without masses, thyroid non-tender  Resp:  No accessory muscle use, clear breath sounds without wheezes rales or rhonchi  Card:  No murmurs, normal S1, S2 without thrills, bruits or peripheral edema  Abd:  Soft, non-tender, non-distended, normoactive bowel sounds are present, no mass  Lymph:  No cervical or inguinal adenopathy  Musc:  No cyanosis or clubbing  Skin:  No rashes or ulcers, skin turgor is good  Neuro:  Cranial nerves are grossly intact, general motor weakness, follows commands appropriately  Psych:  Good insight, oriented to person, place and time, alert    Patient Instructions:   Current Discharge Medication List      START taking these medications    Details   cephALEXin (KEFLEX) 500 mg capsule Take 1 Cap by mouth every six (6) hours for 2 days. Qty: 8 Cap, Refills: 0         CONTINUE these medications which have CHANGED    Details   bumetanide (BUMEX) 1 mg tablet Take 1 Tab by mouth daily. Qty: 90 Tab, Refills: 3    Associated Diagnoses: SOB (shortness of breath); Chronic systolic heart failure ([facility] Essential hypertension         CONTINUE these medications which have NOT CHANGED    Details   losartan (COZAAR) 25 mg tablet Take 25 mg by mouth every other day. metFORMIN (GLUCOPHAGE) 500 mg tablet Take 500 mg by mouth two (2) times daily (with meals). calcium carbonate (TUMS) 200 mg calcium (500 mg) chew Take 1 Tab by mouth three (3) times daily as needed (GERD).       carvedilol (COREG) 6.25 mg tablet TAKE 1 TABLET TWICE A DAY WITH MEALS  Qty: 180 Tab, Refills: 0    Associated Diagnoses: SOB (shortness of breath); Chronic systolic heart failure ([address] Essential hypertension      levothyroxine (SYNTHROID) 75 mcg tablet Take 75 mcg by mouth Daily (before breakfast). co-enzyme Q-10 (CO Q-10) 50 mg capsule Take 50 mg by mouth daily (after lunch). sertraline (ZOLOFT) 100 mg tablet Take 100 mg by mouth daily. ergocalciferol (VITAMIN D2) 50,000 unit capsule Take one tablet on Monday, Wednesday and Friday. melatonin 10 mg cap Take 10 mg by mouth nightly. multivitamin (ONE A DAY) tablet Take 1 Tab by mouth daily (after lunch). DOCOSAHEXANOIC ACID/EPA (FISH OIL PO) Take 1 Tab by mouth daily (after lunch). STOP taking these medications       potassium chloride (K-DUR, KLOR-CON) 20 mEq tablet Comments:   Reason for Stopping:         pravastatin (PRAVACHOL) 40 mg tablet Comments:   Reason for Stopping:             Activity: Activity as tolerated and PT/OT Eval and Treat  Diet: Cardiac Diet, Clear liquids, advance as tolerated and Low fat, Low cholesterol  Wound Care: None needed    Follow-up with your PCP and Dr Carmenza Mckinley I hematology in a few weeks.   Follow-up tests/labs - none    Signed:  Jose Sher MD  8/3/2019  8:22 AM

## 2019-08-03 NOTE — PROGRESS NOTES
[facility] Dosing Services: Antimicrobial Stewardship Daily Doc    Consult for antibiotic dosing of Cephalexin by Dr. Girish Salinas  Indication:UTI  Day of Therapy 3    Ht Readings from Last 1 Encounters:   07/31/19 162.6 cm (64\")        Wt Readings from Last 1 Encounters:   08/02/19 80.1 kg (176 lb 9.6 oz)      Non-Kinetic Antimicrobial Dosing Regimen:   Current Regimen:  Cephalexin 500 mg Q6 hrs  Recommendation: Keflex dose adjusted to 500 mg Q8 hrs for CrCl 30-60 ml/min  Dose administration notes:   Doses given appropriately as scheduled    Other Antimicrobial   (not dosed by pharmacist) S/p Ceftriaxone 1 g Q24 hrs x 3 days   Cultures 7/31 Klebsiella: Sens cephalexin, ceftriaxone   Serum Creatinine Lab Results   Component Value Date/Time    Creatinine 1.07 (H) 08/03/2019 04:29 AM    Creatinine (POC) 1.0 01/15/2014 12:49 PM           Creatinine Clearance Estimated Creatinine Clearance: 35.1 mL/min (A) (based on SCr of 1.07 mg/dL (H)).      Temp Temp: 98.1 °F (36.7 °C)       WBC Lab Results   Component Value Date/Time    WBC 4.2 08/03/2019 04:29 AM          H/H Lab Results   Component Value Date/Time    HGB 8.4 (L) 08/03/2019 04:29 AM          Platelets    Lab Results   Component Value Date/Time    PLATELET 080 28/59/6296 04:29 AM            Sarkis,  Pharmacist ANDI SchmidtD Contact information: [phone]

## 2019-08-03 NOTE — PROGRESS NOTES
8/3/2019  9:33 AM      Communication to Patient/Family:  Met with patient and family and they are agreeable to the transition plan. SNF/Rehab Transition:  Patient has been accepted to [facility] and meets criteria for admission. Patient will transported by Jennifer Landry through [facility] and expected to leave at 12:30PM    Communication to SNF/Rehab:  Bedside RN, has been notified to update the transition plan to the facility and call report ([phone], ask for nurse for Room 307). Discharge information has been updated on the AVS. And communicated to facility via fax ([phone])      Nursing Please include all hard scripts for controlled substances, med rec and dc summary, and AVS in packet. Reviewed and confirmed with facility,can manage the patient care needs for the following:     Karen Reynolds with (X) only those applicable:  Medication:  []Medications are available at the facility  []IV Antibiotics    []Controlled Substance  hard copies available sent. []Weekly Labs    Equipment:  []CPAP/BiPAP  []Wound Vacuum  []Mortensen or Urinary Device  []PICC/Central Line  []Nebulizer  []Ventilator    Treatment:  []Isolation (for MRSA, VRE, etc.)  []Surgical Drain Management  []Tracheostomy Care  []Dressing Changes  []Dialysis with transportation  []PEG Care  []Oxygen  []Daily Weights for Heart Failure    Dietary:  []Any diet limitations  []Tube Feedings   []Total Parenteral Management (TPN)    Financial Resources:  []Medicaid Application Completed/Tracking #  [] LTSS Screening Completed  []Level II Completed    Advanced Care Plan:  []Surrogate Decision Maker of Care  []POA  []Communicated Code Status and copy sent. Other:             Called [facility] and confirmed pt can be admitted today. Pt will be going to Room #307. Nursing to call Report: [phone], ask for nurse for room 307. W/C transport set up through [facility]. P/u time will be 12:30pm. Provided pt and Nursing with update. Faxed AVS and facesheet and bundle sheet, MAR, d/c summary and hardscript to [facility] (fax # [phone]). Provided pt with IMM 2 letter ,pt signed copy, provided copy to pt and placed copy in pt's hard chart. No further d/c needs noted at this time.     Mitch Fernandez,  Care Management

## 2019-08-29 ENCOUNTER — OFFICE VISIT (OUTPATIENT)
Dept: CARDIOLOGY CLINIC | Age: 84
End: 2019-08-29

## 2019-08-29 VITALS
BODY MASS INDEX: 29.5 KG/M2 | SYSTOLIC BLOOD PRESSURE: 132 MMHG | WEIGHT: 172.8 LBS | RESPIRATION RATE: 18 BRPM | HEART RATE: 76 BPM | HEIGHT: 64 IN | DIASTOLIC BLOOD PRESSURE: 68 MMHG

## 2019-08-29 DIAGNOSIS — I10 ESSENTIAL HYPERTENSION: Primary | ICD-10-CM

## 2019-08-29 DIAGNOSIS — D64.9 ANEMIA, UNSPECIFIED TYPE: ICD-10-CM

## 2019-08-29 RX ORDER — POLYETHYLENE GLYCOL 3350 17 G/17G
17 POWDER, FOR SOLUTION ORAL DAILY
COMMUNITY
End: 2019-12-26

## 2019-08-29 RX ORDER — INSULIN GLARGINE 100 [IU]/ML
22 INJECTION, SOLUTION SUBCUTANEOUS
COMMUNITY

## 2019-08-29 RX ORDER — LEVOTHYROXINE SODIUM 50 UG/1
50 TABLET ORAL
COMMUNITY

## 2019-08-29 NOTE — PROGRESS NOTES
Visit Vitals  /68 (BP 1 Location: Left arm)   Pulse 76   Resp 18   Ht 5' 4\" (1.626 m)   Wt 172 lb 12.8 oz (78.4 kg)   BMI 29.66 kg/m²       Patient is here for a hospital follow up. Feeling better. Has HARTLEY.

## 2019-08-29 NOTE — PROGRESS NOTES
Alison Paris MD. [facility]              Patient: Tyrell Palma  : 1928      Today's Date: 2019          HISTORY OF PRESENT ILLNESS:     History of Present Illness:  Here for FU. Had recent admission with UTI and diarrhea. As then in rehab. Doing better now. She has been stable cardiac wise. A little HARTLEY with exercise. Doing OK overall.               PAST MEDICAL HISTORY:     Past Medical History:   Diagnosis Date    Arthritis     Breast CA ([address]     Breast cancer ([address]     Diabetes ([address]     Diarrhea     chronic     Dyslipidemia     GERD (gastroesophageal reflux disease)     Hearing loss     Hypertension     LBBB (left bundle branch block)     chronic LBBB    Melanoma ([address]     Non-ischemic cardiomyopathy ([address]     Cath 2/16/15 - Mild (non-obstructive) CAD,LVEF 30%, 2+ MR    Seizures (HCC)     X1 post partum after first child was born- no more seizures    Skin cancer     melanoma on legs    Sleep apnea     wears O2 at night- cannot tolerate CPAP    Snoring     SOB (shortness of breath)     Systolic CHF ([address]     Admission 16    Thyroid disease          Past Surgical History:   Procedure Laterality Date    HX BREAST LUMPECTOMY  2003    right    HX CATARACT REMOVAL      bilateral    HX CHOLECYSTECTOMY      HX DILATION AND CURETTAGE  1960    HX HEART CATHETERIZATION      x2- no blockages    HX HEENT      surgery on upper jaw    HX HYSTERECTOMY  1967    HX HYSTEROSCOPY      HX OOPHORECTOMY  1967    left    HX ORTHOPAEDIC  2010    right knee replacement    HX ORTHOPAEDIC      repair torn meniscus left knee    HX ORTHOPAEDIC      repair knuckle on left pinky finger    HX OTHER SURGICAL  2000    remove benign tumore right side between rib and right lung    HX PACEMAKER      HX PARTIAL THYROIDECTOMY      HX UROLOGICAL      bladder surgery x2           MEDICATIONS:     Current Outpatient Medications   Medication Sig Dispense Refill    polyethylene glycol (MIRALAX) 17 gram packet Take 17 g by mouth daily.  levothyroxine (SYNTHROID) 50 mcg tablet Take  by mouth Daily (before breakfast).  insulin glargine (LANTUS U-100 INSULIN) 100 unit/mL injection 22 Units by SubCUTAneous route every evening.  SITagliptin (JANUVIA) 50 mg tablet Take 50 mg by mouth daily.  bumetanide (BUMEX) 1 mg tablet Take 1 Tab by mouth daily. 90 Tab 3    losartan (COZAAR) 25 mg tablet Take 25 mg by mouth every other day.  calcium carbonate (TUMS) 200 mg calcium (500 mg) chew Take 1 Tab by mouth three (3) times daily as needed (GERD).  carvedilol (COREG) 6.25 mg tablet TAKE 1 TABLET TWICE A DAY WITH MEALS 180 Tab 0    co-enzyme Q-10 (CO Q-10) 50 mg capsule Take 50 mg by mouth daily (after lunch).  sertraline (ZOLOFT) 100 mg tablet Take 100 mg by mouth daily.  ergocalciferol (VITAMIN D2) 50,000 unit capsule Take 50,000 Units by mouth every seven (7) days.  melatonin 10 mg cap Take 10 mg by mouth nightly.  multivitamin (ONE A DAY) tablet Take 1 Tab by mouth daily (after lunch).  DOCOSAHEXANOIC ACID/EPA (FISH OIL PO) Take 1 Tab by mouth daily (after lunch). Allergies   Allergen Reactions    Beta-Blockers (Beta-Adrenergic Blocking Agts) Unknown (comments)     Made her feel tired, breakout on her back    Other Medication Unknown (comments)     Bromides    Phenobarbital Unknown (comments)    Sulfa (Sulfonamide Antibiotics) Swelling     rash             SOCIAL HISTORY:     Social History     Tobacco Use    Smoking status: Never Smoker    Smokeless tobacco: Never Used   Substance Use Topics    Alcohol use:  Yes     Alcohol/week: 4.0 standard drinks     Types: 3 Glasses of wine, 1 Shots of liquor per week    Drug use: No         FAMILY HISTORY:     Family History   Problem Relation Age of Onset    Stroke Mother            REVIEW OF SYSTEMS:             Review of Symptoms:  Constitutional: Negative for fever, chills  HEENT: Negative for nosebleeds, tinnitus, and vision changes.    Respiratory: + HARTLEY, occ cough   Cardiovascular: Negative for  Syncope    Gastrointestinal: Negative for   melena.  + bouts diarrhea   Genitourinary: + frequent UTI's    Musculoskeletal: Negative for myalgias.    Skin: Negative for rash  Heme: No problems bleeding. Neurological: Negative for speech change and focal weakness.                PHYSICAL EXAM:             Physical Exam:  Visit Vitals  /68 (BP 1 Location: Left arm)   Pulse 76   Resp 18   Ht 5' 4\" (1.626 m)   Wt 172 lb 12.8 oz (78.4 kg)   BMI 29.66 kg/m²       Patient appears generally well, mood and affect are appropriate and pleasant. HEENT:  Hearing intact, non-icteric, normocephalic, atraumatic.    Neck Exam: Supple, No sig JVD sitting up.    Lung Exam: Clear to auscultation, even breath sounds.    Cardiac Exam: Regular rate and rhythm with no murmur  Abdomen: Soft, non-tender    Extremities: Moves all ext well. No lower extremity edema. Psych: Appropriate affect  Neuro - Grossly intact              LABS / OTHER STUDIES:              Lab Results   Component Value Date/Time    Sodium 138 08/03/2019 04:29 AM    Potassium 4.5 08/03/2019 04:29 AM    Chloride 109 (H) 08/03/2019 04:29 AM    CO2 22 08/03/2019 04:29 AM    Anion gap 7 08/03/2019 04:29 AM    Glucose 167 (H) 08/03/2019 04:29 AM    BUN 26 (H) 08/03/2019 04:29 AM    Creatinine 1.07 (H) 08/03/2019 04:29 AM    BUN/Creatinine ratio 24 (H) 08/03/2019 04:29 AM    GFR est AA 58 (L) 08/03/2019 04:29 AM    GFR est non-AA 48 (L) 08/03/2019 04:29 AM    Calcium 8.3 (L) 08/03/2019 04:29 AM    Bilirubin, total 0.3 08/01/2019 05:20 AM    AST (SGOT) 13 (L) 08/01/2019 05:20 AM    Alk.  phosphatase 58 08/01/2019 05:20 AM    Protein, total 5.8 (L) 08/01/2019 05:20 AM    Albumin 2.6 (L) 08/01/2019 05:20 AM    Globulin 3.2 08/01/2019 05:20 AM    A-G Ratio 0.8 (L) 08/01/2019 05:20 AM    ALT (SGPT) 15 08/01/2019 05:20 AM     Lab Results   Component Value Date/Time    WBC 4.2 08/03/2019 04:29 AM    Hemoglobin (POC) 12.6 01/15/2014 12:49 PM    HGB 8.4 (L) 08/03/2019 04:29 AM    Hematocrit (POC) 37 01/15/2014 12:49 PM    HCT 24.8 (L) 08/03/2019 04:29 AM    PLATELET 309 85/96/8124 04:29 AM    .9 (H) 08/03/2019 04:29 AM       Lab Results   Component Value Date/Time    Cholesterol, total 184 02/02/2017 12:00 PM    HDL Cholesterol 57 02/02/2017 12:00 PM    LDL, calculated 52 02/02/2017 12:00 PM    VLDL, calculated 75 (H) 02/02/2017 12:00 PM    Triglyceride 377 (H) 02/02/2017 12:00 PM     Lab Results   Component Value Date/Time    TSH 1.79 07/31/2019 12:32 AM                  CARDIAC DIAGNOSTICS:                 Cardiac Evaluation Includes:  EKG 2/13/16 - NSR, IVCD, PRWP      Echo 2/13/16 - TDS.   LVEF 30-35%.   There was moderate diffuse hypokinesis - Only the basal anterolateral and infrolateral walls contracts normally.  RV size and function normal.  RVSP 35      CTA chest 2/13/16 - No evidence of pulmonary embolus. Mild pulmonary interstitial edema and small bilateral pleural effusions. Small bilateral indeterminate pulmonary nodules, measuring up to 4 mm.      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 Moderate to severe deep white matter ischemic change. 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  No new confluent infarct or hemorrhage or mass effect.      Echo 2/8/18  - LVEF 50%, grade 1 diastology, RV normal, RVSP 35     Echo 11/15/18 - LVEF 45%         EKG 12/1/15 - NSR, Nonspecific intraventricular block,   EKG 1/3/18 - V paced                 ASSESSMENT AND PLAN:             Assessment and Plan:  1) Acute / Chronic decompensated CHF (systolic dysfunction, HFrEF)(non-ischemic CM) - discovered 2/13/16   - Echo 4/14/16 shows that LVEF still is 30%.   Echo 5/26/16 shows LVEF just 20%.     Echo 7/28/16 with LVEF 25%. - BIV ICD placed 8/15/16   - She felt a little better afterwards    - On Echo 2/2/17 LVEF is better at 40%.  2/8/18, LVEF improved to 50%  - continue Coreg.    - Due to cough, switched from lisinopril to losartan. She could not tolerate Entresto at all.    - Due to prior falls (and risk of orthostasis), have kept meds on lower doses   - volume status is fair on Bumex  - On 8/29/19 - She seems stable cardiac wise. Will continue regimen as is. Recheck echo next visit.        2) HTN    - BP OK - asked her to follow BP at home   - continue meds      3) See me in 3 months.  Patient expressed understanding of the plan - questions were answered.       On a side note, she is from [place] and worked for years as a .  She enjoy [place] [employer] cruises.   [address] SHarry Cummins MD, [address]  [address], [address]      [id number] [address]  [address]  [facility], [address] [place] [address]  Ph: [phone]                                [phone]

## 2019-08-30 ENCOUNTER — TELEPHONE (OUTPATIENT)
Dept: CARDIOLOGY CLINIC | Age: 84
End: 2019-08-30

## 2019-08-30 LAB
ALBUMIN SERPL-MCNC: 4.1 G/DL (ref 3.2–4.6)
ALBUMIN/GLOB SERPL: 2 {RATIO} (ref 1.2–2.2)
ALP SERPL-CCNC: 54 IU/L (ref 39–117)
ALT SERPL-CCNC: 16 IU/L (ref 0–32)
AST SERPL-CCNC: 15 IU/L (ref 0–40)
BILIRUB SERPL-MCNC: 0.2 MG/DL (ref 0–1.2)
BUN SERPL-MCNC: 43 MG/DL (ref 10–36)
BUN/CREAT SERPL: 31 (ref 12–28)
CALCIUM SERPL-MCNC: 8.9 MG/DL (ref 8.7–10.3)
CHLORIDE SERPL-SCNC: 101 MMOL/L (ref 96–106)
CO2 SERPL-SCNC: 23 MMOL/L (ref 20–29)
CREAT SERPL-MCNC: 1.38 MG/DL (ref 0.57–1)
ERYTHROCYTE [DISTWIDTH] IN BLOOD BY AUTOMATED COUNT: 14.8 % (ref 12.3–15.4)
GLOBULIN SER CALC-MCNC: 2.1 G/DL (ref 1.5–4.5)
GLUCOSE SERPL-MCNC: 143 MG/DL (ref 65–99)
HCT VFR BLD AUTO: 26.1 % (ref 34–46.6)
HGB BLD-MCNC: 8.9 G/DL (ref 11.1–15.9)
INTERPRETATION: NORMAL
MCH RBC QN AUTO: 35.2 PG (ref 26.6–33)
MCHC RBC AUTO-ENTMCNC: 34.1 G/DL (ref 31.5–35.7)
MCV RBC AUTO: 103 FL (ref 79–97)
PLATELET # BLD AUTO: 291 X10E3/UL (ref 150–450)
POTASSIUM SERPL-SCNC: 5.3 MMOL/L (ref 3.5–5.2)
PROT SERPL-MCNC: 6.2 G/DL (ref 6–8.5)
RBC # BLD AUTO: 2.53 X10E6/UL (ref 3.77–5.28)
SODIUM SERPL-SCNC: 140 MMOL/L (ref 134–144)
WBC # BLD AUTO: 4.5 X10E3/UL (ref 3.4–10.8)

## 2019-08-30 NOTE — PROGRESS NOTES
Please call patient. She seems a little bit dehydrated. Have her drink more water and cut Bumex to 4 days a week. Watch weight - if it goes up 3 lbs, then take extra Bumex that week. Repeat BMP and CBC in one month. Also, she is still anemic - please remind her to to FU with Dr. Fernandez Murry. Thanks.

## 2019-08-30 NOTE — TELEPHONE ENCOUNTER
Was unable to LVM. Per Dr. Esther Quezada: \"Please call patient. Gume Galicia seems a little bit dehydrated. Windy Basque her drink more water and cut Bumex to 4 days a week.  Watch weight - if it goes up 3 lbs, then take extra Bumex that week.   Repeat BMP and CBC in one month.  Also, she is still anemic - please remind her to to FU with Dr. Lalito Rios.   Thanks. \"    LVM on \"friend's\" phone (HIPPA approved) to have pt call us back.

## 2019-09-03 ENCOUNTER — TELEPHONE (OUTPATIENT)
Dept: CARDIOLOGY CLINIC | Age: 84
End: 2019-09-03

## 2019-09-03 NOTE — TELEPHONE ENCOUNTER
Pt daughter, Monique Dates called stating she is returning nurse call from Friday.     Phone # [phone]

## 2019-09-03 NOTE — TELEPHONE ENCOUNTER
Patient's daughter, Zulema Nguyen, is returning your call. Please call back at [phone].  Thanks

## 2019-09-03 NOTE — TELEPHONE ENCOUNTER
Per Dr. Veronica Bravo: \"Please call patient. Sahra Hartley seems a little bit dehydrated. Elisabeth Neumann her drink more water and cut Bumex to 4 days a week.  Watch weight - if it goes up 3 lbs, then take extra Bumex that week.   Repeat BMP and CBC in one month.  Also, she is still anemic - please remind her to to FU with Dr. oRnald Allison.   Thanks. \"

## 2019-12-19 ENCOUNTER — APPOINTMENT (OUTPATIENT)
Dept: GENERAL RADIOLOGY | Age: 84
End: 2019-12-19
Attending: EMERGENCY MEDICINE
Payer: MEDICARE

## 2019-12-19 ENCOUNTER — HOSPITAL ENCOUNTER (EMERGENCY)
Age: 84
Discharge: HOME | End: 2019-12-19
Attending: EMERGENCY MEDICINE
Payer: MEDICARE

## 2019-12-19 ENCOUNTER — APPOINTMENT (OUTPATIENT)
Dept: NON INVASIVE DIAGNOSTICS | Age: 84
End: 2019-12-19
Attending: STUDENT IN AN ORGANIZED HEALTH CARE EDUCATION/TRAINING PROGRAM
Payer: MEDICARE

## 2019-12-19 VITALS
SYSTOLIC BLOOD PRESSURE: 138 MMHG | WEIGHT: 171.96 LBS | BODY MASS INDEX: 29.36 KG/M2 | HEART RATE: 74 BPM | TEMPERATURE: 98.4 F | HEIGHT: 64 IN | RESPIRATION RATE: 27 BRPM | OXYGEN SATURATION: 100 % | DIASTOLIC BLOOD PRESSURE: 63 MMHG

## 2019-12-19 DIAGNOSIS — R06.00 DYSPNEA, UNSPECIFIED TYPE: ICD-10-CM

## 2019-12-19 DIAGNOSIS — N30.00 ACUTE CYSTITIS WITHOUT HEMATURIA: Primary | ICD-10-CM

## 2019-12-19 LAB
ALBUMIN SERPL-MCNC: 3.4 G/DL (ref 3.5–5)
ALBUMIN/GLOB SERPL: 1 {RATIO} (ref 1.1–2.2)
ALP SERPL-CCNC: 56 U/L (ref 45–117)
ALT SERPL-CCNC: 18 U/L (ref 12–78)
ANION GAP SERPL CALC-SCNC: 8 MMOL/L (ref 5–15)
APPEARANCE UR: ABNORMAL
AST SERPL-CCNC: 12 U/L (ref 15–37)
ATRIAL RATE: 87 BPM
BACTERIA URNS QL MICRO: ABNORMAL /HPF
BASOPHILS # BLD: 0 K/UL (ref 0–0.1)
BASOPHILS NFR BLD: 1 % (ref 0–1)
BILIRUB SERPL-MCNC: 0.4 MG/DL (ref 0.2–1)
BILIRUB UR QL: NEGATIVE
BNP SERPL-MCNC: 1129 PG/ML
BUN SERPL-MCNC: 39 MG/DL (ref 6–20)
BUN/CREAT SERPL: 30 (ref 12–20)
CALCIUM SERPL-MCNC: 9 MG/DL (ref 8.5–10.1)
CALCULATED P AXIS, ECG09: 91 DEGREES
CALCULATED R AXIS, ECG10: 139 DEGREES
CALCULATED T AXIS, ECG11: 53 DEGREES
CHLORIDE SERPL-SCNC: 107 MMOL/L (ref 97–108)
CO2 SERPL-SCNC: 22 MMOL/L (ref 21–32)
COLOR UR: ABNORMAL
COMMENT, HOLDF: NORMAL
CREAT SERPL-MCNC: 1.3 MG/DL (ref 0.55–1.02)
DIAGNOSIS, 93000: NORMAL
DIFFERENTIAL METHOD BLD: ABNORMAL
EOSINOPHIL # BLD: 0.1 K/UL (ref 0–0.4)
EOSINOPHIL NFR BLD: 2 % (ref 0–7)
EPITH CASTS URNS QL MICRO: ABNORMAL /LPF
ERYTHROCYTE [DISTWIDTH] IN BLOOD BY AUTOMATED COUNT: 15.2 % (ref 11.5–14.5)
EST. AVERAGE GLUCOSE BLD GHB EST-MCNC: 126 MG/DL
GLOBULIN SER CALC-MCNC: 3.4 G/DL (ref 2–4)
GLUCOSE SERPL-MCNC: 185 MG/DL (ref 65–100)
GLUCOSE UR STRIP.AUTO-MCNC: NEGATIVE MG/DL
HBA1C MFR BLD: 6 % (ref 4–5.6)
HCT VFR BLD AUTO: 28 % (ref 35–47)
HGB BLD-MCNC: 9.6 G/DL (ref 11.5–16)
HGB UR QL STRIP: ABNORMAL
IMM GRANULOCYTES # BLD AUTO: 0 K/UL (ref 0–0.04)
IMM GRANULOCYTES NFR BLD AUTO: 0 % (ref 0–0.5)
KETONES UR QL STRIP.AUTO: NEGATIVE MG/DL
LEUKOCYTE ESTERASE UR QL STRIP.AUTO: ABNORMAL
LYMPHOCYTES # BLD: 1.5 K/UL (ref 0.8–3.5)
LYMPHOCYTES NFR BLD: 27 % (ref 12–49)
MCH RBC QN AUTO: 36.8 PG (ref 26–34)
MCHC RBC AUTO-ENTMCNC: 34.3 G/DL (ref 30–36.5)
MCV RBC AUTO: 107.3 FL (ref 80–99)
MONOCYTES # BLD: 0.3 K/UL (ref 0–1)
MONOCYTES NFR BLD: 6 % (ref 5–13)
NEUTS SEG # BLD: 3.6 K/UL (ref 1.8–8)
NEUTS SEG NFR BLD: 64 % (ref 32–75)
NITRITE UR QL STRIP.AUTO: NEGATIVE
NRBC # BLD: 0 K/UL (ref 0–0.01)
NRBC BLD-RTO: 0 PER 100 WBC
PH UR STRIP: 7 [PH] (ref 5–8)
PLATELET # BLD AUTO: 263 K/UL (ref 150–400)
PMV BLD AUTO: 11.6 FL (ref 8.9–12.9)
POTASSIUM SERPL-SCNC: 4.1 MMOL/L (ref 3.5–5.1)
PROT SERPL-MCNC: 6.8 G/DL (ref 6.4–8.2)
PROT UR STRIP-MCNC: NEGATIVE MG/DL
Q-T INTERVAL, ECG07: 428 MS
QRS DURATION, ECG06: 128 MS
QTC CALCULATION (BEZET), ECG08: 515 MS
RBC # BLD AUTO: 2.61 M/UL (ref 3.8–5.2)
RBC #/AREA URNS HPF: ABNORMAL /HPF (ref 0–5)
SAMPLES BEING HELD,HOLD: NORMAL
SODIUM SERPL-SCNC: 137 MMOL/L (ref 136–145)
SP GR UR REFRACTOMETRY: 1 (ref 1–1.03)
TROPONIN I SERPL-MCNC: <0.05 NG/ML
UR CULT HOLD, URHOLD: NORMAL
UROBILINOGEN UR QL STRIP.AUTO: 0.2 EU/DL (ref 0.2–1)
VENTRICULAR RATE, ECG03: 87 BPM
WBC # BLD AUTO: 5.7 K/UL (ref 3.6–11)
WBC URNS QL MICRO: >100 /HPF (ref 0–4)

## 2019-12-19 PROCEDURE — 93306 TTE W/DOPPLER COMPLETE: CPT

## 2019-12-19 PROCEDURE — 74011250637 HC RX REV CODE- 250/637: Performed by: EMERGENCY MEDICINE

## 2019-12-19 PROCEDURE — 93005 ELECTROCARDIOGRAM TRACING: CPT

## 2019-12-19 PROCEDURE — 74011250637 HC RX REV CODE- 250/637: Performed by: STUDENT IN AN ORGANIZED HEALTH CARE EDUCATION/TRAINING PROGRAM

## 2019-12-19 PROCEDURE — 81001 URINALYSIS AUTO W/SCOPE: CPT

## 2019-12-19 PROCEDURE — 87186 SC STD MICRODIL/AGAR DIL: CPT

## 2019-12-19 PROCEDURE — 99285 EMERGENCY DEPT VISIT HI MDM: CPT

## 2019-12-19 PROCEDURE — 84484 ASSAY OF TROPONIN QUANT: CPT

## 2019-12-19 PROCEDURE — 71045 X-RAY EXAM CHEST 1 VIEW: CPT

## 2019-12-19 PROCEDURE — 80053 COMPREHEN METABOLIC PANEL: CPT

## 2019-12-19 PROCEDURE — 87077 CULTURE AEROBIC IDENTIFY: CPT

## 2019-12-19 PROCEDURE — 87086 URINE CULTURE/COLONY COUNT: CPT

## 2019-12-19 PROCEDURE — 83880 ASSAY OF NATRIURETIC PEPTIDE: CPT

## 2019-12-19 PROCEDURE — 85025 COMPLETE CBC W/AUTO DIFF WBC: CPT

## 2019-12-19 PROCEDURE — 83036 HEMOGLOBIN GLYCOSYLATED A1C: CPT

## 2019-12-19 PROCEDURE — 36415 COLL VENOUS BLD VENIPUNCTURE: CPT

## 2019-12-19 RX ORDER — CARVEDILOL 6.25 MG/1
6.25 TABLET ORAL 2 TIMES DAILY WITH MEALS
Status: DISCONTINUED | OUTPATIENT
Start: 2019-12-19 | End: 2019-12-19 | Stop reason: HOSPADM

## 2019-12-19 RX ORDER — CEPHALEXIN 250 MG/1
250 CAPSULE ORAL ONCE
Status: COMPLETED | OUTPATIENT
Start: 2019-12-19 | End: 2019-12-19

## 2019-12-19 RX ORDER — CEPHALEXIN 250 MG/1
250 CAPSULE ORAL 2 TIMES DAILY
Qty: 20 CAP | Refills: 0 | Status: SHIPPED | OUTPATIENT
Start: 2019-12-19 | End: 2019-12-27

## 2019-12-19 RX ORDER — NITROFURANTOIN 25; 75 MG/1; MG/1
100 CAPSULE ORAL
Status: DISCONTINUED | OUTPATIENT
Start: 2019-12-19 | End: 2019-12-19

## 2019-12-19 RX ORDER — LOSARTAN POTASSIUM 50 MG/1
25 TABLET ORAL EVERY OTHER DAY
Status: DISCONTINUED | OUTPATIENT
Start: 2019-12-19 | End: 2019-12-19 | Stop reason: HOSPADM

## 2019-12-19 RX ADMIN — LOSARTAN POTASSIUM 25 MG: 50 TABLET, FILM COATED ORAL at 15:33

## 2019-12-19 RX ADMIN — CARVEDILOL 6.25 MG: 6.25 TABLET, FILM COATED ORAL at 15:34

## 2019-12-19 RX ADMIN — CEPHALEXIN 250 MG: 250 CAPSULE ORAL at 15:04

## 2019-12-19 NOTE — ED NOTES
Patient rang out and said, \"I need the doctor to give me something for anxiety. I want a pill. I need something. I'm going to scream.\" Patient coached in deep breathing. Dr. Pleas Mohs made aware.

## 2019-12-19 NOTE — PROGRESS NOTES
11:29 AM  Reason for Admission:   Not feeling well, no diagnosis at this time               RRAT Score:     18             Resources/supports as identified by patient/family:   Has a nurse who comes to the house 7 days a week for three hours. The nurse provides insulin treatment, care, cleaning and grocery shopping                Top Challenges facing patient (as identified by patient/family and CM): Finances/Medication cost?    Uses [facility] for rx needs or mail order medications                Transportation? Aide or friend Fei Granda transports for errands. Pt came to hospital in an ambulance              Support system or lack thereof? Has a friend in [facility] and three children who live in [place], [place] and [place]                     Living arrangements? One story entry level home in [facility]             Self-care/ADLs/Cognition? Requires assistance with some care, is able to ambulate with a walker          Current Advanced Directive/Advance Care Plan:  Not one file, Mallory Gan is her emergency contact and can be reached at [phone]                          Plan for utilizing home health:  Used home health following 30 day stay at [address] (August 2019). No longer receiving home health- Believes it was [address] [facility]                 Transition of Care Plan:   Pt plans to return home with assistance from nurse/aide. Per friend, pt is not interested in moving to another level of care at [address]. 1). Explore increase in care in home   2). Follow for dc needs  3).  Gave information on dispatch health and educated on service    Care Management Interventions  PCP Verified by CM: (S) Yes(Saw one month ago, No NN)  Mode of Transport at Discharge: (Rajwinder Burnett if available at time of dc)  Current Support Network: Lives Alone(Resides at [address] in a cottage-independent living)  Confirm Follow Up Transport: Friends  Discharge Location  Discharge Placement: Home

## 2019-12-19 NOTE — ED TRIAGE NOTES
Patient does not feel well, and is feeling short of breath. Was 99% on room air, medic placed patient on nasal cannula for comfort.   No acute distress, patient skin is warm and pink

## 2019-12-19 NOTE — DISCHARGE INSTRUCTIONS
Patient Education        Urinary Tract Infection in Women: Care Instructions  Your Care Instructions    A urinary tract infection, or UTI, is a general term for an infection anywhere between the kidneys and the urethra (where urine comes out). Most UTIs are bladder infections. They often cause pain or burning when you urinate. UTIs are caused by bacteria and can be cured with antibiotics. Be sure to complete your treatment so that the infection goes away. Follow-up care is a key part of your treatment and safety. Be sure to make and go to all appointments, and call your doctor if you are having problems. It's also a good idea to know your test results and keep a list of the medicines you take. How can you care for yourself at home? · Take your antibiotics as directed. Do not stop taking them just because you feel better. You need to take the full course of antibiotics. · Drink extra water and other fluids for the next day or two. This may help wash out the bacteria that are causing the infection. (If you have kidney, heart, or liver disease and have to limit fluids, talk with your doctor before you increase your fluid intake.)  · Avoid drinks that are carbonated or have caffeine. They can irritate the bladder. · Urinate often. Try to empty your bladder each time. · To relieve pain, take a hot bath or lay a heating pad set on low over your lower belly or genital area. Never go to sleep with a heating pad in place. To prevent UTIs  · Drink plenty of water each day. This helps you urinate often, which clears bacteria from your system. (If you have kidney, heart, or liver disease and have to limit fluids, talk with your doctor before you increase your fluid intake.)  · Urinate when you need to. · Urinate right after you have sex. · Change sanitary pads often. · Avoid douches, bubble baths, feminine hygiene sprays, and other feminine hygiene products that have deodorants.   · After going to the bathroom, wipe from front to back. When should you call for help? Call your doctor now or seek immediate medical care if:    · Symptoms such as fever, chills, nausea, or vomiting get worse or appear for the first time.     · You have new pain in your back just below your rib cage. This is called flank pain.     · There is new blood or pus in your urine.     · You have any problems with your antibiotic medicine.    Watch closely for changes in your health, and be sure to contact your doctor if:    · You are not getting better after taking an antibiotic for 2 days.     · Your symptoms go away but then come back. Where can you learn more? Go to http://smita-armand.info/. Enter L912 in the search box to learn more about \"Urinary Tract Infection in Women: Care Instructions. \"  Current as of: December 19, 2018  Content Version: 12.2  © 7599-4525 [employer]. Care instructions adapted under license by "[employer] (which disclaims liability or warranty for this information). If you have questions about a medical condition or this instruction, always ask your healthcare professional. [place] [address] any warranty or liability for your use of this information. Patient Education        Shortness of Breath: Care Instructions  Your Care Instructions  Shortness of breath has many causes. Sometimes conditions such as anxiety can lead to shortness of breath. Some people get mild shortness of breath when they exercise. Trouble breathing also can be a symptom of a serious problem, such as asthma, lung disease, emphysema, heart problems, and pneumonia. If your shortness of breath continues, you may need tests and treatment. Watch for any changes in your breathing and other symptoms. Follow-up care is a key part of your treatment and safety. Be sure to make and go to all appointments, and call your doctor if you are having problems.  It's also a good idea to know your test results and keep a list of the medicines you take. How can you care for yourself at home? · Do not smoke or allow others to smoke around you. If you need help quitting, talk to your doctor about stop-smoking programs and medicines. These can increase your chances of quitting for good. · Get plenty of rest and sleep. · Take your medicines exactly as prescribed. Call your doctor if you think you are having a problem with your medicine. · Find healthy ways to deal with stress. ? Exercise daily. ? Get plenty of sleep. ? Eat regularly and well. When should you call for help? Call 911 anytime you think you may need emergency care. For example, call if:    · You have severe shortness of breath.     · You have symptoms of a heart attack. These may include:  ? Chest pain or pressure, or a strange feeling in the chest.  ? Sweating. ? Shortness of breath. ? Nausea or vomiting. ? Pain, pressure, or a strange feeling in the back, neck, jaw, or upper belly or in one or both shoulders or arms. ? Lightheadedness or sudden weakness. ? A fast or irregular heartbeat. After you call 911, the  may tell you to chew 1 adult-strength or 2 to 4 low-dose aspirin. Wait for an ambulance. Do not try to drive yourself.    Call your doctor now or seek immediate medical care if:    · Your shortness of breath gets worse or you start to wheeze. Wheezing is a high-pitched sound when you breathe.     · You wake up at night out of breath or have to prop your head up on several pillows to breathe.     · You are short of breath after only light activity or while at rest.    Watch closely for changes in your health, and be sure to contact your doctor if:    · You do not get better over the next 1 to 2 days. Where can you learn more? Go to http://smita-armand.info/. Enter S780 in the search box to learn more about \"Shortness of Breath: Care Instructions. \"  Current as of: June 9, 2019  Content Version: 12.2  © 3602-9459 [employer]. Care instructions adapted under license by [employer] (which disclaims liability or warranty for this information). If you have questions about a medical condition or this instruction, always ask your healthcare professional. [address] any warranty or liability for your use of this information.

## 2019-12-19 NOTE — ED NOTES
2:57 PM  Change of shift. Care of patient taken over from Dr Foreign Ayala; H&P reviewed, bedside handoff complete. Awaiting cardiology evaluation;     5:02 PM  Pt has no c/o; awaiting cardiology evaluation;     CONSULT  NOTE  5:40 PM  Michelle Quintana MD spoke with Dr Elo Mobley. Specialty: Cardiology  Discussed pt's hx, disposition, and available diagnostic and imaging results. Reviewed care plans. Consulting physician agrees with plans as outlined '[id number] to d/c home, we will set up echo as OP'; .        5:40 PM  Va Schmid's  results have been reviewed with her. She has been counseled regarding her diagnosis. She verbally conveys understanding and agreement of the signs, symptoms, diagnosis, treatment and prognosis and additionally agrees to Call/ Arrange follow up as recommended with Dr. Kristi Aponte MD in 24 - 48 hours. She also agrees with the care-plan and conveys that all of her questions have been answered. I have also put together some discharge instructions for her that include: 1) educational information regarding their diagnosis, 2) how to care for their diagnosis at home, as well a 3) list of reasons why they would want to return to the ED prior to their follow-up appointment, should their condition change or for concerns.

## 2019-12-19 NOTE — CONSULTS
Patient: Ryne Westfall  : 1928    Today's Date: 2019    HISTORY OF PRESENT ILLNESS:     History of Present Illness:    Ryne Westfall is a 80 y.o. female significant cardiac hx to include DM II, Ischemic Cardiomyopathy, HFrEF s/p ICD placement, HTN and AMIRA presenting to hospital with SOB. Pt states she had been doing well until earlier this morning when she noticed dyspnea with her morning adls. She denies any associated chest pain, cough, palpitations, pnd, LE edema or orthopnea. Denies any abdominal pain, nausea, vomiting, fevers, chills or night sweats. No recent travel or sick contacts.       PAST MEDICAL HISTORY:     Past Medical History:   Diagnosis Date    Arthritis     [facility] CA [facility]     Breast cancer ([facility]     Diabetes ([facility]     Diarrhea     chronic     Dyslipidemia     GERD (gastroesophageal reflux disease)     Hearing loss     Hypertension     LBBB (left bundle branch block)     chronic LBBB    Melanoma ([facility]     Non-ischemic cardiomyopathy ([facility]     Cath 2/16/15 - Mild (non-obstructive) CAD,LVEF 30%, 2+ MR    Seizures (HCC)     X1 post partum after first child was born- no more seizures    Skin cancer     melanoma on legs    Sleep apnea     wears O2 at night- cannot tolerate CPAP    Snoring     SOB (shortness of breath)     Systolic CHF ([facility]     Admission 16    Thyroid disease        Past Surgical History:   Procedure Laterality Date    HX BREAST LUMPECTOMY  2003    right    HX CATARACT REMOVAL      bilateral    HX CHOLECYSTECTOMY      HX DILATION AND CURETTAGE  1960    HX HEART CATHETERIZATION      x2- no blockages    HX HEENT      surgery on upper jaw    HX HYSTERECTOMY  1967    HX HYSTEROSCOPY      HX OOPHORECTOMY  1967    left    HX ORTHOPAEDIC  2010    right knee replacement    HX ORTHOPAEDIC      repair torn meniscus left knee    HX ORTHOPAEDIC      repair knuckle on left pinky finger    HX OTHER SURGICAL  2000    remove benign tumore right side between rib and right lung    HX PACEMAKER      HX PARTIAL THYROIDECTOMY      HX UROLOGICAL  2003    bladder surgery x2         MEDICATIONS:     Current Facility-Administered Medications   Medication Dose Route Frequency Provider Last Rate Last Dose    carvedilol (COREG) tablet 6.25 mg  6.25 mg Oral BID WITH MEALS Taniya Infante MD        losartan (COZAAR) tablet 25 mg  25 mg Oral EVERY OTHER DAY Taniya Infante MD         Current Outpatient Medications   Medication Sig Dispense Refill    cephALEXin (KEFLEX) 250 mg capsule Take 1 Cap by mouth two (2) times a day for 10 days. 20 Cap 0    polyethylene glycol (MIRALAX) 17 gram packet Take 17 g by mouth daily.  levothyroxine (SYNTHROID) 50 mcg tablet Take  by mouth Daily (before breakfast).  insulin glargine (LANTUS U-100 INSULIN) 100 unit/mL injection 22 Units by SubCUTAneous route every evening.  SITagliptin (JANUVIA) 50 mg tablet Take 50 mg by mouth daily.  bumetanide (BUMEX) 1 mg tablet Take 1 Tab by mouth daily. 90 Tab 3    losartan (COZAAR) 25 mg tablet Take 25 mg by mouth every other day.  calcium carbonate (TUMS) 200 mg calcium (500 mg) chew Take 1 Tab by mouth three (3) times daily as needed (GERD).  carvedilol (COREG) 6.25 mg tablet TAKE 1 TABLET TWICE A DAY WITH MEALS 180 Tab 0    co-enzyme Q-10 (CO Q-10) 50 mg capsule Take 50 mg by mouth daily (after lunch).  sertraline (ZOLOFT) 100 mg tablet Take 100 mg by mouth daily.  ergocalciferol (VITAMIN D2) 50,000 unit capsule Take 50,000 Units by mouth every seven (7) days.  melatonin 10 mg cap Take 10 mg by mouth nightly.  multivitamin (ONE A DAY) tablet Take 1 Tab by mouth daily (after lunch).  DOCOSAHEXANOIC ACID/EPA (FISH OIL PO) Take 1 Tab by mouth daily (after lunch).          Allergies   Allergen Reactions    Beta-Blockers (Beta-Adrenergic Blocking Agts) Unknown (comments)     Made her feel tired, breakout on her back    Other Medication Unknown (comments)     Bromides    Phenobarbital Unknown (comments)    Sulfa (Sulfonamide Antibiotics) Swelling     rash         SOCIAL HISTORY:     Social History     Tobacco Use    Smoking status: Never Smoker    Smokeless tobacco: Never Used   Substance Use Topics    Alcohol use: Yes     Alcohol/week: 4.0 standard drinks     Types: 3 Glasses of wine, 1 Shots of liquor per week    Drug use: No         FAMILY HISTORY:     Family History   Problem Relation Age of Onset    Stroke Mother          REVIEW OF SYMPTOMS:     Review of Symptoms:  Constitutional: Negative for fever, chills  HEENT: Negative for nosebleeds, tinnitus, and vision changes. Respiratory: Negative for cough, wheezing  Cardiovascular: Negative for orthopnea, claudication, syncope, and PND. Gastrointestinal: Negative for abdominal pain, diarrhea, melena. Genitourinary: Negative for dysuria  Musculoskeletal: Negative for myalgias. Skin: Negative for rash  Heme: No problems bleeding. Neurological: Negative for speech change and focal weakness. PHYSICAL EXAM:     Physical Exam:  Visit Vitals  BP (!) 153/133   Pulse 79   Temp 98.4 °F (36.9 °C)   Resp 22   Ht 5' 4\" (1.626 m)   Wt 172 lb (78 kg)   SpO2 97%   BMI 29.52 kg/m²     Patient appears generally well, mood and affect are appropriate and pleasant. HEENT:  Hearing intact, non-icteric, normocephalic, atraumatic. Neck Exam: Supple, No JVD or carotid bruits. Lung Exam: Clear to auscultation, even breath sounds. Cardiac Exam: Regular rate and rhythm with no murmur  Abdomen: Soft, non-tender, normal bowel sounds. No bruits or masses. Extremities: Moves all ext well. No lower extremity edema. Vascular: 2+ dorsalis pedis pulses bilaterally.   Psych: Appropriate affect  Neuro - Grossly intact      LABS / OTHER STUDIES:     Recent Results (from the past 24 hour(s))   CBC WITH AUTOMATED DIFF    Collection Time: 12/19/19 11:16 AM   Result Value Ref Range    WBC 5.7 3.6 - 11.0 K/uL    RBC 2.61 (L) 3.80 - 5.20 M/uL    HGB 9.6 (L) 11.5 - 16.0 g/dL    HCT 28.0 (L) 35.0 - 47.0 %    .3 (H) 80.0 - 99.0 FL    MCH 36.8 (H) 26.0 - 34.0 PG    MCHC 34.3 30.0 - 36.5 g/dL    RDW 15.2 (H) 11.5 - 14.5 %    PLATELET 598 037 - 191 K/uL    MPV 11.6 8.9 - 12.9 FL    NRBC 0.0 0  WBC    ABSOLUTE NRBC 0.00 0.00 - 0.01 K/uL    NEUTROPHILS 64 32 - 75 %    LYMPHOCYTES 27 12 - 49 %    MONOCYTES 6 5 - 13 %    EOSINOPHILS 2 0 - 7 %    BASOPHILS 1 0 - 1 %    IMMATURE GRANULOCYTES 0 0.0 - 0.5 %    ABS. NEUTROPHILS 3.6 1.8 - 8.0 K/UL    ABS. LYMPHOCYTES 1.5 0.8 - 3.5 K/UL    ABS. MONOCYTES 0.3 0.0 - 1.0 K/UL    ABS. EOSINOPHILS 0.1 0.0 - 0.4 K/UL    ABS. BASOPHILS 0.0 0.0 - 0.1 K/UL    ABS. IMM. GRANS. 0.0 0.00 - 0.04 K/UL    DF AUTOMATED     METABOLIC PANEL, COMPREHENSIVE    Collection Time: 12/19/19 11:16 AM   Result Value Ref Range    Sodium 137 136 - 145 mmol/L    Potassium 4.1 3.5 - 5.1 mmol/L    Chloride 107 97 - 108 mmol/L    CO2 22 21 - 32 mmol/L    Anion gap 8 5 - 15 mmol/L    Glucose 185 (H) 65 - 100 mg/dL    BUN 39 (H) 6 - 20 MG/DL    Creatinine 1.30 (H) 0.55 - 1.02 MG/DL    BUN/Creatinine ratio 30 (H) 12 - 20      GFR est AA 47 (L) >60 ml/min/1.73m2    GFR est non-AA 38 (L) >60 ml/min/1.73m2    Calcium 9.0 8.5 - 10.1 MG/DL    Bilirubin, total 0.4 0.2 - 1.0 MG/DL    ALT (SGPT) 18 12 - 78 U/L    AST (SGOT) 12 (L) 15 - 37 U/L    Alk.  phosphatase 56 45 - 117 U/L    Protein, total 6.8 6.4 - 8.2 g/dL    Albumin 3.4 (L) 3.5 - 5.0 g/dL    Globulin 3.4 2.0 - 4.0 g/dL    A-G Ratio 1.0 (L) 1.1 - 2.2     TROPONIN I    Collection Time: 12/19/19 11:16 AM   Result Value Ref Range    Troponin-I, Qt. <0.05 <0.05 ng/mL   NT-PRO BNP    Collection Time: 12/19/19 11:16 AM   Result Value Ref Range    NT pro-BNP 1,129 (H) <450 PG/ML   SAMPLES BEING HELD    Collection Time: 12/19/19 11:16 AM   Result Value Ref Range    SAMPLES BEING HELD 1SST,1RED,1DRK GRN, 1BLU     COMMENT        Add-on orders for these samples will be processed based on acceptable specimen integrity and analyte stability, which may vary by analyte. URINALYSIS W/MICROSCOPIC    Collection Time: 12/19/19 12:32 PM   Result Value Ref Range    Color YELLOW/STRAW      Appearance CLOUDY (A) CLEAR      Specific gravity 1.005 1.003 - 1.030      pH (UA) 7.0 5.0 - 8.0      Protein NEGATIVE  NEG mg/dL    Glucose NEGATIVE  NEG mg/dL    Ketone NEGATIVE  NEG mg/dL    Bilirubin NEGATIVE  NEG      Blood TRACE (A) NEG      Urobilinogen 0.2 0.2 - 1.0 EU/dL    Nitrites NEGATIVE  NEG      Leukocyte Esterase LARGE (A) NEG      WBC >100 (H) 0 - 4 /hpf    RBC 5-10 0 - 5 /hpf    Epithelial cells FEW FEW /lpf    Bacteria 3+ (A) NEG /hpf   URINE CULTURE HOLD SAMPLE    Collection Time: 12/19/19 12:32 PM   Result Value Ref Range    Urine culture hold        URINE ON HOLD IN MICROBIOLOGY DEPT FOR 3 DAYS. IF UNPRESERVED URINE IS SUBMITTED, IT CANNOT BE USED FOR ADDITIONAL TESTING AFTER 24 HRS, RECOLLECTION WILL BE REQUIRED. CARDIAC DIAGNOSTICS:     Cardiac Evaluation Includes:    ECHO 03/28/58: Systolic function was mildly reduced by EF (biplane method  of disks). Ejection fraction was estimated to be 45 %. There was mild  diffuse hypokinesis. Doppler parameters were consistent with abnormal left  ventricular relaxation (grade 1 diastolic dysfunction).     Echo 2/13/16 - TDS.   LVEF 30-35%.   There was moderate diffuse hypokinesis - Only the basal anterolateral and infrolateral walls contracts normally.  RV size and function normal.  RVSP 35      CTA chest 2/13/16 - No evidence of pulmonary embolus. Mild pulmonary interstitial edema and small bilateral pleural effusions.  Small bilateral indeterminate pulmonary nodules, measuring up to 4 mm.      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 Moderate to severe deep white matter ischemic change. 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 No new confluent infarct or hemorrhage or mass effect.      Echo 2/8/18  - LVEF 50%, grade 1 diastology, RV normal, RVSP 35     Echo 11/15/18 - LVEF 45%         EKG 12/1/15 - NSR, Nonspecific intraventricular block,   EKG 1/3/18 - V paced             ASSESSMENT AND PLAN:     Assessment and Plan:    1) SOB   - Low suspicion for cardiac origin at this time. Deconditioning vs infectious origin.   - No acute signs of volume overload this time. - Hold diuretics for concerns of over diuresis    2) HFrEF   - Not acutely decompensated. BNP at baseline   - Hold bumex and monitor volume status closely   - AM BMP. - Resume Bumex at 0.5mg daily at discharge and plan for close o/p follow-up   - Repeat ECHO pending. 3) HTN   - Resume home coreg and Losartan. Hold Bumex    4) IDDM II   - Repeat A1C.   - Continue with Insulin    5) Ischemic Cardiomyopathy / CAD   - Continue Coreg and Statin      Resident: Luis Enrique Smith MD   Pt seen and discussed with Dr. Shikha Sutherland. Attending addendum to follow. [address] [address], [address]  [place] [address]  [address]  [address]  Ph: [phone]   Ph [phone]

## 2019-12-19 NOTE — ED NOTES
Patient called out for anxiety, reports restless. Reported to [address]. Continue to monitor.

## 2019-12-20 LAB
AV VELOCITY RATIO: 0.63
ECHO AO ROOT DIAM: 3.29 CM
ECHO AV AREA PEAK VELOCITY: 1.5 CM2
ECHO AV AREA/BSA PEAK VELOCITY: 0.8 CM2/M2
ECHO AV PEAK GRADIENT: 5.8 MMHG
ECHO AV PEAK VELOCITY: 120.5 CM/S
ECHO EST RA PRESSURE: 3 MMHG
ECHO LA MAJOR AXIS: 3.03 CM
ECHO LA TO AORTIC ROOT RATIO: 0.92
ECHO LA VOL 2C: 32.9 ML (ref 22–52)
ECHO LA VOL 4C: 31.4 ML (ref 22–52)
ECHO LA VOLUME INDEX A2C: 17.94 ML/M2 (ref 16–28)
ECHO LA VOLUME INDEX A4C: 17.12 ML/M2 (ref 16–28)
ECHO LV EDV TEICHHOLZ: 0.6 ML
ECHO LV ESV TEICHHOLZ: 0.39 ML
ECHO LV INTERNAL DIMENSION DIASTOLIC: 4.59 CM (ref 3.9–5.3)
ECHO LV INTERNAL DIMENSION SYSTOLIC: 3.84 CM
ECHO LV IVSD: 0.78 CM (ref 0.6–0.9)
ECHO LV MASS 2D: 133.2 G (ref 67–162)
ECHO LV MASS INDEX 2D: 72.6 G/M2 (ref 43–95)
ECHO LV POSTERIOR WALL DIASTOLIC: 0.83 CM (ref 0.6–0.9)
ECHO LVOT DIAM: 1.77 CM
ECHO LVOT PEAK GRADIENT: 2.3 MMHG
ECHO LVOT PEAK VELOCITY: 75.7 CM/S
ECHO MV A VELOCITY: 112.52 CM/S
ECHO MV E DECELERATION TIME (DT): 153.7 MS
ECHO MV E VELOCITY: 68.66 CM/S
ECHO MV E/A RATIO: 0.61
ECHO PULMONARY ARTERY SYSTOLIC PRESSURE (PASP): 33.3 MMHG
ECHO PV MAX VELOCITY: 124.54 CM/S
ECHO PV PEAK GRADIENT: 6.2 MMHG
ECHO RIGHT VENTRICULAR SYSTOLIC PRESSURE (RVSP): 33.3 MMHG
ECHO TV REGURGITANT MAX VELOCITY: 275.36 CM/S
ECHO TV REGURGITANT PEAK GRADIENT: 30.3 MMHG
LVFS 2D: 16.37 %
LVSV (TEICH): 17.77 ML
MV DEC SLOPE: 4.47

## 2019-12-22 LAB
BACTERIA SPEC CULT: ABNORMAL
CC UR VC: ABNORMAL
SERVICE CMNT-IMP: ABNORMAL

## 2019-12-23 RX ORDER — CEFUROXIME AXETIL 500 MG/1
500 TABLET ORAL 2 TIMES DAILY
Qty: 14 TAB | Refills: 0 | Status: SHIPPED | OUTPATIENT
Start: 2019-12-23 | End: 2019-12-27

## 2019-12-24 NOTE — PROGRESS NOTES
I spoke with patient. She is improving but not all the way. Discussed antibiotic.   Will stop keflex    ERX for ceftin 500 mg bid x 7 d to food [facility]

## 2019-12-26 ENCOUNTER — APPOINTMENT (OUTPATIENT)
Dept: CT IMAGING | Age: 84
End: 2019-12-26
Attending: EMERGENCY MEDICINE
Payer: MEDICARE

## 2019-12-26 ENCOUNTER — APPOINTMENT (OUTPATIENT)
Dept: GENERAL RADIOLOGY | Age: 84
End: 2019-12-26
Attending: EMERGENCY MEDICINE
Payer: MEDICARE

## 2019-12-26 ENCOUNTER — HOSPITAL ENCOUNTER (OUTPATIENT)
Age: 84
Setting detail: OBSERVATION
Discharge: HOME | End: 2019-12-27
Attending: EMERGENCY MEDICINE | Admitting: INTERNAL MEDICINE
Payer: MEDICARE

## 2019-12-26 DIAGNOSIS — F03.90 DEMENTIA WITHOUT BEHAVIORAL DISTURBANCE, UNSPECIFIED DEMENTIA TYPE: ICD-10-CM

## 2019-12-26 DIAGNOSIS — R06.02 SOB (SHORTNESS OF BREATH): ICD-10-CM

## 2019-12-26 DIAGNOSIS — R41.82 ALTERED MENTAL STATUS, UNSPECIFIED ALTERED MENTAL STATUS TYPE: Primary | ICD-10-CM

## 2019-12-26 DIAGNOSIS — I10 ESSENTIAL HYPERTENSION: ICD-10-CM

## 2019-12-26 DIAGNOSIS — I50.22 CHRONIC SYSTOLIC HEART FAILURE (HCC): ICD-10-CM

## 2019-12-26 PROBLEM — N39.0 UTI (URINARY TRACT INFECTION): Status: RESOLVED | Noted: 2019-07-31 | Resolved: 2019-12-26

## 2019-12-26 PROBLEM — E07.9 THYROID DISEASE: Status: RESOLVED | Noted: 2019-07-31 | Resolved: 2019-12-26

## 2019-12-26 PROBLEM — R19.7 DIARRHEA: Status: RESOLVED | Noted: 2019-07-31 | Resolved: 2019-12-26

## 2019-12-26 PROBLEM — C50.919 BREAST CA (HCC): Status: RESOLVED | Noted: 2019-07-31 | Resolved: 2019-12-26

## 2019-12-26 PROBLEM — D64.9 ANEMIA: Status: RESOLVED | Noted: 2019-07-31 | Resolved: 2019-12-26

## 2019-12-26 PROBLEM — R06.00 DYSPNEA: Status: ACTIVE | Noted: 2019-12-26

## 2019-12-26 LAB
ALBUMIN SERPL-MCNC: 3.4 G/DL (ref 3.5–5)
ALBUMIN/GLOB SERPL: 1 {RATIO} (ref 1.1–2.2)
ALP SERPL-CCNC: 56 U/L (ref 45–117)
ALT SERPL-CCNC: 18 U/L (ref 12–78)
ANION GAP SERPL CALC-SCNC: 10 MMOL/L (ref 5–15)
APPEARANCE UR: CLEAR
AST SERPL-CCNC: 16 U/L (ref 15–37)
ATRIAL RATE: 84 BPM
B PERT DNA SPEC QL NAA+PROBE: NOT DETECTED
BACTERIA URNS QL MICRO: NEGATIVE /HPF
BASOPHILS # BLD: 0.1 K/UL (ref 0–0.1)
BASOPHILS NFR BLD: 1 % (ref 0–1)
BILIRUB SERPL-MCNC: 0.5 MG/DL (ref 0.2–1)
BILIRUB UR QL: NEGATIVE
BNP SERPL-MCNC: 538 PG/ML
BORDETELLA PARAPERTUSSIS PCR, BORPAR: NOT DETECTED
BUN SERPL-MCNC: 38 MG/DL (ref 6–20)
BUN/CREAT SERPL: 26 (ref 12–20)
C PNEUM DNA SPEC QL NAA+PROBE: NOT DETECTED
CALCIUM SERPL-MCNC: 8.4 MG/DL (ref 8.5–10.1)
CALCULATED R AXIS, ECG10: 122 DEGREES
CALCULATED T AXIS, ECG11: -21 DEGREES
CHLORIDE SERPL-SCNC: 103 MMOL/L (ref 97–108)
CK SERPL-CCNC: 108 U/L (ref 26–192)
CO2 SERPL-SCNC: 23 MMOL/L (ref 21–32)
COLOR UR: NORMAL
COMMENT, HOLDF: NORMAL
COMMENT, HOLDF: NORMAL
CREAT SERPL-MCNC: 1.46 MG/DL (ref 0.55–1.02)
DIAGNOSIS, 93000: NORMAL
DIFFERENTIAL METHOD BLD: ABNORMAL
EOSINOPHIL # BLD: 0.1 K/UL (ref 0–0.4)
EOSINOPHIL NFR BLD: 1 % (ref 0–7)
EPITH CASTS URNS QL MICRO: NORMAL /LPF
ERYTHROCYTE [DISTWIDTH] IN BLOOD BY AUTOMATED COUNT: 14.9 % (ref 11.5–14.5)
FLUAV H1 2009 PAND RNA SPEC QL NAA+PROBE: NOT DETECTED
FLUAV H1 RNA SPEC QL NAA+PROBE: NOT DETECTED
FLUAV H3 RNA SPEC QL NAA+PROBE: NOT DETECTED
FLUAV SUBTYP SPEC NAA+PROBE: NOT DETECTED
FLUBV RNA SPEC QL NAA+PROBE: NOT DETECTED
GLOBULIN SER CALC-MCNC: 3.3 G/DL (ref 2–4)
GLUCOSE BLD STRIP.AUTO-MCNC: 127 MG/DL (ref 65–100)
GLUCOSE BLD STRIP.AUTO-MCNC: 223 MG/DL (ref 65–100)
GLUCOSE SERPL-MCNC: 162 MG/DL (ref 65–100)
GLUCOSE UR STRIP.AUTO-MCNC: NEGATIVE MG/DL
HADV DNA SPEC QL NAA+PROBE: NOT DETECTED
HCOV 229E RNA SPEC QL NAA+PROBE: NOT DETECTED
HCOV HKU1 RNA SPEC QL NAA+PROBE: NOT DETECTED
HCOV NL63 RNA SPEC QL NAA+PROBE: NOT DETECTED
HCOV OC43 RNA SPEC QL NAA+PROBE: NOT DETECTED
HCT VFR BLD AUTO: 27.3 % (ref 35–47)
HGB BLD-MCNC: 9.3 G/DL (ref 11.5–16)
HGB UR QL STRIP: NEGATIVE
HMPV RNA SPEC QL NAA+PROBE: NOT DETECTED
HPIV1 RNA SPEC QL NAA+PROBE: NOT DETECTED
HPIV2 RNA SPEC QL NAA+PROBE: NOT DETECTED
HPIV3 RNA SPEC QL NAA+PROBE: NOT DETECTED
HPIV4 RNA SPEC QL NAA+PROBE: NOT DETECTED
HYALINE CASTS URNS QL MICRO: NORMAL /LPF (ref 0–5)
IMM GRANULOCYTES # BLD AUTO: 0 K/UL (ref 0–0.04)
IMM GRANULOCYTES NFR BLD AUTO: 0 % (ref 0–0.5)
KETONES UR QL STRIP.AUTO: NEGATIVE MG/DL
LEUKOCYTE ESTERASE UR QL STRIP.AUTO: NEGATIVE
LYMPHOCYTES # BLD: 1.8 K/UL (ref 0.8–3.5)
LYMPHOCYTES NFR BLD: 33 % (ref 12–49)
M PNEUMO DNA SPEC QL NAA+PROBE: NOT DETECTED
MAGNESIUM SERPL-MCNC: 1.9 MG/DL (ref 1.6–2.4)
MCH RBC QN AUTO: 37.1 PG (ref 26–34)
MCHC RBC AUTO-ENTMCNC: 34.1 G/DL (ref 30–36.5)
MCV RBC AUTO: 108.8 FL (ref 80–99)
MONOCYTES # BLD: 0.4 K/UL (ref 0–1)
MONOCYTES NFR BLD: 7 % (ref 5–13)
NEUTS SEG # BLD: 3 K/UL (ref 1.8–8)
NEUTS SEG NFR BLD: 58 % (ref 32–75)
NITRITE UR QL STRIP.AUTO: NEGATIVE
NRBC # BLD: 0 K/UL (ref 0–0.01)
NRBC BLD-RTO: 0 PER 100 WBC
PH UR STRIP: 7.5 [PH] (ref 5–8)
PLATELET # BLD AUTO: 242 K/UL (ref 150–400)
PMV BLD AUTO: 11.9 FL (ref 8.9–12.9)
POTASSIUM SERPL-SCNC: 4.1 MMOL/L (ref 3.5–5.1)
PROCALCITONIN SERPL-MCNC: <0.05 NG/ML
PROT SERPL-MCNC: 6.7 G/DL (ref 6.4–8.2)
PROT UR STRIP-MCNC: NEGATIVE MG/DL
Q-T INTERVAL, ECG07: 460 MS
QRS DURATION, ECG06: 140 MS
QTC CALCULATION (BEZET), ECG08: 547 MS
RBC # BLD AUTO: 2.51 M/UL (ref 3.8–5.2)
RBC #/AREA URNS HPF: NORMAL /HPF (ref 0–5)
RBC MORPH BLD: ABNORMAL
RBC MORPH BLD: ABNORMAL
RSV RNA SPEC QL NAA+PROBE: NOT DETECTED
RV+EV RNA SPEC QL NAA+PROBE: NOT DETECTED
SAMPLES BEING HELD,HOLD: NORMAL
SAMPLES BEING HELD,HOLD: NORMAL
SERVICE CMNT-IMP: ABNORMAL
SERVICE CMNT-IMP: ABNORMAL
SODIUM SERPL-SCNC: 136 MMOL/L (ref 136–145)
SP GR UR REFRACTOMETRY: 1.01 (ref 1–1.03)
T4 FREE SERPL-MCNC: 1.3 NG/DL (ref 0.8–1.5)
TROPONIN I SERPL-MCNC: <0.05 NG/ML
TSH SERPL DL<=0.05 MIU/L-ACNC: 0.79 UIU/ML (ref 0.36–3.74)
UROBILINOGEN UR QL STRIP.AUTO: 0.2 EU/DL (ref 0.2–1)
VENTRICULAR RATE, ECG03: 85 BPM
VIT B12 SERPL-MCNC: 353 PG/ML (ref 193–986)
WBC # BLD AUTO: 5.4 K/UL (ref 3.6–11)
WBC URNS QL MICRO: NORMAL /HPF (ref 0–4)

## 2019-12-26 PROCEDURE — 74011250637 HC RX REV CODE- 250/637: Performed by: INTERNAL MEDICINE

## 2019-12-26 PROCEDURE — 96372 THER/PROPH/DIAG INJ SC/IM: CPT

## 2019-12-26 PROCEDURE — 99285 EMERGENCY DEPT VISIT HI MDM: CPT

## 2019-12-26 PROCEDURE — 99218 HC RM OBSERVATION: CPT

## 2019-12-26 PROCEDURE — 77030038269 HC DRN EXT URIN PURWCK BARD -A

## 2019-12-26 PROCEDURE — 93005 ELECTROCARDIOGRAM TRACING: CPT

## 2019-12-26 PROCEDURE — 82962 GLUCOSE BLOOD TEST: CPT

## 2019-12-26 PROCEDURE — 94761 N-INVAS EAR/PLS OXIMETRY MLT: CPT

## 2019-12-26 PROCEDURE — 84484 ASSAY OF TROPONIN QUANT: CPT

## 2019-12-26 PROCEDURE — 74011636637 HC RX REV CODE- 636/637: Performed by: INTERNAL MEDICINE

## 2019-12-26 PROCEDURE — 82550 ASSAY OF CK (CPK): CPT

## 2019-12-26 PROCEDURE — 74011250636 HC RX REV CODE- 250/636: Performed by: INTERNAL MEDICINE

## 2019-12-26 PROCEDURE — 0099U RESPIRATORY PANEL,PCR,NASOPHARYNGEAL: CPT

## 2019-12-26 PROCEDURE — 77030019905 HC CATH URETH INTMIT MDII -A

## 2019-12-26 PROCEDURE — 83735 ASSAY OF MAGNESIUM: CPT

## 2019-12-26 PROCEDURE — 87086 URINE CULTURE/COLONY COUNT: CPT

## 2019-12-26 PROCEDURE — 82607 VITAMIN B-12: CPT

## 2019-12-26 PROCEDURE — 80053 COMPREHEN METABOLIC PANEL: CPT

## 2019-12-26 PROCEDURE — 96374 THER/PROPH/DIAG INJ IV PUSH: CPT

## 2019-12-26 PROCEDURE — 83880 ASSAY OF NATRIURETIC PEPTIDE: CPT

## 2019-12-26 PROCEDURE — 81001 URINALYSIS AUTO W/SCOPE: CPT

## 2019-12-26 PROCEDURE — 36415 COLL VENOUS BLD VENIPUNCTURE: CPT

## 2019-12-26 PROCEDURE — 84425 ASSAY OF VITAMIN B-1: CPT

## 2019-12-26 PROCEDURE — 85025 COMPLETE CBC W/AUTO DIFF WBC: CPT

## 2019-12-26 PROCEDURE — 84439 ASSAY OF FREE THYROXINE: CPT

## 2019-12-26 PROCEDURE — 84145 PROCALCITONIN (PCT): CPT

## 2019-12-26 PROCEDURE — 95816 EEG AWAKE AND DROWSY: CPT | Performed by: PSYCHIATRY & NEUROLOGY

## 2019-12-26 PROCEDURE — 74011250636 HC RX REV CODE- 250/636: Performed by: PSYCHIATRY & NEUROLOGY

## 2019-12-26 PROCEDURE — 70450 CT HEAD/BRAIN W/O DYE: CPT

## 2019-12-26 PROCEDURE — 71045 X-RAY EXAM CHEST 1 VIEW: CPT

## 2019-12-26 PROCEDURE — 74011000258 HC RX REV CODE- 258: Performed by: PSYCHIATRY & NEUROLOGY

## 2019-12-26 PROCEDURE — 84443 ASSAY THYROID STIM HORMONE: CPT

## 2019-12-26 PROCEDURE — 96361 HYDRATE IV INFUSION ADD-ON: CPT

## 2019-12-26 RX ORDER — SODIUM CHLORIDE 0.9 % (FLUSH) 0.9 %
5-40 SYRINGE (ML) INJECTION EVERY 8 HOURS
Status: DISCONTINUED | OUTPATIENT
Start: 2019-12-26 | End: 2019-12-27 | Stop reason: HOSPADM

## 2019-12-26 RX ORDER — THIAMINE HYDROCHLORIDE 100 MG/ML
100 INJECTION, SOLUTION INTRAMUSCULAR; INTRAVENOUS DAILY
Status: DISCONTINUED | OUTPATIENT
Start: 2019-12-27 | End: 2019-12-26

## 2019-12-26 RX ORDER — MAGNESIUM SULFATE 100 %
4 CRYSTALS MISCELLANEOUS AS NEEDED
Status: DISCONTINUED | OUTPATIENT
Start: 2019-12-26 | End: 2019-12-27 | Stop reason: HOSPADM

## 2019-12-26 RX ORDER — SODIUM CHLORIDE 0.9 % (FLUSH) 0.9 %
5-40 SYRINGE (ML) INJECTION AS NEEDED
Status: DISCONTINUED | OUTPATIENT
Start: 2019-12-26 | End: 2019-12-27 | Stop reason: HOSPADM

## 2019-12-26 RX ORDER — ACETAMINOPHEN 325 MG/1
650 TABLET ORAL
Status: DISCONTINUED | OUTPATIENT
Start: 2019-12-26 | End: 2019-12-27 | Stop reason: HOSPADM

## 2019-12-26 RX ORDER — SERTRALINE HYDROCHLORIDE 50 MG/1
100 TABLET, FILM COATED ORAL DAILY
Status: DISCONTINUED | OUTPATIENT
Start: 2019-12-27 | End: 2019-12-27 | Stop reason: HOSPADM

## 2019-12-26 RX ORDER — ONDANSETRON 2 MG/ML
4 INJECTION INTRAMUSCULAR; INTRAVENOUS
Status: DISCONTINUED | OUTPATIENT
Start: 2019-12-26 | End: 2019-12-27 | Stop reason: HOSPADM

## 2019-12-26 RX ORDER — ZOLPIDEM TARTRATE 5 MG/1
5 TABLET ORAL
Status: DISCONTINUED | OUTPATIENT
Start: 2019-12-26 | End: 2019-12-27 | Stop reason: HOSPADM

## 2019-12-26 RX ORDER — DEXTROSE MONOHYDRATE 100 MG/ML
0-250 INJECTION, SOLUTION INTRAVENOUS AS NEEDED
Status: DISCONTINUED | OUTPATIENT
Start: 2019-12-26 | End: 2019-12-27 | Stop reason: HOSPADM

## 2019-12-26 RX ORDER — DIPHENHYDRAMINE HCL 25 MG
25 CAPSULE ORAL
Status: DISCONTINUED | OUTPATIENT
Start: 2019-12-26 | End: 2019-12-27 | Stop reason: HOSPADM

## 2019-12-26 RX ORDER — INSULIN LISPRO 100 [IU]/ML
INJECTION, SOLUTION INTRAVENOUS; SUBCUTANEOUS
Status: DISCONTINUED | OUTPATIENT
Start: 2019-12-26 | End: 2019-12-27 | Stop reason: HOSPADM

## 2019-12-26 RX ORDER — INSULIN GLARGINE 100 [IU]/ML
22 INJECTION, SOLUTION SUBCUTANEOUS EVERY EVENING
Status: DISCONTINUED | OUTPATIENT
Start: 2019-12-26 | End: 2019-12-27 | Stop reason: HOSPADM

## 2019-12-26 RX ORDER — CARVEDILOL 3.12 MG/1
6.25 TABLET ORAL 2 TIMES DAILY WITH MEALS
Status: DISCONTINUED | OUTPATIENT
Start: 2019-12-26 | End: 2019-12-27 | Stop reason: HOSPADM

## 2019-12-26 RX ORDER — HEPARIN SODIUM 5000 [USP'U]/ML
5000 INJECTION, SOLUTION INTRAVENOUS; SUBCUTANEOUS EVERY 8 HOURS
Status: DISCONTINUED | OUTPATIENT
Start: 2019-12-26 | End: 2019-12-27 | Stop reason: HOSPADM

## 2019-12-26 RX ADMIN — INSULIN GLARGINE 22 UNITS: 100 INJECTION, SOLUTION SUBCUTANEOUS at 20:42

## 2019-12-26 RX ADMIN — THIAMINE HYDROCHLORIDE 100 MG: 100 INJECTION, SOLUTION INTRAMUSCULAR; INTRAVENOUS at 20:39

## 2019-12-26 RX ADMIN — SODIUM CHLORIDE 1000 ML: 900 INJECTION, SOLUTION INTRAVENOUS at 14:28

## 2019-12-26 RX ADMIN — ZOLPIDEM TARTRATE 5 MG: 5 TABLET ORAL at 23:36

## 2019-12-26 RX ADMIN — HEPARIN SODIUM 5000 UNITS: 5000 INJECTION INTRAVENOUS; SUBCUTANEOUS at 22:24

## 2019-12-26 RX ADMIN — INSULIN LISPRO 2 UNITS: 100 INJECTION, SOLUTION INTRAVENOUS; SUBCUTANEOUS at 22:25

## 2019-12-26 RX ADMIN — CARVEDILOL 6.25 MG: 3.12 TABLET, FILM COATED ORAL at 20:39

## 2019-12-26 RX ADMIN — Medication 10 ML: at 22:24

## 2019-12-26 NOTE — CONSULTS
Jelena Rosales MD    [address] [facility], [address] [facility]    Office [phone],[facility] [phone]  Pager [phone]    Date of  Admission: 12/26/2019 11:29 AM  PCP- Jenn Nelson MD    Latrice Mar is a 80 y.o. female admitted for Dyspnea [R06.00]. Consult requested by Tomi Farrell MD    Assessment/Plan    Dyspnea  Altered mental status/history of dementia  Acute on chronic renal insufficiency  Nonischemic cardiomyopathy/chronic HFrEF/status post  BiVi AICD  - EF 45%  DM  AMIRA - cannot paul CPAP    Plan:   Patient on Bumex 1 mg daily/Coreg-PTA  Concern for worsening of renal function/dehydration on diuretics. Agree with holding diuretics for now. Objectively no signs of CHF exacerbation  Continue beta-blockers. 12/19/19   ECHO ADULT COMPLETE 12/20/2019 12/20/2019    Narrative · Normal cavity size, wall thickness and systolic function (ejection   fraction normal). Estimated left ventricular ejection fraction is 45 -   50%. No regional wall motion abnormality noted. Mild (grade 1) left   ventricular diastolic dysfunction. · Mitral valve thickening. Moderate mitral annular calcification. Mild   mitral valve regurgitation is present. Signed by: Stacia Lemons MD         I appreciate the opportunity to be involved in Ms. Davis. See below note for details. Please do not hesitate to contact us with questions or concerns. Jelena Rosales MD    Cardiac Testing/ Procedures: A. Cardiac Cath/PCI:    B.ECHO/JACQUELINE:    C.StressNuclear/Stress ECHO/Stress test:    D.Vascular:    E. EP:    F. Miscellaneous:    Care Plan discussed with:     Subjective:    Patient is a 80-year-old CF with history of nonischemic cardiomyopathy/status post by V AICD/chronic systolic heart failure who is admitted for altered mental status/? Dyspnea. Patient is a poor historian. No chest pain. No swelling lower extremities. Troponin less than 0.05.   NT proBNP 538, creatinine 1.46, hemoglobin 9.3. Chest x-ray-Evidence of pulmonary hyperaeration. Slight prominence of the  basilar markings. No evidence of lobar consolidation.     Past Medical History:   Diagnosis Date    Arthritis     [facility] cancer ([facility]     Cerebral microvascular disease     Dementia ([facility]     Diabetes ([facility]     Dyslipidemia     GERD (gastroesophageal reflux disease)     Hearing loss     Hypertension     Hypothyroid     LBBB (left bundle branch block)     Melanoma (HCC)     Non-ischemic cardiomyopathy ([facility]     Cath 2/16/15 - Mild (non-obstructive) CAD,LVEF 30%, 2+ MR    Skin cancer     melanoma on legs    Sleep apnea     wears O2 at night- cannot tolerate CPAP    Systolic CHF ([facility]     Admission 2/13/16      Past Surgical History:   Procedure Laterality Date    HX BREAST LUMPECTOMY  2003    right    HX CATARACT REMOVAL  1994    bilateral    HX CHOLECYSTECTOMY      HX DILATION AND CURETTAGE  1960    HX HEART CATHETERIZATION      x2- no blockages    HX HEENT  2004    surgery on upper jaw    HX HYSTERECTOMY  1967    HX HYSTEROSCOPY      HX OOPHORECTOMY  1967    left    HX ORTHOPAEDIC  2010    right knee replacement    HX ORTHOPAEDIC      repair torn meniscus left knee    HX ORTHOPAEDIC  1996    repair knuckle on left pinky finger    HX OTHER SURGICAL  2000    remove benign tumore right side between rib and right lung    HX PACEMAKER      HX PARTIAL THYROIDECTOMY      HX UROLOGICAL  2003    bladder surgery x2     Allergies   Allergen Reactions    Beta-Blockers (Beta-Adrenergic Blocking Agts) Unknown (comments)     Made her feel tired, breakout on her back    Other Medication Unknown (comments)     Bromides    Phenobarbital Unknown (comments)    Sulfa (Sulfonamide Antibiotics) Swelling     rash     Family History   Problem Relation Age of Onset    Stroke Mother       Social History     Tobacco Use    Smoking status: Never Smoker    Smokeless tobacco: Never Used   Substance Use Topics    Alcohol use: Yes     Alcohol/week: 4.0 standard drinks     Types: 3 Glasses of wine, 1 Shots of liquor per week    Drug use: No          Medications:  Medications Prior to Admission   Medication Sig    cephALEXin (KEFLEX) 250 mg capsule Take 1 Cap by mouth two (2) times a day for 10 days.  levothyroxine (SYNTHROID) 50 mcg tablet Take 50 mcg by mouth Daily (before breakfast).  insulin glargine (LANTUS U-100 INSULIN) 100 unit/mL injection 22 Units by SubCUTAneous route Daily (before lunch).  SITagliptin (JANUVIA) 50 mg tablet Take 50 mg by mouth daily.  bumetanide (BUMEX) 1 mg tablet Take 1 Tab by mouth daily.  calcium carbonate (TUMS) 200 mg calcium (500 mg) chew Take 1 Tab by mouth three (3) times daily as needed (GERD).  carvedilol (COREG) 6.25 mg tablet TAKE 1 TABLET TWICE A DAY WITH MEALS    co-enzyme Q-10 (CO Q-10) 50 mg capsule Take 50 mg by mouth daily (after lunch).  sertraline (ZOLOFT) 100 mg tablet Take 100 mg by mouth daily.  ergocalciferol (VITAMIN D2) 50,000 unit capsule Take 50,000 Units by mouth every Sunday.  melatonin 10 mg cap Take 10 mg by mouth nightly.  DOCOSAHEXANOIC ACID/EPA (FISH OIL PO) Take 1 Tab by mouth daily (after lunch).  cefUROXime (CEFTIN) 500 mg tablet Take 1 Tab by mouth two (2) times a day for 7 days.      Current Facility-Administered Medications   Medication Dose Route Frequency    sodium chloride 0.9 % bolus infusion 1,000 mL  1,000 mL IntraVENous ONCE    sodium chloride (NS) flush 5-40 mL  5-40 mL IntraVENous Q8H    sodium chloride (NS) flush 5-40 mL  5-40 mL IntraVENous PRN    zolpidem (AMBIEN) tablet 5 mg  5 mg Oral QHS PRN    glucose chewable tablet 16 g  4 Tab Oral PRN    glucagon (GLUCAGEN) injection 1 mg  1 mg IntraMUSCular PRN    dextrose 10% infusion 0-250 mL  0-250 mL IntraVENous PRN    acetaminophen (TYLENOL) tablet 650 mg  650 mg Oral Q4H PRN    diphenhydrAMINE (BENADRYL) capsule 25 mg  25 mg Oral Q4H PRN    ondansetron [facility]) injection 4 mg  4 mg IntraVENous Q4H PRN    insulin lispro (HUMALOG) injection   SubCUTAneous AC&HS    thiamine (B-1) 100 mg in 0.9% sodium chloride 50 mL IVPB  100 mg IntraVENous Q24H         Review of Systems:  (bold if positive, if negative)    As in HPI - rest of ROS noncontributory        Physical Exam:  Visit Vitals  /66 (BP 1 Location: Left arm, BP Patient Position: At rest)   Pulse 82   Temp 97.9 °F (36.6 °C)   Resp 15   Ht 5' 5\" (1.651 m)   Wt 174 lb 9.7 oz (79.2 kg)   SpO2 96%   BMI 29.06 kg/m²         Telemetry:     Gen: Well-developed, well-nourished, in no acute distress, elderly  HEENT:  Pale conjunctivae,  Neck: Supple,No JVD,  Resp: No accessory muscle use, Clear breath sounds, No rales or rhonchi  Card: Irregular Rate,Rythm,Normal S1, S2, 2/6 sys  Murmur+,No rubs or gallop.    Abd:  Soft, BS+  MSK: No cyanosis   Skin: No rashes   Neuro:  moving all four extremities,  Psych:  Confused  LE: No edema  Vascular:Radial  Pulses 2+ and symmetric        EKG: Vpaced rhythm/PVC      Cxray:    LABS:        Lab Results   Component Value Date/Time    WBC 5.4 12/26/2019 12:03 PM    HGB 9.3 (L) 12/26/2019 12:03 PM    HCT 27.3 (L) 12/26/2019 12:03 PM    PLATELET 812 21/28/9330 12:03 PM    .8 (H) 12/26/2019 12:03 PM     Lab Results   Component Value Date/Time    Sodium 136 12/26/2019 12:03 PM    Potassium 4.1 12/26/2019 12:03 PM    Chloride 103 12/26/2019 12:03 PM    CO2 23 12/26/2019 12:03 PM    Anion gap 10 12/26/2019 12:03 PM    Glucose 162 (H) 12/26/2019 12:03 PM    BUN 38 (H) 12/26/2019 12:03 PM    Creatinine 1.46 (H) 12/26/2019 12:03 PM    BUN/Creatinine ratio 26 (H) 12/26/2019 12:03 PM    GFR est AA 41 (L) 12/26/2019 12:03 PM    GFR est non-AA 34 (L) 12/26/2019 12:03 PM    Calcium 8.4 (L) 12/26/2019 12:03 PM     Lab Results   Component Value Date/Time     02/14/2016 05:35 AM    CK-MB Index 2.4 02/14/2016 05:35 AM    Troponin-I, Qt. <0.05 12/26/2019 12:03 PM     Lab Results Component Value Date/Time    aPTT 28.5 10/15/2010 11:00 AM     Lab Results   Component Value Date/Time    INR 1.0 08/08/2016 03:56 PM    INR 1.0 01/15/2014 12:50 PM    INR 1.9 (H) 10/24/2010 04:30 AM    Prothrombin time 10.0 08/08/2016 03:56 PM    Prothrombin time 10.2 01/15/2014 12:50 PM    Prothrombin time 19.4 (H) 10/24/2010 04:30 AM     No results found for: BNP, BNPP, XBNPT      Ashvin Tong MD

## 2019-12-26 NOTE — PROGRESS NOTES
[facility]: MED RECONCILIATION    Comments/Recommendations:   Patient/patient's caregiver/nurse reports compliance with current medications, however she has not had her meds yet this morning, including Lantus which she takes at lunch time  Antibiotics - Patient was prescribed cephalexin 250 mg PO BID x 10 days on 12/19/19. She last took a dose on 12/25/19 in the evening. This was changed to cefuroxime 500 mg PO BID x 7 days on 12/23/19, but patient has not started taking this yet. I left both medications on her PTA list with notes indicating the above for completeness. Medications added:     none    Medications removed: Losartan 25 mg PO every other day - patient's nurse/caregiver reports that she no longer takes this  MVI PO daily  Miralax PO daily    Medications adjusted:    Vitamin D2 50,000 units weekly on Sundays  Lantus insulin 22 units before lunch (instead of in the evening)    Information obtained from: patient, patient's caregiver/nurse, Rx query    Significant PMH/Disease States:   Past Medical History:   Diagnosis Date    Arthritis     Breast cancer ([facility]     Cerebral microvascular disease     Dementia ([facility]     Diabetes ([facility]     Dyslipidemia     GERD (gastroesophageal reflux disease)     Hearing loss     Hypertension     Hypothyroid     LBBB (left bundle branch block)     Melanoma ([facility]     Non-ischemic cardiomyopathy ([facility] [facility]     Cath 2/16/15 - Mild (non-obstructive) CAD,LVEF 30%, 2+ MR    Skin cancer     melanoma on legs    Sleep apnea     wears O2 at night- cannot tolerate CPAP    Systolic CHF [facility])     Admission 2/13/16     Chief Complaint for this Admission:   Chief Complaint   Patient presents with    Shortness of Breath     Allergies: Beta-blockers (beta-adrenergic blocking agts); Other medication;  Phenobarbital; and Sulfa (sulfonamide antibiotics)    Prior to Admission Medications:     Medication Documentation Review Audit       Reviewed by Dilma Li (Pharmacist) on 12/26/19 at 1432      Medication Sig Documenting Provider Last Dose Status Taking? bumetanide (BUMEX) 1 mg tablet Take 1 Tab by mouth daily. Jody Wells MD 12/25/2019 am Active Yes   calcium carbonate (TUMS) 200 mg calcium (500 mg) chew Take 1 Tab by mouth three (3) times daily as needed (GERD). Provider, Historical  Active Yes   carvedilol (COREG) 6.25 mg tablet TAKE 1 TABLET TWICE A DAY WITH MEALS Damon Carrasquillo MD 12/25/2019 pm Active Yes   cefUROXime (CEFTIN) 500 mg tablet Take 1 Tab by mouth two (2) times a day for 7 days. Hillary Camilo PA-C  Active            Med Note (Rolin Hoffmann   Thu Dec 26, 2019  2:29 PM) Newly prescribed on 12/23/19 to replace cephalexin, but has not yet started taking   cephALEXin (KEFLEX) 250 mg capsule Take 1 Cap by mouth two (2) times a day for 10 days. Zonia Floyd MD 12/25/2019 pm Active Yes           Med Note (Rolin Hoffmann   Thu Dec 26, 2019  2:32 PM) Replaced by cefuroxime but has still been taking cephalexin up through 12/25/19   co-enzyme Q-10 (CO Q-10) 50 mg capsule Take 50 mg by mouth daily (after lunch). Provider, Historical 12/25/2019 lunch Active Yes   DOCOSAHEXANOIC ACID/EPA (FISH OIL PO) Take 1 Tab by mouth daily (after lunch). Christian Dang MD 12/25/2019 lunch Active Yes           Med Note (Christine Adames   Sat Feb 13, 2016  8:12 AM) . ergocalciferol (VITAMIN D2) 50,000 unit capsule Take 50,000 Units by mouth every Sunday. Christian Dang MD 12/22/2019 am Active Yes   insulin glargine (LANTUS U-100 INSULIN) 100 unit/mL injection 22 Units by SubCUTAneous route Daily (before lunch). Provider, Historical 12/25/2019 lunch Active Yes   levothyroxine (SYNTHROID) 50 mcg tablet Take 50 mcg by mouth Daily (before breakfast). Provider, Historical 12/25/2019 am Active Yes   melatonin 10 mg cap Take 10 mg by mouth nightly. Christian Dang MD 12/25/2019 pm Active Yes           Med Note Christine Adames   Sat Feb 13, 2016  8:13 AM) .    sertraline (ZOLOFT) 100 mg tablet Take 100 mg by mouth daily. Other, MD Christian 12/25/2019 am Active Yes           Med Note (Kristen Tejada   Thu Aug 29, 2019  1:58 PM)     SITagliptin (JANUVIA) 50 mg tablet Take 50 mg by mouth daily. Provider, Historical 12/25/2019 am Active Yes                  Thank you for the consult,  Bryan FAY, [facility]

## 2019-12-26 NOTE — PROGRESS NOTES
12/26/19 1710 12/26/19 1712 12/26/19 1714   RT Walking Oximetry   Stage Resting (Room Air) During Walk (Room Air) During Walk (Room Air)   SpO2 96 % 96 % 94 %   HR 76 bpm 90 bpm 84 bpm   Rate of Dyspnea 0 0 0

## 2019-12-26 NOTE — H&P
[facility]    Hospitalist Admission Note      NAME:  Dee Carrizales   :   1928   MRN:  [id number]     PCP:  Masha Gallo MD     Date/Time:  2019 2:15 PM          Subjective:     CHIEF COMPLAINT: dyspnea     HISTORY OF PRESENT ILLNESS:     Ms. Regina Brady is a 80 y.o.  female who presented to the Emergency Department complaining of dyspnea and confusion. Worsening over 2 days. ER workup is negative. Recent outpatient clinical Dx UTI on Abx. We will admit her for observation.       Past Medical History:   Diagnosis Date    Arthritis     Breast cancer ([facility]     Cerebral microvascular disease     Dementia ([facility] [facility]     Diabetes ([facility]     Dyslipidemia     GERD (gastroesophageal reflux disease)     Hearing loss     Hypertension     Hypothyroid     LBBB (left bundle branch block)     Melanoma (HCC)     Non-ischemic cardiomyopathy ([facility] [facility]     Cath 2/16/15 - Mild (non-obstructive) CAD,LVEF 30%, 2+ MR    Skin cancer     melanoma on legs    Sleep apnea     wears O2 at night- cannot tolerate CPAP    Systolic CHF ([facility] [facility]     Admission 16        Past Surgical History:   Procedure Laterality Date    HX BREAST LUMPECTOMY  2003    right    HX CATARACT REMOVAL      bilateral    HX CHOLECYSTECTOMY      HX DILATION AND CURETTAGE  1960    HX HEART CATHETERIZATION      x2- no blockages    HX HEENT      surgery on upper jaw    HX HYSTERECTOMY  1967    HX HYSTEROSCOPY      HX OOPHORECTOMY  1967    left    HX ORTHOPAEDIC  2010    right knee replacement    HX ORTHOPAEDIC      repair torn meniscus left knee    HX ORTHOPAEDIC      repair knuckle on left pinky finger    HX OTHER SURGICAL  2000    remove benign tumore right side between rib and right lung    HX PACEMAKER      HX PARTIAL THYROIDECTOMY      HX UROLOGICAL  2003    bladder surgery x2       Social History     Tobacco Use    Smoking status: Never Smoker    Smokeless tobacco: Never Used   Substance Use Topics  Alcohol use: Yes     Alcohol/week: 4.0 standard drinks     Types: 3 Glasses of wine, 1 Shots of liquor per week        Family History   Problem Relation Age of Onset    Stroke Mother       Family hx cannot be fully assessed, due to her confusoin    Allergies   Allergen Reactions    Beta-Blockers (Beta-Adrenergic Blocking Agts) Unknown (comments)     Made her feel tired, breakout on her back    Other Medication Unknown (comments)     Bromides    Phenobarbital Unknown (comments)    Sulfa (Sulfonamide Antibiotics) Swelling     rash        Prior to Admission medications    Medication Sig Start Date End Date Taking? Authorizing Provider   cefUROXime (CEFTIN) 500 mg tablet Take 1 Tab by mouth two (2) times a day for 7 days. 12/23/19 12/30/19  Carlyn Tomlin PA-C   cephALEXin (KEFLEX) 250 mg capsule Take 1 Cap by mouth two (2) times a day for 10 days. 12/19/19 12/29/19  Sara Castillo MD   polyethylene glycol (MIRALAX) 17 gram packet Take 17 g by mouth daily. Provider, Historical   levothyroxine (SYNTHROID) 50 mcg tablet Take  by mouth Daily (before breakfast). Provider, Historical   insulin glargine (LANTUS U-100 INSULIN) 100 unit/mL injection 22 Units by SubCUTAneous route every evening. Provider, Historical   SITagliptin (JANUVIA) 50 mg tablet Take 50 mg by mouth daily. Provider, Historical   bumetanide (BUMEX) 1 mg tablet Take 1 Tab by mouth daily. 8/3/19   Radha Huber MD   losartan (COZAAR) 25 mg tablet Take 25 mg by mouth every other day. Provider, Historical   calcium carbonate (TUMS) 200 mg calcium (500 mg) chew Take 1 Tab by mouth three (3) times daily as needed (GERD). Provider, Historical   carvedilol (COREG) 6.25 mg tablet TAKE 1 TABLET TWICE A DAY WITH MEALS 4/26/19   Davina Cook MD   co-enzyme Q-10 (CO Q-10) 50 mg capsule Take 50 mg by mouth daily (after lunch). Provider, Historical   sertraline (ZOLOFT) 100 mg tablet Take 100 mg by mouth daily.     Other, Phys, MD   ergocalciferol (VITAMIN D2) 50,000 unit capsule Take 50,000 Units by mouth every seven (7) days. Christian Dang MD   melatonin 10 mg cap Take 10 mg by mouth nightly. Christian Dang MD   multivitamin (ONE A DAY) tablet Take 1 Tab by mouth daily (after lunch). Christian Dang MD   DOCOSAHEXANOIC ACID/EPA (FISH OIL PO) Take 1 Tab by mouth daily (after lunch).     Christian Dang MD       Review of Systems:  (bold if positive, if negative)    Gen:  Eyes:  ENT:  CVS:  Pulm:  dyspneaGI:  :  MS:  Skin:  Psych:  Endo:  Hem:  Renal:  Neuro:        Objective:      VITALS:    Vital signs reviewed; most recent are:    Visit Vitals  /85   Pulse 84   Temp 97.8 °F (36.6 °C)   Resp 20   Ht 5' 5\" (1.651 m)   Wt 77.6 kg (171 lb)   SpO2 97%   BMI 28.46 kg/m²     SpO2 Readings from Last 6 Encounters:   12/26/19 97%   12/19/19 100%   08/03/19 96%   06/06/19 97%   05/23/19 95%   01/16/19 96%    O2 Flow Rate (L/min): 2 l/min       Intake/Output Summary (Last 24 hours) at 12/26/2019 1416  Last data filed at 12/26/2019 1347  Gross per 24 hour   Intake    Output 75 ml   Net -75 ml        Exam:     Physical Exam:    Gen:  Well-developed, well-nourished, in no acute distress  HEENT:  Pink conjunctivae, PERRL, hearing intact to voice, moist mucous membranes  Neck:  Supple, without masses, thyroid non-tender  Resp:  No accessory muscle use, clear breath sounds without wheezes rales or rhonchi  Card:  No murmurs, normal S1, S2 without thrills, bruits or peripheral edema  Abd:  Soft, non-tender, non-distended, normoactive bowel sounds are present, no mass  Lymph:  No cervical or inguinal adenopathy  Musc:  No cyanosis or clubbing  Skin:  No rashes or ulcers, skin turgor is good  Neuro:  Cranial nerves are grossly intact, general motor weakness, follows commands vaguely  Psych:  Poor insight, oriented to person     Labs:    Recent Labs     12/26/19  1203   WBC 5.4   HGB 9.3*   HCT 27.3*        Recent Labs     12/26/19  1203   NA 136   K 4.1      CO2 23   *   BUN 38*   CREA 1.46*   CA 8.4*   MG 1.9   ALB 3.4*   TBILI 0.5   SGOT 16   ALT 18     Lab Results   Component Value Date/Time    Glucose (POC) 234 (H) 08/03/2019 11:07 AM    Glucose (POC) 165 (H) 08/03/2019 06:50 AM     No results for input(s): PH, PCO2, PO2, HCO3, FIO2 in the last 72 hours. No results for input(s): INR, INREXT, INREXT in the last 72 hours. All Micro Results     Procedure Component Value Units Date/Time    CULTURE, URINE [[id number]] Collected:  12/26/19 1346    Order Status:  Completed Specimen:  Cath Urine Updated:  12/26/19 1414    RESPIRATORY PANEL,PCR,NASOPHARYNGEAL [[id number]]     Order Status:  Sent Specimen:  NASOPHARYNGEAL SWAB     CULTURE, URINE [[id number]]     Order Status:  Canceled Specimen:  Cath Urine           I have reviewed previous records       Assessment and Plan:      Dyspnea - POA, subjective. Normal labs, oxygen and xray. Suspect dementia, deconditioning and anxiety. Check 6 minute walk. JENN / Dehydration - POA, mild due to IVVD from diauretics. Give IVF. Can resume ARB and metformin tomorrow.     Acute metabolic encephalopathy / Cerebral microvascular disease / Dementia / Anxiety and depression / Hearing loss - Worse than baseline due to JENN. CT with severe microvascular disease. Monitor. Palliative consult. Neuro consult. Neuropsych outpatient consult. Continue Zoloft    HFrEF (heart failure with reduced ejection fraction) / Hypertension / LBBB (left bundle branch block) / Non-ischemic cardiomyopathy / Biventricular automatic implantable cardioverter defibrillator in situ - POA, stable, recent EF 45%. At home resume coreg, losartan, atorvastatin and bumex     DM type 2 (diabetes mellitus, type 2) with nephropathy - Diabetic diet and counseling. SSI per protocol. Continue home metformin.  A1c was 8.8.     Diarrhea - Chronic, on FloraQn     Sleep apnea - continue O2 at night, does not tolerate CPAP due to anxiety     Anemia - Stable. Chronic disease. Taking MVI. Heme follow up.     Hypothyroid - TSH WNL. Continue levothyroxine at homeopathic dose. I doubt she has hypothyroidism.      Breast CA - outpatient follow up    Weakness / Arthritis - Fall precautions. Pt/OT eval    Dyslipidemia - Unclear she is getting any benefit from statin, lacking a dx of CVA or CAD    Telemetry reviewed:   normal sinus rhythm    Risk of deterioration: high      Total time spent with patient: 79 Minutes I reviewed chart, notes, data and current medications in the medical record. I have examined and treated the patient at bedside during this period.                  Care Plan discussed with: Patient, Nursing Staff and >50% of time spent in counseling and coordination of care    Discussed:  Care Plan       ___________________________________________________    Attending Physician: Pipe Gardner MD

## 2019-12-26 NOTE — ED NOTES
Care companion at bedside. Reports increased confusion over the last few days. During triage pt continues to ask same questions after reorientation.

## 2019-12-26 NOTE — ED NOTES
TRANSFER - OUT REPORT:    Verbal report given to 5th floor RN(name) on [employer]  being transferred to [facility](unit) for routine progression of care       Report consisted of patients Situation, Background, Assessment and   Recommendations(SBAR). Information from the following report(s) SBAR, Kardex, ED Summary and MAR was reviewed with the receiving nurse. Lines:   Peripheral IV 12/26/19 Right Forearm (Active)   Site Assessment Clean, dry, & intact 12/26/2019 12:00 PM   Phlebitis Assessment 0 12/26/2019 12:00 PM   Infiltration Assessment 0 12/26/2019 12:00 PM   Dressing Status Clean, dry, & intact 12/26/2019 12:00 PM   Dressing Type Transparent 12/26/2019 12:00 PM   Hub Color/Line Status Pink 12/26/2019 12:00 PM        Opportunity for questions and clarification was provided.       Patient transported with:   [employer]

## 2019-12-26 NOTE — ED PROVIDER NOTES
80 y.o. female with past medical history significant for HTN, DM, breast cancer, GERD, pacemaker, dyslipidemia, CHF, cardiomyopathy, and melanoma who presents from [facility] via EMS with chief complaint of shortness of breath. Pt complains of shortness of breath that started this morning. Caregiver reports the pt did not \"feel well\" yesterday and has been more confused than baseline. Caregiver states the pt was seen in ED last week for similar symptoms and found to have an UTI. Pt was prescribed ceftin and keflex. Pt denies fall, fever, or cough. Pt denies anticoagulation. There are no other acute medical concerns at this time. Social hx: Never Smoker. EtOH Use. [facility] at [facility]. PCP: Stacy Morales MD  Cardiology: Huyen Wells MD     Note written by Joy Siddiqui. Trudy Soni, as dictated by Floyd Mast MD 11:47 AM      The history is provided by the patient, the EMS personnel and a caregiver.         Past Medical History:   Diagnosis Date    Arthritis     Breast CA ([address]     Breast cancer ([address]     Diabetes ([address]     Diarrhea     chronic     Dyslipidemia     GERD (gastroesophageal reflux disease)     Hearing loss     Hypertension     LBBB (left bundle branch block)     chronic LBBB    Melanoma ([address]     Non-ischemic cardiomyopathy ([address]     Cath 2/16/15 - Mild (non-obstructive) CAD,LVEF 30%, 2+ MR    Seizures (HCC)     X1 post partum after first child was born- no more seizures    Skin cancer     melanoma on legs    Sleep apnea     wears O2 at night- cannot tolerate CPAP    Snoring     SOB (shortness of breath)     Systolic CHF ([address]     Admission 2/13/16    Thyroid disease        Past Surgical History:   Procedure Laterality Date    HX BREAST LUMPECTOMY  2003    right    HX CATARACT REMOVAL  1994    bilateral    HX CHOLECYSTECTOMY      HX DILATION AND CURETTAGE  1960    HX HEART CATHETERIZATION      x2- no blockages    HX HEENT  2004    surgery on upper jaw    HX HYSTERECTOMY  1967    HX HYSTEROSCOPY      HX OOPHORECTOMY  1967    left    HX ORTHOPAEDIC  2010    right knee replacement    HX ORTHOPAEDIC      repair torn meniscus left knee    HX ORTHOPAEDIC  1996    repair knuckle on left pinky finger    HX OTHER SURGICAL  2000    remove benign tumore right side between rib and right lung    HX PACEMAKER      HX PARTIAL THYROIDECTOMY      HX UROLOGICAL  2003    bladder surgery x2         Family History:   Problem Relation Age of Onset    Stroke Mother        Social History     Socioeconomic History    Marital status:      Spouse name: Not on file    Number of children: Not on file    Years of education: Not on file    Highest education level: Not on file   Occupational History    Not on file   Social Needs    Financial resource strain: Not on file    Food insecurity:     Worry: Not on file     Inability: Not on file    Transportation needs:     Medical: Not on file     Non-medical: Not on file   Tobacco Use    Smoking status: Never Smoker    Smokeless tobacco: Never Used   Substance and Sexual Activity    Alcohol use:  Yes     Alcohol/week: 4.0 standard drinks     Types: 3 Glasses of wine, 1 Shots of liquor per week    Drug use: No    Sexual activity: Never   Lifestyle    Physical activity:     Days per week: Not on file     Minutes per session: Not on file    Stress: Not on file   Relationships    Social connections:     Talks on phone: Not on file     Gets together: Not on file     Attends [employer]ism service: Not on file     Active member of club or organization: Not on file     Attends meetings of clubs or organizations: Not on file     Relationship status: Not on file    Intimate partner violence:     Fear of current or ex partner: Not on file     Emotionally abused: Not on file     Physically abused: Not on file     Forced sexual activity: Not on file   Other Topics Concern    Not on file   Social History Narrative    Not on file         ALLERGIES: Beta-blockers (beta-adrenergic blocking agts); Other medication; Phenobarbital; and Sulfa (sulfonamide antibiotics)    Review of Systems   Constitutional: Negative for chills and fever. Eyes: Negative for visual disturbance. Respiratory: Positive for shortness of breath. Negative for cough. Cardiovascular: Negative for chest pain and palpitations. Gastrointestinal: Negative for abdominal pain, nausea and vomiting. Genitourinary: Negative for dysuria and frequency. Musculoskeletal: Negative for arthralgias, myalgias and neck pain. Skin: Negative for color change and rash. Neurological: Negative for dizziness, weakness, light-headedness and headaches. Psychiatric/Behavioral: Positive for confusion. All other systems reviewed and are negative.       Vitals:    12/26/19 1135   BP: 139/79   Pulse: 82   Resp: 20   Temp: 97.8 °F (36.6 °C)   SpO2: 100%   Weight: 77.6 kg (171 lb)   Height: 5' 5\" (1.651 m)            Physical Exam   Physical Examination: General appearance - alert, elderly, chronically ill appearing, oriented to person, place, and time and normal appearing weight  Eyes - pupils equal and reactive, extraocular eye movements intact  Neck - supple, no significant adenopathy  Chest - clear to auscultation, no wheezes, rales or rhonchi, symmetric air entry  Heart - normal rate, regular rhythm, normal S1, S2, no murmurs, rubs, clicks or gallops  Abdomen - soft, nontender, nondistended, no masses or organomegaly  Back exam - full range of motion, no tenderness, palpable spasm or pain on motion  Neurological - alert, oriented, normal speech, no focal findings or movement disorder noted  Musculoskeletal - no joint tenderness, deformity or swelling  Extremities - peripheral pulses normal, no pedal edema, no clubbing or cyanosis  Skin - normal coloration and turgor, no rashes, no suspicious skin lesions noted  MDM  Number of Diagnoses or Management Options  Altered mental status, unspecified altered mental status type:   SOB (shortness of breath):      Amount and/or Complexity of Data Reviewed  Clinical lab tests: ordered and reviewed  Tests in the radiology section of CPT®: ordered and reviewed  Decide to obtain previous medical records or to obtain history from someone other than the patient: yes  Obtain history from someone other than the patient: yes (Family, caregiver)  Review and summarize past medical records: yes  Discuss the patient with other providers: yes (hospitalist)  Independent visualization of images, tracings, or specimens: yes    Patient Progress  Patient progress: stable         Procedures  ED EKG interpretation:  Rhythm: paced; and irregular. Rate (approx.): 85; Axis: normal; NSST, PVCs  EKG documented by Luann Houston MD      Hospitalist Moody Text for Admission-Juanjose  1:56 PM    ED Room Number: HI96/15  Patient Name and age:  Mily Villela 80 y.o.  female  Working Diagnosis:   1. Altered mental status, unspecified altered mental status type    2. SOB (shortness of breath)      Readmission: no  Isolation Requirements:  no  Recommended Level of Care:  telemetry  Code Status:  ?   Department:[facility] ED - [phone]

## 2019-12-27 ENCOUNTER — HOME HEALTH ADMISSION (OUTPATIENT)
Dept: HOME HEALTH SERVICES | Facility: HOME HEALTH | Age: 84
End: 2019-12-27

## 2019-12-27 VITALS
WEIGHT: 174.6 LBS | DIASTOLIC BLOOD PRESSURE: 70 MMHG | OXYGEN SATURATION: 93 % | HEART RATE: 72 BPM | HEIGHT: 65 IN | TEMPERATURE: 98.1 F | BODY MASS INDEX: 29.09 KG/M2 | SYSTOLIC BLOOD PRESSURE: 152 MMHG | RESPIRATION RATE: 16 BRPM

## 2019-12-27 LAB
ALBUMIN SERPL-MCNC: 3 G/DL (ref 3.5–5)
ALBUMIN/GLOB SERPL: 1 {RATIO} (ref 1.1–2.2)
ALP SERPL-CCNC: 46 U/L (ref 45–117)
ALT SERPL-CCNC: 15 U/L (ref 12–78)
AMMONIA PLAS-SCNC: 20 UMOL/L
ANION GAP SERPL CALC-SCNC: 6 MMOL/L (ref 5–15)
AST SERPL-CCNC: 14 U/L (ref 15–37)
BILIRUB SERPL-MCNC: 0.4 MG/DL (ref 0.2–1)
BUN SERPL-MCNC: 28 MG/DL (ref 6–20)
BUN/CREAT SERPL: 24 (ref 12–20)
CALCIUM SERPL-MCNC: 8.2 MG/DL (ref 8.5–10.1)
CHLORIDE SERPL-SCNC: 111 MMOL/L (ref 97–108)
CO2 SERPL-SCNC: 25 MMOL/L (ref 21–32)
COMMENT, HOLDF: NORMAL
CREAT SERPL-MCNC: 1.16 MG/DL (ref 0.55–1.02)
ERYTHROCYTE [DISTWIDTH] IN BLOOD BY AUTOMATED COUNT: 15.1 % (ref 11.5–14.5)
GLOBULIN SER CALC-MCNC: 2.9 G/DL (ref 2–4)
GLUCOSE BLD STRIP.AUTO-MCNC: 110 MG/DL (ref 65–100)
GLUCOSE BLD STRIP.AUTO-MCNC: 209 MG/DL (ref 65–100)
GLUCOSE SERPL-MCNC: 83 MG/DL (ref 65–100)
HCT VFR BLD AUTO: 25.4 % (ref 35–47)
HGB BLD-MCNC: 8.5 G/DL (ref 11.5–16)
MAGNESIUM SERPL-MCNC: 2 MG/DL (ref 1.6–2.4)
MCH RBC QN AUTO: 37.3 PG (ref 26–34)
MCHC RBC AUTO-ENTMCNC: 33.5 G/DL (ref 30–36.5)
MCV RBC AUTO: 111.4 FL (ref 80–99)
NRBC # BLD: 0 K/UL (ref 0–0.01)
NRBC BLD-RTO: 0 PER 100 WBC
PHOSPHATE SERPL-MCNC: 2.6 MG/DL (ref 2.6–4.7)
PLATELET # BLD AUTO: 260 K/UL (ref 150–400)
PMV BLD AUTO: 11.1 FL (ref 8.9–12.9)
POTASSIUM SERPL-SCNC: 3.7 MMOL/L (ref 3.5–5.1)
PROT SERPL-MCNC: 5.9 G/DL (ref 6.4–8.2)
RBC # BLD AUTO: 2.28 M/UL (ref 3.8–5.2)
RPR SER QL: NONREACTIVE
SAMPLES BEING HELD,HOLD: NORMAL
SERVICE CMNT-IMP: ABNORMAL
SERVICE CMNT-IMP: ABNORMAL
SODIUM SERPL-SCNC: 142 MMOL/L (ref 136–145)
WBC # BLD AUTO: 4.6 K/UL (ref 3.6–11)

## 2019-12-27 PROCEDURE — 80053 COMPREHEN METABOLIC PANEL: CPT

## 2019-12-27 PROCEDURE — 96372 THER/PROPH/DIAG INJ SC/IM: CPT

## 2019-12-27 PROCEDURE — 85027 COMPLETE CBC AUTOMATED: CPT

## 2019-12-27 PROCEDURE — 82140 ASSAY OF AMMONIA: CPT

## 2019-12-27 PROCEDURE — 94760 N-INVAS EAR/PLS OXIMETRY 1: CPT

## 2019-12-27 PROCEDURE — 74011250636 HC RX REV CODE- 250/636: Performed by: INTERNAL MEDICINE

## 2019-12-27 PROCEDURE — 83735 ASSAY OF MAGNESIUM: CPT

## 2019-12-27 PROCEDURE — 82962 GLUCOSE BLOOD TEST: CPT

## 2019-12-27 PROCEDURE — 74011250637 HC RX REV CODE- 250/637: Performed by: INTERNAL MEDICINE

## 2019-12-27 PROCEDURE — 99218 HC RM OBSERVATION: CPT

## 2019-12-27 PROCEDURE — 86592 SYPHILIS TEST NON-TREP QUAL: CPT

## 2019-12-27 PROCEDURE — 74011636637 HC RX REV CODE- 636/637: Performed by: INTERNAL MEDICINE

## 2019-12-27 PROCEDURE — 36415 COLL VENOUS BLD VENIPUNCTURE: CPT

## 2019-12-27 PROCEDURE — 84100 ASSAY OF PHOSPHORUS: CPT

## 2019-12-27 RX ORDER — BUMETANIDE 0.5 MG/1
0.5 TABLET ORAL DAILY
Qty: 30 TAB | Refills: 0 | Status: SHIPPED | OUTPATIENT
Start: 2019-12-27 | End: 2020-01-23

## 2019-12-27 RX ORDER — LOSARTAN POTASSIUM 25 MG/1
25 TABLET ORAL EVERY OTHER DAY
Qty: 15 TAB | Refills: 0 | Status: SHIPPED | OUTPATIENT
Start: 2019-12-27 | End: 2020-01-23

## 2019-12-27 RX ADMIN — HEPARIN SODIUM 5000 UNITS: 5000 INJECTION INTRAVENOUS; SUBCUTANEOUS at 06:05

## 2019-12-27 RX ADMIN — SERTRALINE 100 MG: 50 TABLET, FILM COATED ORAL at 09:03

## 2019-12-27 RX ADMIN — CARVEDILOL 6.25 MG: 3.12 TABLET, FILM COATED ORAL at 09:03

## 2019-12-27 RX ADMIN — Medication 10 ML: at 06:06

## 2019-12-27 RX ADMIN — INSULIN LISPRO 3 UNITS: 100 INJECTION, SOLUTION INTRAVENOUS; SUBCUTANEOUS at 11:15

## 2019-12-27 NOTE — DIABETES MGMT
[facility]    [facility] Consult Note    Recommendations/ Comments: Pt lives at [facility] and has help with meds and BS checking. She reports they check at different times. She fixes breakfast-breakfast s/w or bowl in microwave and eats what is left from dinner the night before for lunch. She orders dinner from the 'club house\" and they deliver to her room. She has not had exercise lately because she has \"been ailing\" and does not like the cold. Pt reports the insulin is new. Has been on for several months. Current hospital DM medication: lantus 22 units at bedtime; lispro insulin correction scale        Consult received for:  [x]             Assessment of home management                []      Medication Recommendations                []             Meter/monitoring     []             Insulin instruction     []             New diagnosis     []             Outpatient education     []             Insulin pump patient     []             Insulin infusion     []             DKA/HHS    Chart reviewed and initial evaluation complete on Ryan Jonas. Patient is a 80 y.o. female with known  Type 2 Diabetes on oral agent (monotherapy): januvia 50 mg daily, insulin injections: Lantus : 22 units at lunch at home. BG monitoring at home 1 times per day at rotating times. Patient reports BG levels at home trend: usually < 180 mg/dl but may be in the 200's in the morning.     Assessed and instructed patient on the following:   ·  interpretation of lab results, blood sugar goals, exercise, SMBG skills and nutrition    Encouraged the following:   · Continue current self care       A1c:   Lab Results   Component Value Date/Time    Hemoglobin A1c 6.0 (H) 12/19/2019 11:16 AM    Hemoglobin A1c, External 6.9 03/17/2016       Recent Glucose Results:   Lab Results   Component Value Date/Time    GLU 83 12/27/2019 12:49 AM     (H) 12/26/2019 12:03 PM    GLUCPOC 110 (H) 12/27/2019 07:01 AM    GLUCPOC 223 (H) 12/26/2019 09:27 PM    GLUCPOC 127 (H) 12/26/2019 05:08 PM        Lab Results   Component Value Date/Time    Creatinine 1.16 (H) 12/27/2019 12:49 AM     Estimated Creatinine Clearance: 32.9 mL/min (A) (based on SCr of 1.16 mg/dL (H)). Active Orders   Diet    DIET CARDIAC Regular; Consistent Carb 1800kcal        PO intake: No data found. Will continue to follow as needed. Thank you.

## 2019-12-27 NOTE — PROGRESS NOTES
Pharmacist Discharge Medication Reconciliation    Discharge Provider:  Bernardo Alexander     Discharge Medications:      My Medications        CONTINUE taking these medications        Instructions Each Dose to Equal Morning Noon Evening Bedtime   bumetanide 1 mg tablet  Commonly known as:  [address]    Your last dose was: Your next dose is: Take 1 Tab by mouth daily. 1 mg                 calcium carbonate 200 mg calcium (500 mg) Chew  Commonly known as:  TUMS    Your last dose was: Your next dose is: Take 1 Tab by mouth three (3) times daily as needed (GERD). 1 Tab                 carvedilol 6.25 mg tablet  Commonly known as:  COREG    Your last dose was: Your next dose is:          TAKE 1 TABLET TWICE A DAY WITH MEALS                  co-enzyme Q-10 50 mg capsule  Commonly known as:  CO Q-10    Your last dose was: Your next dose is: Take 50 mg by mouth daily (after lunch). 50 mg                 FISH OIL PO    Your last dose was: Your next dose is: Take 1 Tab by mouth daily (after lunch). 1 Tab                 JANUVIA 50 mg tablet  Generic drug:  SITagliptin    Your last dose was: Your next dose is: Take 50 mg by mouth daily. 50 mg                 LANTUS U-100 INSULIN 100 unit/mL injection  Generic drug:  insulin glargine    Your last dose was: Your next dose is:          22 Units by SubCUTAneous route Daily (before lunch). 22 Units                 levothyroxine 50 mcg tablet  Commonly known as:  SYNTHROID    Your last dose was: Your next dose is: Take 50 mcg by mouth Daily (before breakfast). 50 mcg                 melatonin 10 mg Cap    Your last dose was: Your next dose is: Take 10 mg by mouth nightly. 10 mg                 sertraline 100 mg tablet  Commonly known as:  ZOLOFT    Your last dose was: Your next dose is: Take 100 mg by mouth daily.    100 mg                 VITAMIN D2 50,000 unit capsule  Generic drug:  ergocalciferol    Your last dose was: Your next dose is: Take 50,000 Units by mouth every Sunday.    50,000 Units                        STOP taking these medications      cefUROXime 500 mg tablet  Commonly known as:  CEFTIN        cephALEXin 250 mg capsule  Commonly known as:  Omega Mends                 The patient's chart, MAR, and AVS were reviewed by   Bethany Leal,   Contact: [phone]

## 2019-12-27 NOTE — CONSULTS
[address]     Name:  Edgardo Ayala  MR#:  [id number]  :  1928  ACCOUNT #:  [de-identified]  DATE OF SERVICE:  2019    NEUROLOGIC CONSULTATION    REQUESTING PHYSICIAN:  Adriel Segal MD    HISTORY OF PRESENT ILLNESS:  Thank you for asking us to see the patient in neurologic consultation regarding acute change in her neurologic status manifested as alteration of mental status. She provides history and I have also reviewed her chart. Reviewed her CT scan of the head personally as well. In any regard, this is a 80-year-old lady who lives in the independent living cottages over at [facility]. She was actually seen by me about 4 years ago or so for falls. In any regard, she comes to the emergency room because she reported actually to the ED that she was having dyspnea. She tells me she came because her brain was not working properly and she was not thinking properly. When I asked her to expand on that, she cannot. She says that she was not remember things that she was supposed to remember. She tells me a couple of times that she lives in the [facility] and has 3 bedrooms and 2 cats. She denies any loss of consciousness. Denies any fall. Denies any changes in medicine. Denies any focal weakness. She apparently has a caregiver that reported to the ED that the patient did not feel well yesterday and a bit more confused than her baseline and that she was seen in the ED last week. Review of that note finds she was found to have a UTI. She has been taking her antibiotics sparingly. She did have a head CT that was unremarkable. Laboratory analysis from today are really unremarkable. CT scan unremarkable. PAST MEDICAL HISTORY:  She has a diagnosis of dementia although details are unknown.   When I saw her in 2017, my examination at that time found her to be cognitively spry, discussing her medical history, medications, as well as current events. She has also had a diagnosis of breast cancer, diabetes, dyslipidemia, gastroesophageal reflux, hypertension, hypothyroidism, melanoma, nonischemic cardiomyopathy status post ICD placement, sleep apnea with nocturnal O2, systolic heart failure, status post cataract surgery bilaterally, cholecystectomy, D and C, heart cath x2, hysterectomy, oophorectomy, left hand surgery, benign mass removal of the thorax, partial thyroidectomy, bladder surgery. MEDICATIONS:  As ordered, Coreg, Lantus, Zoloft, Ambien p.r.n., Zofran p.r.n., heparin, insulin. ALLERGIES:  NOTED TO BE TO BETA BLOCKERS, BROMIDES, SULFA. HOME MEDICINES:  Included Ceftin, Keflex, Synthroid, Januvia, Bumex,Tums, CoQ10, vitamin D, melatonin, fish oil, insulin, Coreg, Zoloft 100 mg. SOCIAL HISTORY:  She says that she has 3 or 4 glasses of wine per week. Chart says she has 3 glasses of wine and a shot of liquor a week. I do not think this is completely accurate in terms again of her cognitive state. REVIEW OF SYSTEMS:  Not deemed accurate. FAMILY HISTORY:  Noncontributory. PHYSICAL EXAMINATION:  GENERAL:  She is a pleasant elderly lady, lying in bed. VITAL SIGNS:  Afebrile, pulse 81, blood pressure 166/73, oxygen saturation in the high 90s to 100% on room air. HEENT:  Anicteric sclerae. Oropharynx is clear and moist.  NECK:  Supple neck. HEART:  Regular. Symmetric pulses. No edema. [place] Hind NEUROLOGIC:  She is awake, alert, oriented to [place], [place]. Tells me it is December and that Primrose was yesterday, but she does have a difficult time telling me what today is in terms of a date. She says that it is the day after Eddie and then finally she says Primrose was the 25th and then comes up with the 26. She cannot tell me the year. She does know the President when I give her his first name. She tells me the same story several times. She attends well. She follows commands.   No right and left confusion. She has pupils status post cataract surgery. Full versions. No nystagmus. Symmetric face and facial sensation. Her tongue and palate are midline. No pronation or drift. She resists fully in the upper and lower extremities in all muscle groups to direct testing. No abnormal movement. Age-related paratonia. Her reflexes symmetrical in upper and lower extremities bilaterally. No pathologic reflexes. No ataxia. Sensory intact to primary. STUDIES AND LABS:  As stated. IMPRESSION:  A 57-year-old lady who appears to be demented and I am not exactly sure what to make of the complaints of being more confused than usual because I do not know what her baseline level of confusion is. The only comparison I have is the note from 06/2017 where she was able to discuss her medications or medical history, etc.  At this point, I think the prudent thing to do is to exclude any other ongoing issue as you are. We will go ahead and send some other laboratory analysis from a dementia standpoint. Question whether alcohol may be playing a role here and will check a vitamin D1 level. We will give her some thiamine. Check an EEG. She cannot get her MRI due to her SCD, but I think a CT spine given her nonfocal examination except for her cognitive state. I think in the absence of having any other abnormality in terms of the laboratory analysis or the EEG, then following up with outpatient neuropsych would be in order. Certainly, I think case management needs to be involved in terms of making sure she has adequate supervision, although it sounds like she has some type of caregiver over at [address] [place] [address], but it sounds like she is still in the independent living and she does tell me she is a  and so that certainly needs to be addressed in terms of her safety. We will follow up on the blood work and the EEG. Thank you for your request for consultation.       KAREEM LUCIO, MD TIJERINA/DAGOBERTO_MEREDITH_I/[email or web address]_P  D:  12/26/2019 15:30  T:  12/26/2019 21:10  JOB #:  [id number]

## 2019-12-27 NOTE — PROGRESS NOTES
[facility]    Medical Progress Note      NAME: Yoselyn Granados   :  1928  MRM:  [id number]    Date/Time: 2019  10:22 AM          Assessment and Plan:     Dyspnea - POA, subjective. Normal labs, oxygen and xray. Suspect dementia, deconditioning and anxiety. Normal 6 minute walk.     JENN / Dehydration - POA, mild due to IVVD from diauretics. Gave IVF. Can resume ARB and metformin at discharge.     Acute metabolic encephalopathy / Cerebral microvascular disease / Dementia / Anxiety and depression / Hearing loss - Worse than baseline due to JENN. CT with severe microvascular disease. Monitor. Palliative consulted. Neuro consulted. Neuropsych outpatient consult. Continue Zoloft     HFrEF (heart failure with reduced ejection fraction) / Hypertension / LBBB (left bundle branch block) / Non-ischemic cardiomyopathy / Biventricular automatic implantable cardioverter defibrillator in situ - POA, stable, recent EF 45%. At home resume coreg, losartan, atorvastatin and bumex     DM type 2 (diabetes mellitus, type 2) with nephropathy - Diabetic diet and counseling.  SSI per protocol.  Continue home Lantus. A1c was 8.8.     Diarrhea - Chronic, on FloraQ     Sleep apnea - continue O2 at night, does not tolerate CPAP due to anxiety     Anemia - Stable. Chronic disease.  Taking MVI.  Heme follow up.     Hypothyroid - TSH WNL. Continue levothyroxine at homeopathic dose. I doubt she has hypothyroidism.      Breast CA - outpatient follow up     Weakness / Arthritis - Fall precautions. Pt/OT eval     Dyslipidemia - Unclear she is getting any benefit from statin, lacking a dx of CVA or CAD       Subjective:     Chief Complaint:  Back to baseline    ROS:  (bold if positive, if negative)    Tolerating some PT  Tolerating Diet        Objective:     Last 24hrs VS reviewed since prior progress note.  Most recent are:    Visit Vitals  /78 (BP 1 Location: Left arm, BP Patient Position: At rest)   Pulse 79 Temp 97.7 °F (36.5 °C)   Resp 20   Ht 5' 5\" (1.651 m)   Wt 79.2 kg (174 lb 9.7 oz)   SpO2 95%   BMI 29.06 kg/m²     SpO2 Readings from Last 6 Encounters:   12/27/19 95%   12/19/19 100%   08/03/19 96%   06/06/19 97%   05/23/19 95%   01/16/19 96%    O2 Flow Rate (L/min): 2 l/min       Intake/Output Summary (Last 24 hours) at 12/27/2019 1022  Last data filed at 12/26/2019 2329  Gross per 24 hour   Intake    Output 375 ml   Net -375 ml        Physical Exam:    Gen:  Well-developed, well-nourished, in no acute distress  HEENT:  Pink conjunctivae, PERRL, hearing intact to voice, moist mucous membranes  Neck:  Supple, without masses, thyroid non-tender  Resp:  No accessory muscle use, clear breath sounds without wheezes rales or rhonchi  Card:  No murmurs, normal S1, S2 without thrills, bruits or peripheral edema  Abd:  Soft, non-tender, non-distended, normoactive bowel sounds are present, no mass  Lymph:  No cervical or inguinal adenopathy  Musc:  No cyanosis or clubbing  Skin:  No rashes or ulcers, skin turgor is good  Neuro:  Cranial nerves are grossly intact, mild motor weakness, follows commands vaguely  Psych:  Poor insight, oriented to person    Telemetry reviewed:   normal sinus rhythm  __________________________________________________________________  Medications Reviewed: (see below)  Medications:     Current Facility-Administered Medications   Medication Dose Route Frequency    carvedilol (COREG) tablet 6.25 mg  6.25 mg Oral BID WITH MEALS    insulin glargine (LANTUS) injection 22 Units  22 Units SubCUTAneous QPM    sertraline (ZOLOFT) tablet 100 mg  100 mg Oral DAILY    sodium chloride (NS) flush 5-40 mL  5-40 mL IntraVENous Q8H    sodium chloride (NS) flush 5-40 mL  5-40 mL IntraVENous PRN    zolpidem (AMBIEN) tablet 5 mg  5 mg Oral QHS PRN    glucose chewable tablet 16 g  4 Tab Oral PRN    glucagon (GLUCAGEN) injection 1 mg  1 mg IntraMUSCular PRN    dextrose 10% infusion 0-250 mL  0-250 mL IntraVENous PRN    acetaminophen (TYLENOL) tablet 650 mg  650 mg Oral Q4H PRN    diphenhydrAMINE (BENADRYL) capsule 25 mg  25 mg Oral Q4H PRN    ondansetron (ZOFRAN) injection 4 mg  4 mg IntraVENous Q4H PRN    heparin (porcine) injection 5,000 Units  5,000 Units SubCUTAneous Q8H    insulin lispro (HUMALOG) injection   SubCUTAneous AC&HS    thiamine (B-1) 100 mg in 0.9% sodium chloride 50 mL IVPB  100 mg IntraVENous Q24H        Lab Data Reviewed: (see below)  Lab Review:     Recent Labs     12/27/19  0049 12/26/19  1203   WBC 4.6 5.4   HGB 8.5* 9.3*   HCT 25.4* 27.3*    242     Recent Labs     12/27/19  0049 12/26/19  1203    136   K 3.7 4.1   * 103   CO2 25 23   GLU 83 162*   BUN 28* 38*   CREA 1.16* 1.46*   CA 8.2* 8.4*   MG 2.0 1.9   PHOS 2.6  --    ALB 3.0* 3.4*   TBILI 0.4 0.5   SGOT 14* 16   ALT 15 18     Lab Results   Component Value Date/Time    Glucose (POC) 110 (H) 12/27/2019 07:01 AM    Glucose (POC) 223 (H) 12/26/2019 09:27 PM    Glucose (POC) 127 (H) 12/26/2019 05:08 PM    Glucose (POC) 234 (H) 08/03/2019 11:07 AM    Glucose (POC) 165 (H) 08/03/2019 06:50 AM     No results for input(s): PH, PCO2, PO2, HCO3, FIO2 in the last 72 hours. No results for input(s): INR, INREXT in the last 72 hours.   All Micro Results     Procedure Component Value Units Date/Time    RESPIRATORY PANEL,PCR,NASOPHARYNGEAL [[id number]] Collected:  12/26/19 1930    Order Status:  Completed Specimen:  Nasopharyngeal Updated:  12/26/19 2206     Adenovirus NOT DETECTED        Coronavirus 229E NOT DETECTED        Coronavirus HKU1 NOT DETECTED        Coronavirus CVNL63 NOT DETECTED        Coronavirus OC43 NOT DETECTED        Metapneumovirus NOT DETECTED        Rhinovirus and Enterovirus NOT DETECTED        Influenza A NOT DETECTED        Influenza A, subtype H1 NOT DETECTED        Influenza A, subtype H3 NOT DETECTED        INFLUENZA A H1N1 PCR NOT DETECTED        Influenza B NOT DETECTED        Parainfluenza 1 NOT DETECTED        Parainfluenza 2 NOT DETECTED        Parainfluenza 3 NOT DETECTED        Parainfluenza virus 4 NOT DETECTED        RSV by PCR NOT DETECTED        B. parapertussis, PCR NOT DETECTED        Bordetella pertussis - PCR NOT DETECTED        Chlamydophila pneumoniae DNA, QL, PCR NOT DETECTED        Mycoplasma pneumoniae DNA, QL, PCR NOT DETECTED       CULTURE, URINE [[id number]] Collected:  12/26/19 1346    Order Status:  Completed Specimen:  Cath Urine Updated:  12/26/19 8656    CULTURE, URINE [[id number]]     Order Status:  Canceled Specimen:  Cath Urine           Other pertinent lab: none    Total time spent with patient: 39 Minutes I reviewed chart, notes, data and current medications in the medical record. I have examined and treated the patient at bedside during this period.                  Care Plan discussed with: Patient, Care Manager, Nursing Staff, Consultant/Specialist and >50% of time spent in counseling and coordination of care    Discussed:  Care Plan and D/C Planning    Prophylaxis:  H2B/PPI    Disposition:  Home w/Family           ___________________________________________________    Attending Physician: Duane Peek, MD

## 2019-12-27 NOTE — PROGRESS NOTES
[address]   [address], [facility] [address]    ASCE[facility]   [phone] Fax         Confusion  Chart reviewed  No events overnight    EEG reviewed and nml  B12 nml  Free T4 nml  TSH nml  B1 pnd  Ammonia Nml  RPR pnd    CT head unremarkable    She has no complaints this am  Reports she had no memory issues until she had her UTI  Discusses her LT care insurance and how her dtr is going to have a nurse morris and stay with her    Visit Vitals  /78 (BP 1 Location: Left arm, BP Patient Position: At rest)   Pulse 79   Temp 97.7 °F (36.5 °C)   Resp 20   Ht 5' 5\" (1.651 m)   Wt 79.2 kg (174 lb 9.7 oz)   SpO2 95%   BMI 29.06 kg/m²     Awake, alert and oriented to Dec 2019, ?  [facility]  No motor focality    Imp/plan  Likely underlying mild dementia  Would have her see Dr. Maurice Thorpe as OP once discharged    Will return here PRN  No further reinaldo workup needed her     Elsi Ramirez MD

## 2019-12-27 NOTE — PROGRESS NOTES
Cardiology Progress Note         NAME:  Kashmir Borden   :   1928   MRN:   [id number]     Assessment/Plan:   Dyspnea  Altered mental status/history of dementia  Acute on chronic renal insufficiency  Nonischemic cardiomyopathy/chronic HFrEF/status post  BiVi AICD  - EF 45%  DM  AMIRA - cannot tolerate CPAP, O2 at night     Plan:   PTA - Bumex 1 mg daily, Coreg, and (?) losartan 25mg every other day  Concern for worsening of renal function/dehydration on diuretics. Renal fx now improving - recommend reduced dose Bumex 0.5mg/d at [facility]. Objectively no signs of CHF exacerbation  Continue beta-blocker and restart losartan at [facility].       Follow-up arranged 2020 at 3:40pm with Dr. Constantino Lozano. CARDIOLOGY ATTENDING  Patient personally seen and examined. All the elements of history and examination were personally performed. Assessment and plan was discussed and agree as written above    She is looking and feeling better when I see her. Will cut back Bumex dose (may even stop it eventually). OK for DC today. Thalia Sierra MD, [facility]            Subjective:     Pt today feeling much improved. States she is thinking more clearly. A&O x3 in room. Mild short term memory deficits noted but good long term history provided. Cardiac ROS: Patient denies any chest pain, dyspnea, palpitations, syncope, orthopnea, edema or paroxysmal nocturnal dyspnea. Slept well overnight without issue. Has been taking bumex 1mg/d at home; tries to stay well hydrated. Lives alone but has nurse check in with her daily. Has 3 children but [place]. Losartan not on PTA med list; dc'd . Previously documented to take losartan 25mg every other day. Pt believes she was still taking this med but it's difficult to confirm.       Previous Cardiac Eval  19   ECHO ADULT COMPLETE 2019    Narrative · Normal cavity size, wall thickness and systolic function (ejection fraction normal). Estimated left ventricular ejection fraction is 45 -   50%. No regional wall motion abnormality noted. Mild (grade 1) left   ventricular diastolic dysfunction. · Mitral valve thickening. Moderate mitral annular calcification. Mild   mitral valve regurgitation is present. Signed by: Dilma English MD     Review of Systems: Denies fever, chills, n/v, abd pain, diarrhea, and constipation. Objective:     Visit Vitals  /70 (BP 1 Location: Left arm, BP Patient Position: At rest;Head of bed elevated (Comment degrees))   Pulse 72   Temp 98.1 °F (36.7 °C)   Resp 16   Ht 5' 5\" (1.651 m)   Wt 79.2 kg (174 lb 9.7 oz)   SpO2 93%   BMI 29.06 kg/m²    O2 Flow Rate (L/min): 2 l/min O2 Device: Room air    Temp (24hrs), Av.9 °F (36.6 °C), Min:97.7 °F (36.5 °C), Max:98.1 °F (36.7 °C)      No intake/output data recorded.  1901 -  0700  In: -   Out: 375 [Urine:375]     General: AAOx3 cooperative, no acute distress, elderly   HEENT: Atraumatic. Pink and moist.  Anicteric sclerae. Neck : Supple, no JVD   Lungs: CTA bilaterally. No wheezing/rhonchi/rales. Heart: RRR, no m / r / g   Abdomen: Soft, non-distended, non-tender. + Bowel sounds. Extremities: No edema, No calf tenderness  Neurologic: Alert and oriented X 3. No acute neurological distress. Psych: Good insight. Not anxious nor agitated.     Additional comments: None    Care Plan discussed with:    Comments   Patient x    Family      RN x    Care Manager                    Consultant:          Data Review:     No lab exists for component: ITNL   Recent Labs     19  1077 [address] Christiano <0.05     Recent Labs     19  0049 19  1203    136   K 3.7 4.1   * 103   CO2 25 23   BUN 28* 38*   CREA 1.16* 1.46*   GLU 83 162*   PHOS 2.6  --    MG 2.0 1.9   ALB 3.0* 3.4*   WBC 4.6 5.4   HGB 8.5* 9.3*   HCT 25.4* 27.3*    242     No results for input(s): INR, PTP, APTT, INREXT in the last 72 hours.    Medications reviewed  Current Facility-Administered Medications   Medication Dose Route Frequency    carvedilol (COREG) tablet 6.25 mg  6.25 mg Oral BID WITH MEALS    insulin glargine (LANTUS) injection 22 Units  22 Units SubCUTAneous QPM    sertraline (ZOLOFT) tablet 100 mg  100 mg Oral DAILY    sodium chloride (NS) flush 5-40 mL  5-40 mL IntraVENous Q8H    sodium chloride (NS) flush 5-40 mL  5-40 mL IntraVENous PRN    zolpidem (AMBIEN) tablet 5 mg  5 mg Oral QHS PRN    glucose chewable tablet 16 g  4 Tab Oral PRN    glucagon (GLUCAGEN) injection 1 mg  1 mg IntraMUSCular PRN    dextrose 10% infusion 0-250 mL  0-250 mL IntraVENous PRN    acetaminophen (TYLENOL) tablet 650 mg  650 mg Oral Q4H PRN    diphenhydrAMINE (BENADRYL) capsule 25 mg  25 mg Oral Q4H PRN    ondansetron (ZOFRAN) injection 4 mg  4 mg IntraVENous Q4H PRN    heparin (porcine) injection 5,000 Units  5,000 Units SubCUTAneous Q8H    insulin lispro (HUMALOG) injection   SubCUTAneous AC&HS    thiamine (B-1) 100 mg in 0.9% sodium chloride 50 mL IVPB  100 mg IntraVENous Q24H         Armani Jeffries NP

## 2019-12-27 NOTE — DISCHARGE SUMMARY
Physician Discharge Summary     Patient ID:  Hay Henson  [id number]  80 y.o.  7/5/1928    Admit date: 12/26/2019    Discharge date and time: 12/27/2019    Admission Diagnoses: Dyspnea [R06.00]    Discharge Diagnoses:    Principal Diagnosis     Dementia                                               Hospital Course and other diagnoses  Dyspnea - POA, subjective.  Normal labs, oxygen and xray.  Suspect dementia, deconditioning and anxiety.  Normal 6 minute walk.     JENN / Dehydration - POA, mild due to IVVD from diauretics. Gave IVF. Can resume ARB and metformin at discharge.     Acute metabolic encephalopathy Ramin Sor microvascular disease / Dementia Naya Cobb and depression / Hearing loss - Worse than baseline due to JENN.  CT with severe microvascular disease.  Monitor. Palliative consulted. [facility] consulted.  Neuropsych outpatient consult. Continue Zoloft     HFrEF (heart failure with reduced ejection fraction) / Hypertension / LBBB (left bundle branch block) / Non-ischemic cardiomyopathy / Biventricular automatic implantable cardioverter defibrillator in situ - POA, stable, recent EF 45%. At home resume coreg, losartan, atorvastatin and bumex     DM type 2 (diabetes mellitus, type 2) with nephropathy - Diabetic diet and counseling.  SSI per protocol.  Continue home Lantus. A1c was 8.8.     Diarrhea - Chronic, on FloraQ     Sleep apnea - continue O2 at night, does not tolerate CPAP due to anxiety     Anemia - Stable. Chronic disease.  Taking MVI.  Heme follow up.     Hypothyroid - TSH WNL.  Continue levothyroxine at homeopathic dose.  I doubt she has hypothyroidism.      Breast CA - outpatient follow up     Weakness / Arthritis - Fall precautions.  Pt/OT eval. Likely needs transfer to [place] by family     Dyslipidemia - Unclear she is getting any benefit from statin, lacking a dx of CVA or CAD     PCP: Osiris Lopez MD    Consults: Neurology    Significant Diagnostic Studies: See Hospital Course    Discharged home in improved condition. Discharge Exam:  /78 (BP 1 Location: Left arm, BP Patient Position: At rest)   Pulse 79   Temp 97.7 °F (36.5 °C)   Resp 20   Ht 5' 5\" (1.651 m)   Wt 79.2 kg (174 lb 9.7 oz)   SpO2 95%   BMI 29.06 kg/m²      Gen:  Well-developed, well-nourished, in no acute distress  HEENT:  Pink conjunctivae, PERRL, hearing intact to voice, moist mucous membranes  Neck:  Supple, without masses, thyroid non-tender  Resp:  No accessory muscle use, clear breath sounds without wheezes rales or rhonchi  Card:  No murmurs, normal S1, S2 without thrills, bruits or peripheral edema  Abd:  Soft, non-tender, non-distended, normoactive bowel sounds are present, no mass  Lymph:  No cervical or inguinal adenopathy  Musc:  No cyanosis or clubbing  Skin:  No rashes or ulcers, skin turgor is good  Neuro:  Cranial nerves are grossly intact, mild motor weakness, follows commands vaguely  Psych:  Poor insight, oriented to person    Patient Instructions:   Current Discharge Medication List      START taking these medications    Details   losartan (COZAAR) 25 mg tablet Take 1 Tab by mouth every other day. Qty: 15 Tab, Refills: 0         CONTINUE these medications which have CHANGED    Details   bumetanide (BUMEX) 0.5 mg tablet Take 1 Tab by mouth daily. Qty: 30 Tab, Refills: 0    Associated Diagnoses: SOB (shortness of breath); Chronic systolic heart failure ([facility] [address] Essential hypertension         CONTINUE these medications which have NOT CHANGED    Details   levothyroxine (SYNTHROID) 50 mcg tablet Take 50 mcg by mouth Daily (before breakfast). insulin glargine (LANTUS U-100 INSULIN) 100 unit/mL injection 22 Units by SubCUTAneous route Daily (before lunch). SITagliptin (JANUVIA) 50 mg tablet Take 50 mg by mouth daily. calcium carbonate (TUMS) 200 mg calcium (500 mg) chew Take 1 Tab by mouth three (3) times daily as needed (GERD).       carvedilol (COREG) 6.25 mg tablet TAKE 1 TABLET TWICE A DAY WITH MEALS  Qty: 180 Tab, Refills: 0    Associated Diagnoses: SOB (shortness of breath); Chronic systolic heart failure ([facility] [address] Essential hypertension      co-enzyme Q-10 (CO Q-10) 50 mg capsule Take 50 mg by mouth daily (after lunch). sertraline (ZOLOFT) 100 mg tablet Take 100 mg by mouth daily. ergocalciferol (VITAMIN D2) 50,000 unit capsule Take 50,000 Units by mouth every Sunday. melatonin 10 mg cap Take 10 mg by mouth nightly. DOCOSAHEXANOIC ACID/EPA (FISH OIL PO) Take 1 Tab by mouth daily (after lunch). STOP taking these medications       cephALEXin (KEFLEX) 250 mg capsule Comments:   Reason for Stopping:         cefUROXime (CEFTIN) 500 mg tablet Comments:   Reason for Stopping:             Activity: Activity as tolerated and PT/OT per Home Health  Diet: Cardiac Diet and Low fat, Low cholesterol  Wound Care: None needed    Follow-up with your PCP in a few weeks.   Follow-up tests/labs - neropsychiatry testing    Signed:  Susana Mendez MD  12/27/2019  10:28 AM

## 2019-12-27 NOTE — DISCHARGE INSTRUCTIONS
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 · Keep your medication in the bottles provided by the pharmacist and keep a list of the medication names, dosages, and times to be taken in your wallet. · Do not take other medications without consulting your doctor. What to do at Home    Recommended diet: Cardiac Diet and Diabetic Diet    Recommended activity: Activity as tolerated    If you experience worse symptoms, please follow up with outpatient neuropsychiatry. Follow-up with your PCP in a few weeks    Patient Education        Dementia: Care Instructions  Your Care Instructions    Dementia is a loss of mental skills that affects your daily life. It is different than the occasional trouble with memory that is part of aging. You may find it hard to remember things that you feel you should be able to remember. Or you may feel that your mind is just not working as well as usual.  Finding out that you have dementia is a shock. You may be afraid and worried about how the condition will change your life. Although there is no cure at this time, medicine may slow memory loss and improve thinking for a while.  Other medicines may be able to help you sleep or cope with depression and behavior changes. Dementia often gets worse slowly. But it can get worse quickly. As dementia gets worse, it may become harder to do common things that take planning, like making a list and going shopping. Over time, the disease may make it hard for you to take care of yourself. Some people with dementia need others to help care for them. Dementia is different for everyone. You may be able to function well for a long time. In the early stage of the condition, you can do things at home to make life easier and safer. You also can keep doing your hobbies and other activities. Many people find comfort in planning now for their future needs. Follow-up care is a key part of your treatment and safety. Be sure to make and go to all appointments, and call your doctor if you are having problems. It's also a good idea to know your test results and keep a list of the medicines you take. How can you care for yourself at home? · Take your medicines exactly as prescribed. Call your doctor if you think you are having a problem with your medicine. · Eat healthy foods. Eat lots of whole grains, fruits, and vegetables every day. If you are not hungry, try snacks or nutritional drinks such as Boost, Ensure, or Sustacal.  · If you have problems sleeping:  ? Try not to nap too close to your bedtime. ? Exercise regularly. Walking is a good choice. ? Try a glass of warm milk or caffeine-free herbal tea before bed. · Do tasks and activities during the time of day when you feel your best. It may help to develop a daily routine. · Post labels, lists, and sticky notes to help you remember things. Write your activities on a calendar you can easily find. Put your clock where you can easily see it. · Stay active. Take walks in familiar places, or with friends or loved ones. Try to stay active mentally too. Read and work crossword puzzles if you enjoy these activities.   · Do not drive unless you can pass an on-road driving test. If you are not sure if you are safe to drive, your state 's license bureau can test you. · Keep a cordless phone and a flashlight with new batteries by your bed. If possible, put a phone in each of the main rooms of your house, or carry a cell phone in case you fall and cannot reach a phone. Or, you can wear a device around your neck or wrist. You push a button that sends a signal for help. Acknowledge your emotions and plan for the future  · Talk openly and honestly with your doctor. · Let yourself grieve. It is common to feel angry, scared, frustrated, anxious, or depressed. · Get emotional support from family, friends, a support group, or a counselor experienced in working with people who have dementia. · Ask for help if you need it. · Tell your doctor how you feel. You may feel upset, angry, or worried at times. Many things can cause this, including poor sleep, medicine side effects, confusion, and pain. Your doctor may be able to help you. · Plan for the future. ? Talk to your family and doctor about preparing a living will and other important papers while you can make decisions. These papers tell your doctors how to care for you at the end of your life. ? Consider naming a person to make decisions about your care if you are not able to. When should you call for help? Call 911 anytime you think you may need emergency care. For example, call if:    · You are lost and do not know whom to call.     · You are injured and do not know whom to call.    Call your doctor now or seek immediate medical care if:    · You are more confused or upset than usual.     · You feel like you could hurt yourself because your mind is not working well.   Kade Earlying closely for changes in your health, and be sure to contact your doctor if you have any problems. Where can you learn more? Go to http://smita-armand.info/.   Enter K968 in the search box to learn more about \"Dementia: Care Instructions. \"  Current as of: May 28, 2019  Content Version: 12.2  © 9759-5614 [employer]. Care instructions adapted under license by [employer] (which disclaims liability or warranty for this information). If you have questions about a medical condition or this instruction, always ask your healthcare professional. [place] [address] any warranty or liability for your use of this information. Information obtained by :  I understand that if any problems occur once I am at home I am to contact my physician. I understand and acknowledge receipt of the instructions indicated above.                                                                                                                                            Physician's or R.N.'s Signature                                                                  Date/Time                                                                                                                                              Patient or Representative Signature                                                          Date/Time

## 2019-12-27 NOTE — PROGRESS NOTES
Bedside and Verbal shift change report given to Tor Guevara RN (oncoming nurse) by Lucian Botello RN (offgoing nurse). Report included the following information SBAR, Kardex, Intake/Output, MAR and Recent Results.

## 2019-12-27 NOTE — PROGRESS NOTES
Reason for Admission:   Dyspnea               RRAT Score:    44              Resources/supports as identified by patient/family:   Has supportive daughters (both live out of state: Horacio Stone- [phone] and Ismael Jacobs ) and caregivers provided by Laura Riggs ([phone]). Pt also has a family friend, Kristopher Milligan, who checks on her daily. Top Challenges facing patient (as identified by patient/family and CM): Finances/Medication cost?      Has Rx coverage. Uses [employer]? Relies on friend or caregivers              Support system or lack thereof? See above                     Living arrangements? Pt lives at [facility] in [place]. Pt is refusing AL. She has long term care insurance and will have 24 hour caregivers provided by Laura Riggs. Self-care/ADLs/Cognition? Pt reportedly walks unaided and is independent with ADLs. Current Advanced Directive/Advance Care Plan: On file                          [facility]                 Transition of Care Plan:        1. Pt will return home with 24/7 care provided by Bud Schwartz. This was confirmed by friend/caregiver, Kristopher Milligan and daughter Ismael Jacobs. Pt's friend or caregiver will transport home. 2. Pt's daughter, Ismael Jacobs, will be staying with pt 12/28-12/31/19. 3. [place] has been arranged. 4. Observation letter explained and provided to pt.       Care Management Interventions  PCP Verified by CM: Yes(Dr. Melchor Couch; no nurse navigator)  Palliative Care Criteria Met (RRAT>21 & CHF Dx)?: Yes  Transition of Care Consult (CM Consult): Home Health([place])  [address]: Yes  Discharge Durable Medical Equipment: No  Physical Therapy Consult: Yes  Occupational Therapy Consult: Yes  Speech Therapy Consult: No  Current Support Network: Lives Alone, Has Personal Caregivers  Confirm Follow Up Transport: Friends  The Patient and/or Patient Representative was Provided with a Choice of Provider and Agrees with the Discharge Plan?: Yes  Name of the Patient Representative Who was Provided with a Choice of Provider and Agrees with the Discharge Plan:  [facility]  Freedom of Choice List was Provided with Basic Dialogue that Supports the Patient's Individualized Plan of Care/Goals, Treatment Preferences and Shares the Quality Data Associated with the Providers?: Yes  Discharge Location  Discharge Placement: Other:(Home with 24/7 supervision and H)     Darrian Espinoza LCSW

## 2019-12-27 NOTE — PROGRESS NOTES
Bedside shift change report given to Jolanta (oncoming nurse) by Nemo Ramirez (offgoing nurse). Report included the following information SBAR, Kardex, Intake/Output and Recent Results. Discharge medications reviewed with caregiver and appropriate educational materials and side effects teaching were provided. I have reviewed discharge instructions with the caregiver. The caregiver verbalized understanding.

## 2019-12-28 LAB
BACTERIA SPEC CULT: NORMAL
CC UR VC: NORMAL
SERVICE CMNT-IMP: NORMAL
VIT B1 BLD-SCNC: 106.7 NMOL/L (ref 66.5–200)

## 2019-12-31 ENCOUNTER — HOME CARE VISIT (OUTPATIENT)
Dept: SCHEDULING | Facility: HOME HEALTH | Age: 84
End: 2019-12-31

## 2019-12-31 PROCEDURE — G0299 HHS/HOSPICE OF RN EA 15 MIN: HCPCS

## 2020-01-23 ENCOUNTER — OFFICE VISIT (OUTPATIENT)
Dept: CARDIOLOGY CLINIC | Age: 85
End: 2020-01-23

## 2020-01-23 VITALS
OXYGEN SATURATION: 95 % | DIASTOLIC BLOOD PRESSURE: 52 MMHG | RESPIRATION RATE: 16 BRPM | HEIGHT: 65 IN | HEART RATE: 82 BPM | WEIGHT: 172 LBS | SYSTOLIC BLOOD PRESSURE: 108 MMHG | BODY MASS INDEX: 28.66 KG/M2

## 2020-01-23 DIAGNOSIS — I42.8 NICM (NONISCHEMIC CARDIOMYOPATHY) (HCC): Primary | ICD-10-CM

## 2020-01-23 DIAGNOSIS — I10 ESSENTIAL HYPERTENSION: ICD-10-CM

## 2020-01-23 DIAGNOSIS — Z95.810 BIVENTRICULAR AUTOMATIC IMPLANTABLE CARDIOVERTER DEFIBRILLATOR IN SITU: ICD-10-CM

## 2020-01-23 DIAGNOSIS — I50.22 CHRONIC SYSTOLIC HEART FAILURE (HCC): ICD-10-CM

## 2020-01-23 RX ORDER — LOSARTAN POTASSIUM 25 MG/1
12.5 TABLET ORAL DAILY
Qty: 15 TAB | Refills: 0 | Status: SHIPPED | OUTPATIENT
Start: 2020-01-23

## 2020-01-23 NOTE — PROGRESS NOTES
Chief Complaint   Patient presents with   [facility] Follow Up    Breathing Problem     Visit Vitals  /52 (BP 1 Location: Left arm, BP Patient Position: Sitting)   Pulse 82   Resp 16   Ht 5' 5\" (1.651 m)   Wt 172 lb (78 kg)   SpO2 95%   BMI 28.62 kg/m²     Patient presents in office with c/o \"fuzzy thoughts. \" She states that her Debarah Majestic is not quite right. \" She \"doesn't feel great today. \" Denies SOB, CP, Palps, Dizziness.

## 2020-01-23 NOTE — PROGRESS NOTES
Avis Yanez, [facility]  [id number]  [place], [address] [facility]    Office [phone]  Fax [phone]      Patient: Paris Nathan  : 1928      Today's Date: 2020      HISTORY OF PRESENT ILLNESS:     History of Present Illness:  Here for FU. Had recent admission with JENN / dehydration. Also with c/o dyspena without clear cause; noted to have worsening dementia / deconditioning. Inpt  to . Bumex dose decreased at discharge to 0.5mg/d. Pt today states she's doing OK; states mental status not at baseline. Feels foggy and not able to think clearly. Has nursing support  and lives in [facility]. No family in town; lives alone. Patient denies any exertional chest pain, new dyspnea, palpitations, syncope, orthopnea, edema, or paroxysmal nocturnal dyspnea. Nurses manage meds. Denies known dizziness or lightheadedness. In office with nursing aid; in wheelchair.       PAST MEDICAL HISTORY:     Past Medical History:   Diagnosis Date    Arthritis     Breast cancer ([address]     Cerebral microvascular disease     Dementia ([address]     Diabetes ([address]     Dyslipidemia     GERD (gastroesophageal reflux disease)     Hearing loss     Hypertension     Hypothyroid     LBBB (left bundle branch block)     Melanoma ([address]     Non-ischemic cardiomyopathy ([address]     Cath 2/16/15 - Mild (non-obstructive) CAD,LVEF 30%, 2+ MR    Skin cancer     melanoma on legs    Sleep apnea     wears O2 at night- cannot tolerate CPAP    Systolic CHF ([address]     Admission 16         Past Surgical History:   Procedure Laterality Date    HX BREAST LUMPECTOMY      right    HX CATARACT REMOVAL      bilateral    HX CHOLECYSTECTOMY      HX DILATION AND CURETTAGE      HX HEART CATHETERIZATION      x2- no blockages    HX HEENT      surgery on upper jaw    HX HYSTERECTOMY      HX HYSTEROSCOPY      HX OOPHORECTOMY  1967    left    HX ORTHOPAEDIC 2010    right knee replacement    HX ORTHOPAEDIC      repair torn meniscus left knee    HX ORTHOPAEDIC  1996    repair knuckle on left pinky finger    HX OTHER SURGICAL  2000    remove benign tumore right side between rib and right lung    HX PACEMAKER      HX PARTIAL THYROIDECTOMY      HX UROLOGICAL  2003    bladder surgery x2           MEDICATIONS:     Current Outpatient Medications   Medication Sig Dispense Refill    losartan (COZAAR) 25 mg tablet Take 0.5 Tabs by mouth daily. 15 Tab 0    levothyroxine (SYNTHROID) 50 mcg tablet Take 50 mcg by mouth Daily (before breakfast).  insulin glargine (LANTUS U-100 INSULIN) 100 unit/mL injection 22 Units by SubCUTAneous route Daily (before lunch).  SITagliptin (JANUVIA) 50 mg tablet Take 50 mg by mouth daily.  calcium carbonate (TUMS) 200 mg calcium (500 mg) chew Take 1 Tab by mouth three (3) times daily as needed (GERD).  carvedilol (COREG) 6.25 mg tablet TAKE 1 TABLET TWICE A DAY WITH MEALS 180 Tab 0    co-enzyme Q-10 (CO Q-10) 50 mg capsule Take 50 mg by mouth daily (after lunch).  sertraline (ZOLOFT) 100 mg tablet Take 100 mg by mouth daily.  melatonin 10 mg cap Take 10 mg by mouth nightly.  DOCOSAHEXANOIC ACID/EPA (FISH OIL PO) Take 1 Tab by mouth daily (after lunch). Allergies   Allergen Reactions    Beta-Blockers (Beta-Adrenergic Blocking Agts) Unknown (comments)     Made her feel tired, breakout on her back    Other Medication Unknown (comments)     Bromides    Phenobarbital Unknown (comments)    Sulfa (Sulfonamide Antibiotics) Swelling     rash       SOCIAL HISTORY:     Social History     Tobacco Use    Smoking status: Never Smoker    Smokeless tobacco: Never Used   Substance Use Topics    Alcohol use:  Yes     Alcohol/week: 4.0 standard drinks     Types: 3 Glasses of wine, 1 Shots of liquor per week    Drug use: No       FAMILY HISTORY:     Family History   Problem Relation Age of Onset    Stroke Mother     REVIEW OF SYSTEMS:             Review of Symptoms:  Constitutional: Negative for fever, chills, and diaphoresis. Respiratory: Negative for cough, hemoptysis, sputum production, and wheezing. Cardiovascular: Negative for chest pain, palpitations, orthopnea, claudication, leg swelling and PND. Gastrointestinal: Negative for heartburn, nausea, vomiting, blood in stool and melena. Genitourinary: Negative for dysuria and flank pain. Neurological: Negative for focal weakness, seizures, loss of consciousness,and headaches. Endo/Heme/Allergies: Negative for abnormal bleeding. Psychiatric/Behavioral: +memory loss         PHYSICAL EXAM:             Physical Exam:  Visit Vitals  /52 (BP 1 Location: Left arm, BP Patient Position: Sitting)   Pulse 82   Resp 16   Ht 5' 5\" (1.651 m)   Wt 172 lb (78 kg)   SpO2 95%   BMI 28.62 kg/m²     BP Readings from Last 3 Encounters:   01/23/20 108/52   12/27/19 152/70   12/19/19 138/63     Wt Readings from Last 3 Encounters:   01/23/20 172 lb (78 kg)   12/26/19 174 lb 9.7 oz (79.2 kg)   12/19/19 171 lb 15.3 oz (78 kg)     Patient appears generally well, mood and affect are appropriate and pleasant. In wheelchair  HEENT:  Hearing intact, non-icteric, normocephalic, atraumatic.    Neck Exam: Supple, No sig JVD sitting up.    Lung Exam: Clear to auscultation, even breath sounds.    Cardiac Exam: Regular rate and rhythm with no murmur  Abdomen: Soft, non-tender    Extremities: No lower extremity edema.   Psych: Appropriate affect  Neuro - Grossly intact, mild memory deficits noted      LABS / OTHER STUDIES:              Lab Results   Component Value Date/Time    Sodium 142 12/27/2019 12:49 AM    Potassium 3.7 12/27/2019 12:49 AM    Chloride 111 (H) 12/27/2019 12:49 AM    CO2 25 12/27/2019 12:49 AM    Anion gap 6 12/27/2019 12:49 AM    Glucose 83 12/27/2019 12:49 AM    BUN 28 (H) 12/27/2019 12:49 AM    Creatinine 1.16 (H) 12/27/2019 12:49 AM    BUN/Creatinine ratio 24 (H) 12/27/2019 12:49 AM    GFR est AA 53 (L) 12/27/2019 12:49 AM    GFR est non-AA 44 (L) 12/27/2019 12:49 AM    Calcium 8.2 (L) 12/27/2019 12:49 AM    Bilirubin, total 0.4 12/27/2019 12:49 AM    AST (SGOT) 14 (L) 12/27/2019 12:49 AM    Alk. phosphatase 46 12/27/2019 12:49 AM    Protein, total 5.9 (L) 12/27/2019 12:49 AM    Albumin 3.0 (L) 12/27/2019 12:49 AM    Globulin 2.9 12/27/2019 12:49 AM    A-G Ratio 1.0 (L) 12/27/2019 12:49 AM    ALT (SGPT) 15 12/27/2019 12:49 AM     Lab Results   Component Value Date/Time    WBC 4.6 12/27/2019 12:49 AM    Hemoglobin (POC) 12.6 01/15/2014 12:49 PM    HGB 8.5 (L) 12/27/2019 12:49 AM    Hematocrit (POC) 37 01/15/2014 12:49 PM    HCT 25.4 (L) 12/27/2019 12:49 AM    PLATELET 780 35/93/5930 12:49 AM    .4 (H) 12/27/2019 12:49 AM       Lab Results   Component Value Date/Time    Cholesterol, total 184 02/02/2017 12:00 PM    HDL Cholesterol 57 02/02/2017 12:00 PM    LDL, calculated 52 02/02/2017 12:00 PM    VLDL, calculated 75 (H) 02/02/2017 12:00 PM    Triglyceride 377 (H) 02/02/2017 12:00 PM     Lab Results   Component Value Date/Time    TSH 0.79 12/26/2019 12:03 PM       CARDIAC DIAGNOSTICS:                 Cardiac Evaluation Includes:  EKG 2/13/16 - NSR, IVCD, PRWP      Echo 2/13/16 - TDS.   LVEF 30-35%.   There was moderate diffuse hypokinesis - Only the basal anterolateral and infrolateral walls contracts normally.  RV size and function normal.  RVSP 35      CTA chest 2/13/16 - No evidence of pulmonary embolus. Mild pulmonary interstitial edema and small bilateral pleural effusions.  Small bilateral indeterminate pulmonary nodules, measuring up to 4 mm.      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 Moderate to severe deep white matter ischemic change. 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 No new confluent infarct or hemorrhage or mass effect.      Echo 2/8/18  - LVEF 50%, grade 1 diastology, RV normal, RVSP 35     Echo 11/15/18 - LVEF 45%     12/19/19   ECHO ADULT COMPLETE 12/20/2019 12/20/2019    Narrative · Normal cavity size, wall thickness and systolic function (ejection   fraction normal). Estimated left ventricular ejection fraction is 45 -   50%. No regional wall motion abnormality noted. Mild (grade 1) left   ventricular diastolic dysfunction. · Mitral valve thickening. Moderate mitral annular calcification. Mild   mitral valve regurgitation is present.         Signed by: Drake Lopez MD         ASSESSMENT AND PLAN:             Assessment and Plan:  Nonischemic cardiomyopathy/chronic HFrEF/status post  BiVi AICD  - EF 45%  - volume euvolemic; will try to dc daily Bumex; can use 0.5mg/d PRN for weight gain >3lbs in 1 day or >5lbs in 1 week  - cont low dose coreg and arb      HTN    - BP OK - low/nml  - change losartan to 12.5mg daily     F/u in 1 month or PRN     Marella Luis Manuelw, ANP

## 2020-02-11 ENCOUNTER — TELEPHONE (OUTPATIENT)
Dept: CARDIOLOGY CLINIC | Age: 85
End: 2020-02-11

## 2020-02-12 NOTE — TELEPHONE ENCOUNTER
[phone] Ms. Minus Genre' residence  [phone] Christina's cell    Spoke with pt's daughter, Melba Motta (HIPPA approved). She wanted to make sure she is notified of appts so that she and caretakers are updated as she has 24 hour care. Pt does not have short term memory. She does have dementia.

## 2020-02-17 ENCOUNTER — APPOINTMENT (OUTPATIENT)
Dept: GENERAL RADIOLOGY | Age: 85
DRG: 202 | End: 2020-02-17
Attending: EMERGENCY MEDICINE
Payer: MEDICARE

## 2020-02-17 ENCOUNTER — HOSPITAL ENCOUNTER (INPATIENT)
Age: 85
LOS: 1 days | Discharge: HOSPICE | DRG: 202 | End: 2020-02-19
Attending: EMERGENCY MEDICINE | Admitting: HOSPITALIST
Payer: MEDICARE

## 2020-02-17 DIAGNOSIS — D64.9 ANEMIA, UNSPECIFIED TYPE: ICD-10-CM

## 2020-02-17 DIAGNOSIS — I50.9 CONGESTIVE HEART FAILURE, UNSPECIFIED HF CHRONICITY, UNSPECIFIED HEART FAILURE TYPE (HCC): Primary | ICD-10-CM

## 2020-02-17 DIAGNOSIS — R41.3 MEMORY DEFICIT: ICD-10-CM

## 2020-02-17 DIAGNOSIS — N17.9 AKI (ACUTE KIDNEY INJURY) (HCC): ICD-10-CM

## 2020-02-17 PROBLEM — E87.1 HYPONATREMIA: Status: ACTIVE | Noted: 2020-02-17

## 2020-02-17 PROBLEM — R05.9 COUGH: Status: ACTIVE | Noted: 2020-02-17

## 2020-02-17 PROBLEM — J20.9 BRONCHITIS, ACUTE: Status: ACTIVE | Noted: 2020-02-17

## 2020-02-17 PROBLEM — J20.9 ACUTE BRONCHITIS: Status: ACTIVE | Noted: 2020-02-17

## 2020-02-17 PROBLEM — N18.9 ACUTE ON CHRONIC RENAL FAILURE (HCC): Status: ACTIVE | Noted: 2020-02-17

## 2020-02-17 PROBLEM — D53.9 MACROCYTIC ANEMIA: Status: ACTIVE | Noted: 2019-07-31

## 2020-02-17 LAB
ALBUMIN SERPL-MCNC: 3.3 G/DL (ref 3.5–5)
ALBUMIN/GLOB SERPL: 1.1 {RATIO} (ref 1.1–2.2)
ALP SERPL-CCNC: 50 U/L (ref 45–117)
ALT SERPL-CCNC: 25 U/L (ref 12–78)
ANION GAP SERPL CALC-SCNC: 8 MMOL/L (ref 5–15)
AST SERPL-CCNC: 24 U/L (ref 15–37)
BASOPHILS # BLD: 0 K/UL (ref 0–0.1)
BASOPHILS NFR BLD: 1 % (ref 0–1)
BILIRUB SERPL-MCNC: 1.1 MG/DL (ref 0.2–1)
BNP SERPL-MCNC: 4247 PG/ML
BUN SERPL-MCNC: 32 MG/DL (ref 6–20)
BUN/CREAT SERPL: 20 (ref 12–20)
CALCIUM SERPL-MCNC: 7.6 MG/DL (ref 8.5–10.1)
CHLORIDE SERPL-SCNC: 103 MMOL/L (ref 97–108)
CO2 SERPL-SCNC: 20 MMOL/L (ref 21–32)
COMMENT, HOLDF: NORMAL
CREAT SERPL-MCNC: 1.61 MG/DL (ref 0.55–1.02)
DEPRECATED S PYO AG THROAT QL EIA: NEGATIVE
DIFFERENTIAL METHOD BLD: ABNORMAL
EOSINOPHIL # BLD: 0.1 K/UL (ref 0–0.4)
EOSINOPHIL NFR BLD: 3 % (ref 0–7)
ERYTHROCYTE [DISTWIDTH] IN BLOOD BY AUTOMATED COUNT: 15.7 % (ref 11.5–14.5)
FLUAV AG NPH QL IA: NEGATIVE
FLUBV AG NOSE QL IA: NEGATIVE
GLOBULIN SER CALC-MCNC: 2.9 G/DL (ref 2–4)
GLUCOSE BLD STRIP.AUTO-MCNC: 207 MG/DL (ref 65–100)
GLUCOSE SERPL-MCNC: 153 MG/DL (ref 65–100)
HCT VFR BLD AUTO: 23.5 % (ref 35–47)
HGB BLD-MCNC: 7.9 G/DL (ref 11.5–16)
IMM GRANULOCYTES # BLD AUTO: 0 K/UL (ref 0–0.04)
IMM GRANULOCYTES NFR BLD AUTO: 1 % (ref 0–0.5)
INR PPP: 1.1 (ref 0.9–1.1)
LYMPHOCYTES # BLD: 0.9 K/UL (ref 0.8–3.5)
LYMPHOCYTES NFR BLD: 23 % (ref 12–49)
MCH RBC QN AUTO: 38.3 PG (ref 26–34)
MCHC RBC AUTO-ENTMCNC: 33.6 G/DL (ref 30–36.5)
MCV RBC AUTO: 114.1 FL (ref 80–99)
MONOCYTES # BLD: 0.4 K/UL (ref 0–1)
MONOCYTES NFR BLD: 10 % (ref 5–13)
NEUTS SEG # BLD: 2.3 K/UL (ref 1.8–8)
NEUTS SEG NFR BLD: 62 % (ref 32–75)
NRBC # BLD: 0 K/UL (ref 0–0.01)
NRBC BLD-RTO: 0 PER 100 WBC
PLATELET # BLD AUTO: 228 K/UL (ref 150–400)
PMV BLD AUTO: 12 FL (ref 8.9–12.9)
POTASSIUM SERPL-SCNC: 3.9 MMOL/L (ref 3.5–5.1)
PROT SERPL-MCNC: 6.2 G/DL (ref 6.4–8.2)
PROTHROMBIN TIME: 10.8 SEC (ref 9–11.1)
RBC # BLD AUTO: 2.06 M/UL (ref 3.8–5.2)
RBC MORPH BLD: ABNORMAL
SAMPLES BEING HELD,HOLD: NORMAL
SERVICE CMNT-IMP: ABNORMAL
SODIUM SERPL-SCNC: 131 MMOL/L (ref 136–145)
TROPONIN I SERPL-MCNC: <0.05 NG/ML
WBC # BLD AUTO: 3.7 K/UL (ref 3.6–11)

## 2020-02-17 PROCEDURE — 71045 X-RAY EXAM CHEST 1 VIEW: CPT

## 2020-02-17 PROCEDURE — 36415 COLL VENOUS BLD VENIPUNCTURE: CPT

## 2020-02-17 PROCEDURE — 82962 GLUCOSE BLOOD TEST: CPT

## 2020-02-17 PROCEDURE — 74011250637 HC RX REV CODE- 250/637: Performed by: INTERNAL MEDICINE

## 2020-02-17 PROCEDURE — 96374 THER/PROPH/DIAG INJ IV PUSH: CPT

## 2020-02-17 PROCEDURE — 99285 EMERGENCY DEPT VISIT HI MDM: CPT

## 2020-02-17 PROCEDURE — 99218 HC RM OBSERVATION: CPT

## 2020-02-17 PROCEDURE — 93005 ELECTROCARDIOGRAM TRACING: CPT

## 2020-02-17 PROCEDURE — 87804 INFLUENZA ASSAY W/OPTIC: CPT

## 2020-02-17 PROCEDURE — 83880 ASSAY OF NATRIURETIC PEPTIDE: CPT

## 2020-02-17 PROCEDURE — 87070 CULTURE OTHR SPECIMN AEROBIC: CPT

## 2020-02-17 PROCEDURE — 80053 COMPREHEN METABOLIC PANEL: CPT

## 2020-02-17 PROCEDURE — 87880 STREP A ASSAY W/OPTIC: CPT

## 2020-02-17 PROCEDURE — 85025 COMPLETE CBC W/AUTO DIFF WBC: CPT

## 2020-02-17 PROCEDURE — 84484 ASSAY OF TROPONIN QUANT: CPT

## 2020-02-17 PROCEDURE — 74011250636 HC RX REV CODE- 250/636: Performed by: INTERNAL MEDICINE

## 2020-02-17 PROCEDURE — 96372 THER/PROPH/DIAG INJ SC/IM: CPT

## 2020-02-17 PROCEDURE — 85610 PROTHROMBIN TIME: CPT

## 2020-02-17 PROCEDURE — 74011636637 HC RX REV CODE- 636/637: Performed by: INTERNAL MEDICINE

## 2020-02-17 PROCEDURE — 96375 TX/PRO/DX INJ NEW DRUG ADDON: CPT

## 2020-02-17 RX ORDER — LORAZEPAM 2 MG/ML
1 INJECTION INTRAMUSCULAR ONCE
Status: COMPLETED | OUTPATIENT
Start: 2020-02-18 | End: 2020-02-17

## 2020-02-17 RX ORDER — GUAIFENESIN 600 MG/1
600 TABLET, EXTENDED RELEASE ORAL 2 TIMES DAILY
Status: DISCONTINUED | OUTPATIENT
Start: 2020-02-17 | End: 2020-02-19 | Stop reason: HOSPADM

## 2020-02-17 RX ORDER — SODIUM CHLORIDE 0.9 % (FLUSH) 0.9 %
5-40 SYRINGE (ML) INJECTION EVERY 8 HOURS
Status: DISCONTINUED | OUTPATIENT
Start: 2020-02-17 | End: 2020-02-19 | Stop reason: HOSPADM

## 2020-02-17 RX ORDER — HEPARIN SODIUM 5000 [USP'U]/ML
5000 INJECTION, SOLUTION INTRAVENOUS; SUBCUTANEOUS EVERY 8 HOURS
Status: DISCONTINUED | OUTPATIENT
Start: 2020-02-17 | End: 2020-02-18

## 2020-02-17 RX ORDER — SODIUM CHLORIDE 9 MG/ML
75 INJECTION, SOLUTION INTRAVENOUS CONTINUOUS
Status: DISCONTINUED | OUTPATIENT
Start: 2020-02-17 | End: 2020-02-18

## 2020-02-17 RX ORDER — BENZONATATE 100 MG/1
100 CAPSULE ORAL
Status: DISCONTINUED | OUTPATIENT
Start: 2020-02-17 | End: 2020-02-19 | Stop reason: HOSPADM

## 2020-02-17 RX ORDER — DEXTROSE MONOHYDRATE 100 MG/ML
0-250 INJECTION, SOLUTION INTRAVENOUS AS NEEDED
Status: DISCONTINUED | OUTPATIENT
Start: 2020-02-17 | End: 2020-02-19 | Stop reason: HOSPADM

## 2020-02-17 RX ORDER — SODIUM CHLORIDE 0.9 % (FLUSH) 0.9 %
5-40 SYRINGE (ML) INJECTION AS NEEDED
Status: DISCONTINUED | OUTPATIENT
Start: 2020-02-17 | End: 2020-02-19 | Stop reason: HOSPADM

## 2020-02-17 RX ORDER — MAGNESIUM SULFATE 100 %
4 CRYSTALS MISCELLANEOUS AS NEEDED
Status: DISCONTINUED | OUTPATIENT
Start: 2020-02-17 | End: 2020-02-19 | Stop reason: HOSPADM

## 2020-02-17 RX ORDER — INSULIN LISPRO 100 [IU]/ML
INJECTION, SOLUTION INTRAVENOUS; SUBCUTANEOUS
Status: DISCONTINUED | OUTPATIENT
Start: 2020-02-17 | End: 2020-02-18

## 2020-02-17 RX ADMIN — LORAZEPAM 1 MG: 2 INJECTION INTRAMUSCULAR; INTRAVENOUS at 23:23

## 2020-02-17 RX ADMIN — GUAIFENESIN 600 MG: 600 TABLET, EXTENDED RELEASE ORAL at 19:03

## 2020-02-17 RX ADMIN — HEPARIN SODIUM 5000 UNITS: 5000 INJECTION INTRAVENOUS; SUBCUTANEOUS at 23:24

## 2020-02-17 RX ADMIN — BENZOCAINE AND MENTHOL, UNSPECIFIED FORM 1 LOZENGE: 15; 3.6 LOZENGE ORAL at 20:16

## 2020-02-17 RX ADMIN — INSULIN LISPRO 2 UNITS: 100 INJECTION, SOLUTION INTRAVENOUS; SUBCUTANEOUS at 23:25

## 2020-02-17 RX ADMIN — BENZONATATE 100 MG: 100 CAPSULE ORAL at 23:33

## 2020-02-17 RX ADMIN — Medication 10 ML: at 19:04

## 2020-02-17 RX ADMIN — SODIUM CHLORIDE 75 ML/HR: 900 INJECTION, SOLUTION INTRAVENOUS at 19:03

## 2020-02-17 RX ADMIN — Medication 10 ML: at 23:23

## 2020-02-17 NOTE — PROGRESS NOTES
[facility]: MED RECONCILIATION    Comments/Recommendations:     Patient able to confirm name, , allergies and preferred pharmacy  Patient claims to take half tab of losartan 25 mg tablet but Rx appears to be for 25 mg daily  Confirmed lantus dose with patient   All meds taken yesterday       Medications removed:    tums        Information obtained from: Patient    Allergies: Beta-blockers (beta-adrenergic blocking agts); Other medication; Phenobarbital; and Sulfa (sulfonamide antibiotics)    Prior to Admission Medications:     Prior to Admission Medications   Prescriptions Last Dose Informant Patient Reported? Taking? DOCOSAHEXANOIC ACID/EPA (FISH OIL PO) 2020 Care Giver Yes Yes   Sig: Take 1 Tab by mouth daily (after lunch). SITagliptin (JANUVIA) 50 mg tablet 2020 Care Giver Yes Yes   Sig: Take 50 mg by mouth daily. carvedilol (COREG) 6.25 mg tablet 2020 Care Giver No Yes   Sig: TAKE 1 TABLET TWICE A DAY WITH MEALS   co-enzyme Q-10 (CO Q-10) 50 mg capsule 2020 Care Giver Yes Yes   Sig: Take 50 mg by mouth daily (after lunch). insulin glargine (LANTUS U-100 INSULIN) 100 unit/mL injection 2020 Care Giver Yes Yes   Si Units by SubCUTAneous route Daily (before lunch). levothyroxine (SYNTHROID) 50 mcg tablet 2020 Care Giver Yes Yes   Sig: Take 50 mcg by mouth Daily (before breakfast). losartan (COZAAR) 25 mg tablet 2020 Care Giver No Yes   Sig: Take 0.5 Tabs by mouth daily. melatonin 10 mg cap 2020 Care Giver Yes Yes   Sig: Take 10 mg by mouth nightly. sertraline (ZOLOFT) 100 mg tablet 2020 Care Giver Yes Yes   Sig: Take 100 mg by mouth daily. Facility-Administered Medications: None           [employer]    Clinical Staff Pharmacist  [address] [place] [address]  [phone]

## 2020-02-17 NOTE — ED PROVIDER NOTES
Please note that this dictation was completed with [employer], the computer voice recognition software.  Quite often unanticipated grammatical, syntax, homophones, and other interpretive errors are inadvertently transcribed by the computer software.  Please disregard these errors.  Please excuse any errors that have escaped final proofreading. 22-year-old white female past medical history arthritis, breast cancer, cerebral microvascular disease, dementia, diabetes, dyslipidemia, GERD, hypertension, hard of hearing, hypothyroid, left bundle branch block on EKG, melanoma, nonischemic cardiomyopathy, mitral regurg, sleep apnea and wears O2 at night cannot tolerate CPAP, and systolic CHF presents by EMS complaining of \"started with a sore throat lost my voice and have been coughing since yesterday. Patient states she had trouble sleeping last night due to the cough, was able to eat breakfast normally this morning has not taken anything for the cough. She denies urinary complaints fevers chills vomiting diarrhea earaches vision changes numbness or tingling. States the cough is associated with chest pressure which is midsternal nonradiating and now constant. He had that the cough is nonproductive. pt denies HA, vison changes, diff swallowing,  Abd pain, F/Ch, N/V, D/Cons or other current systemic complaints    Patient states she has a pacemaker/defibrillator, sees Dr. Kristal Johnson with cardiology;      Social/ PSH reviewed in EMR    EMR Chart Reviewed               Past Medical History:   Diagnosis Date    Arthritis     Breast cancer ([facility]     Cerebral microvascular disease     Dementia ([address]     Diabetes ([address]     Dyslipidemia     GERD (gastroesophageal reflux disease)     Hearing loss     Hypertension     Hypothyroid     LBBB (left bundle branch block)     Melanoma ([facility])     Non-ischemic cardiomyopathy ([address]     Cath 2/16/15 - Mild (non-obstructive) CAD,LVEF 30%, 2+ MR    Skin cancer     melanoma on legs    Sleep apnea     wears O2 at night- cannot tolerate CPAP    Systolic CHF ([facility]     Admission 2/13/16       Past Surgical History:   Procedure Laterality Date    HX BREAST LUMPECTOMY  2003    right    HX CATARACT REMOVAL  1994    bilateral    HX CHOLECYSTECTOMY      HX DILATION AND CURETTAGE  1960    HX HEART CATHETERIZATION      x2- no blockages    HX HEENT  2004    surgery on upper jaw    HX HYSTERECTOMY  1967    HX HYSTEROSCOPY      HX OOPHORECTOMY  1967    left    HX ORTHOPAEDIC  2010    right knee replacement    HX ORTHOPAEDIC      repair torn meniscus left knee    HX ORTHOPAEDIC  1996    repair knuckle on left pinky finger    HX OTHER SURGICAL  2000    remove benign tumore right side between rib and right lung    HX PACEMAKER      HX PARTIAL THYROIDECTOMY      HX UROLOGICAL  2003    bladder surgery x2         Family History:   Problem Relation Age of Onset    Stroke Mother        Social History     Socioeconomic History    Marital status:      Spouse name: Not on file    Number of children: Not on file    Years of education: Not on file    Highest education level: Not on file   Occupational History    Not on file   Social Needs    Financial resource strain: Not on file    Food insecurity:     Worry: Not on file     Inability: Not on file    Transportation needs:     Medical: Not on file     Non-medical: Not on file   Tobacco Use    Smoking status: Never Smoker    Smokeless tobacco: Never Used   Substance and Sexual Activity    Alcohol use:  Yes     Alcohol/week: 4.0 standard drinks     Types: 3 Glasses of wine, 1 Shots of liquor per week    Drug use: No    Sexual activity: Never   Lifestyle    Physical activity:     Days per week: Not on file     Minutes per session: Not on file    Stress: Not on file   Relationships    Social connections:     Talks on phone: Not on file     Gets together: Not on file     Attends Mandaeism service: Not on file     Active member of club or organization: Not on file     Attends meetings of clubs or organizations: Not on file     Relationship status: Not on file    Intimate partner violence:     Fear of current or ex partner: Not on file     Emotionally abused: Not on file     Physically abused: Not on file     Forced sexual activity: Not on file   Other Topics Concern    Not on file   Social History Narrative    Not on file         ALLERGIES: Beta-blockers (beta-adrenergic blocking agts); Other medication; Phenobarbital; and Sulfa (sulfonamide antibiotics)    Review of Systems   Constitutional: Positive for chills. Negative for appetite change and fever. HENT: Positive for sore throat and voice change. Negative for drooling, ear pain, mouth sores, rhinorrhea and trouble swallowing. Eyes: Negative for visual disturbance. Respiratory: Positive for cough, chest tightness and shortness of breath. Negative for choking, wheezing and stridor. Cardiovascular: Positive for chest pain. Negative for palpitations and leg swelling. Gastrointestinal: Negative for abdominal pain, constipation, diarrhea, nausea and vomiting. Genitourinary: Negative for dysuria. Musculoskeletal: Negative for back pain. Skin: Negative for rash. Neurological: Negative for facial asymmetry, speech difficulty and light-headedness. All other systems reviewed and are negative. Vitals:    02/17/20 1325   BP: (!) 154/91   Pulse: 84   Resp: 22   Temp: 98.6 °F (37 °C)   SpO2: 95%   Weight: 78 kg (172 lb)   Height: 5' 5\" (1.651 m)            Physical Exam  Vitals signs and nursing note reviewed. Constitutional:       General: She is not in acute distress. Appearance: Normal appearance. She is well-developed. She is obese. She is not ill-appearing, toxic-appearing or diaphoretic. Comments: NAD, AxOx4, speaking in complete sentences with a hoarse voice     HENT:      Head: Normocephalic and atraumatic.       Comments: Cn intact       Right Ear: External ear normal.      Left Ear: External ear normal.      Nose: Nose normal.      Mouth/Throat:      Mouth: Mucous membranes are moist.      Pharynx: Posterior oropharyngeal erythema present. No oropharyngeal exudate. Eyes:      General: No scleral icterus. Right eye: No discharge. Left eye: No discharge. Extraocular Movements: Extraocular movements intact. Conjunctiva/sclera: Conjunctivae normal.      Pupils: Pupils are equal, round, and reactive to light. Neck:      Musculoskeletal: Normal range of motion and neck supple. No neck rigidity. Vascular: No JVD. Trachea: No tracheal deviation. Cardiovascular:      Rate and Rhythm: Normal rate and regular rhythm. Pulses: Normal pulses. Heart sounds: Normal heart sounds. No murmur. No friction rub. No gallop. Pulmonary:      Effort: Pulmonary effort is normal. No bradypnea, accessory muscle usage or respiratory distress. Breath sounds: Examination of the right-lower field reveals wheezing. Examination of the left-lower field reveals wheezing. Wheezing present. No decreased breath sounds, rhonchi or rales. Chest:      Chest wall: No mass, tenderness or crepitus. Comments: Palpable pacer/ nttp  Abdominal:      General: Bowel sounds are normal.      Palpations: Abdomen is soft. Tenderness: There is no abdominal tenderness. There is no guarding or rebound. Genitourinary:     Vagina: No vaginal discharge. Comments: Pt denies urinary/ vaginal complaints  Musculoskeletal: Normal range of motion. General: No swelling, tenderness, deformity or signs of injury. Right lower leg: No edema. Left lower leg: No edema. Lymphadenopathy:      Cervical: No cervical adenopathy. Skin:     General: Skin is warm and dry. Capillary Refill: Capillary refill takes less than 2 seconds. Coloration: Skin is not jaundiced or pale. Findings: No bruising, erythema, lesion or rash.    Neurological: General: No focal deficit present. Mental Status: She is alert and oriented to person, place, and time. Mental status is at baseline. Cranial Nerves: No cranial nerve deficit. Motor: No weakness or abnormal muscle tone. Coordination: Coordination normal.      Gait: Gait normal.      Comments: pt has motor/ CV/ Sensation grossly intact to all extremities, R = L in strength;   Psychiatric:         Behavior: Behavior normal.         Thought Content: Thought content normal.          MDM       Procedures    Chief Complaint   Patient presents with    Cough    Shortness of Breath       1:39 PM  The patients presenting problems have been discussed, and they are in agreement with the care plan formulated and outlined with them. I have encouraged them to ask questions as they arise throughout their visit. MEDICATIONS GIVEN:  Medications - No data to display    LABS REVIEWED:  Labs Reviewed   STREP AG SCREEN, GROUP A   TROPONIN I   URINALYSIS W/ REFLEX CULTURE   METABOLIC PANEL, COMPREHENSIVE   CBC WITH AUTOMATED DIFF   PROTHROMBIN TIME + INR   NT-PRO BNP   INFLUENZA A+B VIRAL AGS       RADIOLOGY RESULTS:  The following have been ordered and reviewed:  _____________________________________________________________________  _____________________________________________________________________    EKG interpretation:   Rhythm: paced rhythm; and regular . Rate (approx.): 84; Axis: normal; P wave: normal; QRS interval: normal ; ST/T wave: normal; Negative acute significant segmental elevations/ unchanged compared to study dated 12/26/2019    PROCEDURES:        CONSULTATIONS:       PROGRESS NOTES:      DIAGNOSIS:    1. Congestive heart failure, unspecified HF chronicity, unspecified heart failure type ([facility]    2. JENN (acute kidney injury) ([facility]    3. Anemia, unspecified type    4.  Memory deficit        PLAN:  1-admit/ monitor;       ED COURSE: The patients hospital course has been uncomplicated. Hospitalist Perfect Serve for Admission  3:45 PM    ED Room Number: ER02/02  Patient Name and age:  Govind Haynes 80 y.o.  female  Working Diagnosis: No diagnosis found.   Readmission: no  Isolation Requirements:  no  Recommended Level of Care:  telemetry  Code Status:  Full Code  Department:[facility] ER  Other:  CHF/ EJNN/ anemia / coughing

## 2020-02-17 NOTE — ED NOTES
TRANSFER - OUT REPORT:    Verbal report given to Katerina(name) on Brennen Novak  being transferred to Med/Surg(unit) for routine progression of care       Report consisted of patients Situation, Background, Assessment and   Recommendations(SBAR). Information from the following report(s) SBAR, Kardex, ED Summary, [facility] and Recent Results was reviewed with the receiving nurse. Lines:   Peripheral IV 02/17/20 Left;Posterior Wrist (Active)   Site Assessment Clean, dry, & intact 2/17/2020  1:52 PM   Phlebitis Assessment 0 2/17/2020  1:52 PM   Infiltration Assessment 0 2/17/2020  1:52 PM   Dressing Status Clean, dry, & intact 2/17/2020  1:52 PM   Hub Color/Line Status Pink 2/17/2020  1:52 PM        Opportunity for questions and clarification was provided.       Patient transported with:   Tricia Bertrand RN

## 2020-02-17 NOTE — PROGRESS NOTES
2/17/2020  5:16 PM  Case Management note    Reason for Admission:   Renal failure    OBS educated, letter signed and placed in chart  Patient has 24/7 care, though no one at bedside currently. Nurse indicated there was a caregiver earlier. I left message for her ICE locally, which is a friend who is nurse. Shirley Castellon [phone], cell [phone]. I called one of her daughters, who let me know patient has the 24/7 care. They are aware of patient debility and state of disease  Patient uses a walker and cane  Patient has history of DM, HTN, dementia, has defibrillator   Patient came to hospital for cough and SOB x 1 day                 RUR Score:     51             Resources/supports as identified by patient/family:   Has 24 hr care/ friend who is nurse                Top Challenges facing patient (as identified by patient/family and CM): Finances/Medication cost?    Medicare/    [employer]?  friends              Support system or lack thereof? 24/7 care                     Living arrangements? Lives in independent living with 24/7 caregivers           Self-care/ADLs/Cognition? Limited self care/ poor health cognition          Current Advanced Directive/Advance Care Plan:  DNR on file                          Plan for utilizing home health:    24/7 caregiver/ most likely baseline                 Transition of Care Plan:                1. Home with continued 24/7 plan  2. PCP follow up  3. Cm to follow through discharge    Care Management Interventions  PCP Verified by CM: Yes  Mode of Transport at Discharge:  Other (see comment)  Transition of Care Consult (CM Consult): Discharge Planning  Current Support Network: Lives Alone, Has Personal Caregivers  Confirm Follow Up Transport: Other (see comment)  The Plan for Transition of Care is Related to the Following Treatment Goals : renal failure  Discharge Location  Discharge Placement: Home with family assistance  Isaias Maza, [address]

## 2020-02-17 NOTE — H&P
[address]  [phone]    Admission History and Physical      NAME:  Lily Morelos   :   1928   MRN:  [id number]     PCP:  Mohamud Ramirez MD     Date of service:  2020         Subjective:     CHIEF COMPLAINT: Cough    HISTORY OF PRESENT ILLNESS:     Ms. Han Briceño is a 80 y.o.  female who is admitted with cough. Ms. Han Briceño with past medical history of diabetes mellitus, hyperlipidemia, GERD, hypothyroidism, hypertension, nonischemic cardiomyopathy, sleep apnea, presented to ER complaining of cough which started yesterday night. The cough has been constant and nonproductive. The cough is nagging associated with sore throat. Denies shortness of breath. Denies sick contact. No fever.     Past Medical History:   Diagnosis Date    Arthritis     Breast cancer ([address]     Cerebral microvascular disease     Dementia ([address]     Diabetes ([address]     Dyslipidemia     GERD (gastroesophageal reflux disease)     Hearing loss     Hypertension     Hypothyroid     LBBB (left bundle branch block)     Melanoma (HCC)     Non-ischemic cardiomyopathy ([address]     Cath 2/16/15 - Mild (non-obstructive) CAD,LVEF 30%, 2+ MR    Skin cancer     melanoma on legs    Sleep apnea     wears O2 at night- cannot tolerate CPAP    Systolic CHF ([address]     Admission 16        Past Surgical History:   Procedure Laterality Date    HX BREAST LUMPECTOMY  2003    right    HX CATARACT REMOVAL      bilateral    HX CHOLECYSTECTOMY      HX DILATION AND CURETTAGE  1960    HX HEART CATHETERIZATION      x2- no blockages    HX HEENT      surgery on upper jaw    HX HYSTERECTOMY  1967    HX HYSTEROSCOPY      HX OOPHORECTOMY  1967    left    HX ORTHOPAEDIC  2010    right knee replacement    HX ORTHOPAEDIC      repair torn meniscus left knee    HX ORTHOPAEDIC      repair knuckle on left pinky finger    HX OTHER SURGICAL  2000    remove benign tumore right side between rib and right lung    HX PACEMAKER      HX PARTIAL THYROIDECTOMY      HX UROLOGICAL  2003    bladder surgery x2       Social History     Tobacco Use    Smoking status: Never Smoker    Smokeless tobacco: Never Used   Substance Use Topics    Alcohol use: Yes     Alcohol/week: 4.0 standard drinks     Types: 3 Glasses of wine, 1 Shots of liquor per week        Family History   Problem Relation Age of Onset    Stroke Mother         Allergies   Allergen Reactions    Beta-Blockers (Beta-Adrenergic Blocking Agts) Unknown (comments)     Made her feel tired, breakout on her back    Other Medication Unknown (comments)     Bromides    Phenobarbital Unknown (comments)    Sulfa (Sulfonamide Antibiotics) Swelling     rash        Prior to Admission medications    Medication Sig Start Date End Date Taking? Authorizing Provider   losartan (COZAAR) 25 mg tablet Take 0.5 Tabs by mouth daily. 1/23/20   Janes Jackson NP   levothyroxine (SYNTHROID) 50 mcg tablet Take 50 mcg by mouth Daily (before breakfast). Provider, Historical   insulin glargine (LANTUS U-100 INSULIN) 100 unit/mL injection 22 Units by SubCUTAneous route Daily (before lunch). Provider, Historical   SITagliptin (JANUVIA) 50 mg tablet Take 50 mg by mouth daily. Provider, Historical   calcium carbonate (TUMS) 200 mg calcium (500 mg) chew Take 1 Tab by mouth three (3) times daily as needed (GERD). Provider, Historical   carvedilol (COREG) 6.25 mg tablet TAKE 1 TABLET TWICE A DAY WITH MEALS 4/26/19   Ivelisse Rodriges MD   co-enzyme Q-10 (CO Q-10) 50 mg capsule Take 50 mg by mouth daily (after lunch). Provider, Historical   sertraline (ZOLOFT) 100 mg tablet Take 100 mg by mouth daily. Christian Dang MD   melatonin 10 mg cap Take 10 mg by mouth nightly. Christian Dang MD   DOCOSAHEXANOIC ACID/EPA (FISH OIL PO) Take 1 Tab by mouth daily (after lunch).     Christian Dang MD         Review of Systems:  (bold if positive, if negative)    Gen:  Eyes:  ENT:  CVS:  Pulm:  Cough,GI:  :  MS:  Skin:  Psych:  Endo:  Hem:  Renal:  Neuro:            Objective:      VITALS:    Vital signs reviewed; most recent are:    Visit Vitals  /77   Pulse 84   Temp 98.6 °F (37 °C)   Resp (!) 31   Ht 5' 5\" (1.651 m)   Wt 78 kg (172 lb)   SpO2 93%   BMI 28.62 kg/m²     SpO2 Readings from Last 6 Encounters:   02/17/20 93%   01/23/20 95%   12/27/19 93%   12/19/19 100%   08/03/19 96%   06/06/19 97%        No intake or output data in the 24 hours ending 02/17/20 1613         Exam:     Physical Exam:    Gen:  Well-developed, well-nourished, in no acute distress  HEENT:  Pink conjunctivae, PERRL, hearing intact to voice, moist mucous membranes  Neck:  Supple, without masses, thyroid non-tender  Resp:  No accessory muscle use, clear breath sounds without wheezes rales or rhonchi  Card:  No murmurs, normal S1, S2 without thrills, bruits or peripheral edema  Abd:  Soft, non-tender, non-distended, normoactive bowel sounds are present, no palpable organomegaly and no detectable hernias  Lymph:  No cervical or inguinal adenopathy  Musc:  No cyanosis or clubbing  Skin:  No rashes or ulcers, skin turgor is good  Neuro:  Cranial nerves are grossly intact, no focal motor weakness, follows commands appropriately  Psych:  Good insight, oriented to person, place and time, alert       Labs:    Recent Labs     02/17/20  1352   WBC 3.7   HGB 7.9*   HCT 23.5*        Recent Labs     02/17/20  1352   *   K 3.9      CO2 20*   *   BUN 32*   CREA 1.61*   CA 7.6*   ALB 3.3*   TBILI 1.1*   SGOT 24   ALT 25     Lab Results   Component Value Date/Time    Glucose (POC) 209 (H) 12/27/2019 11:08 AM    Glucose (POC) 110 (H) 12/27/2019 07:01 AM     No results for input(s): PH, PCO2, PO2, HCO3, FIO2 in the last 72 hours. Recent Labs     02/17/20  1342   INR 1.1       Telemetry reviewed:   Paced       Assessment/Plan:    1.   Acute bronchitis (2/17/2020)/ Cough (2/17/2020). Her chest x-ray is normal and physical examination is unremarkable. Will admit under observation. Check RVP. Start doxycycline, Mucinex and monitor clinically. 2.  Acute on chronic renal failure (HCC) (2/17/2020)/ Hyponatremia (2/17/2020). Patient looks volume depleted. Avoid nephrotoxic drugs and start gentle IV fluids for 24 hours and reassess. (Patient had history of CHF. Watch volume status). 3.  HFrEF (heart failure with reduced ejection fraction) / Hypertension / LBBB (left bundle branch block) / Non-ischemic cardiomyopathy / Biventricular automatic implantable cardioverter defibrillator in situ -stable, recent EF 45%. Resume home resume coreg, atorvastatin. Hold losartan.     4.  DM type 2 (diabetes mellitus, type 2) with nephropathy - Diabetic diet and counseling.  SSI per protocol.  Continue home Lantus.       5. Sleep apnea - continue O2 at night, does not tolerate CPAP due to anxiety     6. Macrocytic anemia -this is chronic. Check iron studies.     7.  Hypothyroid -recent TSH WNL. Continue levothyroxine.       8. GERD (gastroesophageal reflux disease).   Pepcid if needed     CODE STATUS: DNR(has ACP in chart)    Previous medical records reviewed     Risk of deterioration: medium      Total time spent with patient: 48 [address] [facility] discussed with: Patient, Nursing Staff and >50% of time spent in counseling and coordination of care    Discussed:  Care Plan    Prophylaxis:  Hep SQ    Probable Disposition:  Home w/Family           ___________________________________________________    Attending Physician: Marie Tang MD

## 2020-02-18 ENCOUNTER — HOSPICE ADMISSION (OUTPATIENT)
Dept: HOSPICE | Facility: HOSPICE | Age: 85
End: 2020-02-18

## 2020-02-18 PROBLEM — J21.9 ACUTE BRONCHIOLITIS: Status: ACTIVE | Noted: 2020-02-18

## 2020-02-18 LAB
ATRIAL RATE: 84 BPM
B PERT DNA SPEC QL NAA+PROBE: NOT DETECTED
BORDETELLA PARAPERTUSSIS PCR, BORPAR: NOT DETECTED
C PNEUM DNA SPEC QL NAA+PROBE: NOT DETECTED
CALCULATED P AXIS, ECG09: 77 DEGREES
CALCULATED R AXIS, ECG10: 148 DEGREES
CALCULATED T AXIS, ECG11: 23 DEGREES
DIAGNOSIS, 93000: NORMAL
FLUAV H1 2009 PAND RNA SPEC QL NAA+PROBE: NOT DETECTED
FLUAV H1 RNA SPEC QL NAA+PROBE: NOT DETECTED
FLUAV H3 RNA SPEC QL NAA+PROBE: NOT DETECTED
FLUAV SUBTYP SPEC NAA+PROBE: NOT DETECTED
FLUBV RNA SPEC QL NAA+PROBE: NOT DETECTED
GLUCOSE BLD STRIP.AUTO-MCNC: 268 MG/DL (ref 65–100)
GLUCOSE BLD STRIP.AUTO-MCNC: 297 MG/DL (ref 65–100)
HADV DNA SPEC QL NAA+PROBE: NOT DETECTED
HCOV 229E RNA SPEC QL NAA+PROBE: NOT DETECTED
HCOV HKU1 RNA SPEC QL NAA+PROBE: NOT DETECTED
HCOV NL63 RNA SPEC QL NAA+PROBE: NOT DETECTED
HCOV OC43 RNA SPEC QL NAA+PROBE: NOT DETECTED
HMPV RNA SPEC QL NAA+PROBE: DETECTED
HPIV1 RNA SPEC QL NAA+PROBE: NOT DETECTED
HPIV2 RNA SPEC QL NAA+PROBE: NOT DETECTED
HPIV3 RNA SPEC QL NAA+PROBE: NOT DETECTED
HPIV4 RNA SPEC QL NAA+PROBE: NOT DETECTED
M PNEUMO DNA SPEC QL NAA+PROBE: NOT DETECTED
Q-T INTERVAL, ECG07: 454 MS
QRS DURATION, ECG06: 142 MS
QTC CALCULATION (BEZET), ECG08: 536 MS
RSV RNA SPEC QL NAA+PROBE: NOT DETECTED
RV+EV RNA SPEC QL NAA+PROBE: NOT DETECTED
SERVICE CMNT-IMP: ABNORMAL
SERVICE CMNT-IMP: ABNORMAL
VENTRICULAR RATE, ECG03: 84 BPM

## 2020-02-18 PROCEDURE — 74011250636 HC RX REV CODE- 250/636: Performed by: HOSPITALIST

## 2020-02-18 PROCEDURE — 77030005513 HC CATH URETH FOL11 MDII -B

## 2020-02-18 PROCEDURE — 74011250636 HC RX REV CODE- 250/636: Performed by: INTERNAL MEDICINE

## 2020-02-18 PROCEDURE — 96376 TX/PRO/DX INJ SAME DRUG ADON: CPT

## 2020-02-18 PROCEDURE — 99218 HC RM OBSERVATION: CPT

## 2020-02-18 PROCEDURE — 96375 TX/PRO/DX INJ NEW DRUG ADDON: CPT

## 2020-02-18 PROCEDURE — 77030029684 HC NEB SM VOL KT MONA -A

## 2020-02-18 PROCEDURE — 94640 AIRWAY INHALATION TREATMENT: CPT

## 2020-02-18 PROCEDURE — 0100U RESPIRATORY PANEL,PCR,NASOPHARYNGEAL: CPT

## 2020-02-18 PROCEDURE — 77030019905 HC CATH URETH INTMIT MDII -A

## 2020-02-18 PROCEDURE — 74011000250 HC RX REV CODE- 250: Performed by: INTERNAL MEDICINE

## 2020-02-18 PROCEDURE — 74011250637 HC RX REV CODE- 250/637: Performed by: INTERNAL MEDICINE

## 2020-02-18 PROCEDURE — 74011636637 HC RX REV CODE- 636/637: Performed by: HOSPITALIST

## 2020-02-18 PROCEDURE — 77030040831 HC BAG URINE DRNG MDII -A

## 2020-02-18 PROCEDURE — 94760 N-INVAS EAR/PLS OXIMETRY 1: CPT

## 2020-02-18 PROCEDURE — 65270000029 HC RM PRIVATE

## 2020-02-18 PROCEDURE — 82962 GLUCOSE BLOOD TEST: CPT

## 2020-02-18 RX ORDER — MORPHINE SULFATE 2 MG/ML
1 INJECTION, SOLUTION INTRAMUSCULAR; INTRAVENOUS ONCE
Status: DISCONTINUED | OUTPATIENT
Start: 2020-02-18 | End: 2020-02-18

## 2020-02-18 RX ORDER — HEPARIN SODIUM 5000 [USP'U]/ML
5000 INJECTION, SOLUTION INTRAVENOUS; SUBCUTANEOUS EVERY 8 HOURS
Status: DISCONTINUED | OUTPATIENT
Start: 2020-02-18 | End: 2020-02-19 | Stop reason: HOSPADM

## 2020-02-18 RX ORDER — CARVEDILOL 6.25 MG/1
6.25 TABLET ORAL 2 TIMES DAILY WITH MEALS
Status: DISCONTINUED | OUTPATIENT
Start: 2020-02-18 | End: 2020-02-19 | Stop reason: HOSPADM

## 2020-02-18 RX ORDER — OLANZAPINE 5 MG/1
10 TABLET, ORALLY DISINTEGRATING ORAL ONCE
Status: DISCONTINUED | OUTPATIENT
Start: 2020-02-18 | End: 2020-02-18

## 2020-02-18 RX ORDER — IPRATROPIUM BROMIDE AND ALBUTEROL SULFATE 2.5; .5 MG/3ML; MG/3ML
3 SOLUTION RESPIRATORY (INHALATION)
Status: DISCONTINUED | OUTPATIENT
Start: 2020-02-18 | End: 2020-02-18

## 2020-02-18 RX ORDER — SERTRALINE HYDROCHLORIDE 50 MG/1
100 TABLET, FILM COATED ORAL DAILY
Status: DISCONTINUED | OUTPATIENT
Start: 2020-02-19 | End: 2020-02-19 | Stop reason: HOSPADM

## 2020-02-18 RX ORDER — LANOLIN ALCOHOL/MO/W.PET/CERES
10 CREAM (GRAM) TOPICAL
Status: DISCONTINUED | OUTPATIENT
Start: 2020-02-18 | End: 2020-02-19 | Stop reason: HOSPADM

## 2020-02-18 RX ORDER — INSULIN LISPRO 100 [IU]/ML
INJECTION, SOLUTION INTRAVENOUS; SUBCUTANEOUS
Status: DISCONTINUED | OUTPATIENT
Start: 2020-02-18 | End: 2020-02-19 | Stop reason: HOSPADM

## 2020-02-18 RX ORDER — MORPHINE SULFATE 2 MG/ML
2 INJECTION, SOLUTION INTRAMUSCULAR; INTRAVENOUS
Status: DISCONTINUED | OUTPATIENT
Start: 2020-02-18 | End: 2020-02-18

## 2020-02-18 RX ORDER — INSULIN GLARGINE 100 [IU]/ML
22 INJECTION, SOLUTION SUBCUTANEOUS
Status: DISCONTINUED | OUTPATIENT
Start: 2020-02-19 | End: 2020-02-19 | Stop reason: HOSPADM

## 2020-02-18 RX ORDER — SCOLOPAMINE TRANSDERMAL SYSTEM 1 MG/1
1 PATCH, EXTENDED RELEASE TRANSDERMAL
Status: DISCONTINUED | OUTPATIENT
Start: 2020-02-18 | End: 2020-02-18

## 2020-02-18 RX ORDER — ONDANSETRON 2 MG/ML
4 INJECTION INTRAMUSCULAR; INTRAVENOUS
Status: DISCONTINUED | OUTPATIENT
Start: 2020-02-18 | End: 2020-02-19 | Stop reason: HOSPADM

## 2020-02-18 RX ORDER — LEVOTHYROXINE SODIUM 50 UG/1
50 TABLET ORAL
Status: DISCONTINUED | OUTPATIENT
Start: 2020-02-19 | End: 2020-02-19 | Stop reason: HOSPADM

## 2020-02-18 RX ORDER — ACETAMINOPHEN 325 MG/1
650 TABLET ORAL
Status: DISCONTINUED | OUTPATIENT
Start: 2020-02-18 | End: 2020-02-19 | Stop reason: HOSPADM

## 2020-02-18 RX ORDER — ALOGLIPTIN 6.25 MG/1
6.25 TABLET, FILM COATED ORAL DAILY
Status: DISCONTINUED | OUTPATIENT
Start: 2020-02-19 | End: 2020-02-19 | Stop reason: HOSPADM

## 2020-02-18 RX ORDER — LORAZEPAM 2 MG/ML
2 INJECTION INTRAMUSCULAR
Status: DISCONTINUED | OUTPATIENT
Start: 2020-02-18 | End: 2020-02-18

## 2020-02-18 RX ORDER — IPRATROPIUM BROMIDE AND ALBUTEROL SULFATE 2.5; .5 MG/3ML; MG/3ML
3 SOLUTION RESPIRATORY (INHALATION)
Status: DISCONTINUED | OUTPATIENT
Start: 2020-02-18 | End: 2020-02-19 | Stop reason: HOSPADM

## 2020-02-18 RX ORDER — BUMETANIDE 0.25 MG/ML
1 INJECTION INTRAMUSCULAR; INTRAVENOUS ONCE
Status: COMPLETED | OUTPATIENT
Start: 2020-02-18 | End: 2020-02-18

## 2020-02-18 RX ADMIN — Medication 10 ML: at 22:19

## 2020-02-18 RX ADMIN — BENZONATATE 100 MG: 100 CAPSULE ORAL at 22:49

## 2020-02-18 RX ADMIN — MORPHINE SULFATE 2 MG: 2 INJECTION, SOLUTION INTRAMUSCULAR; INTRAVENOUS at 02:14

## 2020-02-18 RX ADMIN — LORAZEPAM 2 MG: 2 INJECTION INTRAMUSCULAR; INTRAVENOUS at 12:06

## 2020-02-18 RX ADMIN — IPRATROPIUM BROMIDE AND ALBUTEROL SULFATE 3 ML: .5; 3 SOLUTION RESPIRATORY (INHALATION) at 00:40

## 2020-02-18 RX ADMIN — HEPARIN SODIUM 5000 UNITS: 5000 INJECTION INTRAVENOUS; SUBCUTANEOUS at 15:25

## 2020-02-18 RX ADMIN — HEPARIN SODIUM 5000 UNITS: 5000 INJECTION INTRAVENOUS; SUBCUTANEOUS at 22:17

## 2020-02-18 RX ADMIN — CARVEDILOL 6.25 MG: 6.25 TABLET, FILM COATED ORAL at 17:00

## 2020-02-18 RX ADMIN — MORPHINE SULFATE 2 MG: 2 INJECTION, SOLUTION INTRAMUSCULAR; INTRAVENOUS at 06:48

## 2020-02-18 RX ADMIN — METHYLPREDNISOLONE SODIUM SUCCINATE 60 MG: 40 INJECTION, POWDER, FOR SOLUTION INTRAMUSCULAR; INTRAVENOUS at 02:14

## 2020-02-18 RX ADMIN — IPRATROPIUM BROMIDE AND ALBUTEROL SULFATE 3 ML: .5; 3 SOLUTION RESPIRATORY (INHALATION) at 11:55

## 2020-02-18 RX ADMIN — GUAIFENESIN 600 MG: 600 TABLET, EXTENDED RELEASE ORAL at 06:00

## 2020-02-18 RX ADMIN — MORPHINE SULFATE 2 MG: 2 INJECTION, SOLUTION INTRAMUSCULAR; INTRAVENOUS at 11:38

## 2020-02-18 RX ADMIN — BUMETANIDE 1 MG: 0.25 INJECTION INTRAMUSCULAR; INTRAVENOUS at 02:14

## 2020-02-18 RX ADMIN — MELATONIN TAB 3 MG 10.5 MG: 3 TAB at 22:17

## 2020-02-18 RX ADMIN — Medication 10 ML: at 15:26

## 2020-02-18 RX ADMIN — GUAIFENESIN 600 MG: 600 TABLET, EXTENDED RELEASE ORAL at 18:00

## 2020-02-18 RX ADMIN — INSULIN LISPRO 5 UNITS: 100 INJECTION, SOLUTION INTRAVENOUS; SUBCUTANEOUS at 17:02

## 2020-02-18 RX ADMIN — INSULIN LISPRO 3 UNITS: 100 INJECTION, SOLUTION INTRAVENOUS; SUBCUTANEOUS at 22:18

## 2020-02-18 NOTE — PROGRESS NOTES
Was asked by nursing to see patient due to worsening dyspnea. She was seen and examined by me. RR~35, lungs with bilateral crackles at bases. A/P: acute resp failure likely due to acute on chronic systolic CHF. Spoke to daughter at bedside and confirmed DNR, no bipap. Will stop IVF and give 1 dose of IV bumex. Cont O2, nebs prn. Poor prognosis, high risks for decompensation.   Will cont to monitor closely

## 2020-02-18 NOTE — ACP (ADVANCE CARE PLANNING)
Advance Care Planning (ACP) Provider Note - Comprehensive     Date of ACP Conversation: 02/18/20   Diagnosis: acute on chronic systolic CHF  Persons included in Conversation:  patient and family  Length of ACP Conversation in minutes:  16 minutes    Authorized Decision Maker (if patient is incapable of making informed decisions): This person is:  Daughter Rafi Wall for ALL Patients with Decision Making Capacity:   Exploration of values, goals, and preferences if recovery is not expected, even with continued medical treatment in the event of: Imminent death    Review of Existing Advance Directive:  Has advance directive, durable DNR. goal of care is symptoms control, then home with care taker. Daughter interested in hospice if patient deteriorates     For Serious or Chronic Illness:  Understanding of medical condition      Interventions Provided:  Recommended communicating the plan and making copies for the healthcare agent, personal physician, and others as appropriate (e.g., health system)       -------------------------Addendum---------------    Spoke with daughter at bedside. She is agreeable to comfort measures only. Will start IV morphine, IV ativan prn.   Consult palliative care/hospice in AM

## 2020-02-18 NOTE — PROGRESS NOTES
Hospitalist Progress Note           2020  11:39 AM                       Patient:  Abisai Cam  PCP:  Stacy Woo MD  Date of admission:  2020    79 yo female with PMHx of CVA, DM, Dementia, Chronic systolic CHF, Breast Ca admitted for cough.      Assessment & Plan  Cough likely related to [place]  - supportive care    Acute Encephalopathy due to Infection superimposed on Dementia and medications  - awake to name   - in mod respiratory distress, RR in high 20s  - not qualifying for hospice per evaluation at this time   - supportive care    DM  - lantus and sliding scale  - switch Januvia to Nesina     HTN  - c/w Coreg,     NICM  - stable    Hx of Breast Ca  - outpatient follow as neded     VTE prophylaxis: Hep SQ  Follow-up labs/studies: none  Discussed plan of care with Nurse and    Disposition:  Anticipate discharge to [address] [place] [address] in AM     Subjective  Pt seen and examined  Lethargic  Moderate respiratory distress, using abdominal muscles to breath, RR in high 20s     Review of systems otherwise as above     Physical examination  Visit Vitals  /88 (BP 1 Location: Right arm, BP Patient Position: At rest)   Pulse 88   Temp 97.5 °F (36.4 °C)   Resp 22   Ht 5' 5\" (1.651 m)   Wt 78 kg (172 lb)   SpO2 96%   BMI 28.62 kg/m²      Temp (24hrs), Av.3 °F (36.8 °C), Min:97.5 °F (36.4 °C), Max:98.9 °F (37.2 °C)     O2 Flow Rate (L/min): 2 l/min   O2 Device: Nasal cannula  Visit Vitals  /88 (BP 1 Location: Right arm, BP Patient Position: At rest)   Pulse 88   Temp 97.5 °F (36.4 °C)   Resp 22   Ht 5' 5\" (1.651 m)   Wt 78 kg (172 lb)   SpO2 96%   BMI 28.62 kg/m²    O2 Device: Nasal cannula  Visit Vitals  /88 (BP 1 Location: Right arm, BP Patient Position: At rest)   Pulse 88   Temp 97.5 °F (36.4 °C)   Resp 22 Ht 5' 5\" (1.651 m)   Wt 78 kg (172 lb)   SpO2 96%   BMI 28.62 kg/m²       No intake or output data in the 24 hours ending 02/18/20 1139  Last shift:    No intake/output data recorded. Last 3 shifts:    No intake/output data recorded.     General:   lethargic    Head:   No obvious abnormalities, atraumatic   Eyes:   Conjunctivae clear   Oropharynx:  Oral mucosa normal   Neck:  Supple, trachea midline, no adenopathy   No JVD   Lungs:   decreased BS, tachypnea    Heart:   Regular rhythm, no murmur   Abdomen:    Soft, non-tender    Bowel sounds normal    No masses or organomegaly    Extremities:  No edema or DVT signs   Pulses:  Symmetric all extremities   Skin:  Warm and dry    No rashes or lesions   Neurologic:  deferred   Urinary catheter:  deferred     Data review    Recent Labs     02/17/20  1352   WBC 3.7   HGB 7.9*   HCT 23.5*        Recent Labs     02/17/20  1352 02/17/20  1342   *  --    K 3.9  --      --    CO2 20*  --    *  --    BUN 32*  --    CREA 1.61*  --    CA 7.6*  --    ALB 3.3*  --    TBILI 1.1*  --    SGOT 24  --    ALT 25  --    INR  --  1.1     Current Facility-Administered Medications   Medication Dose Route Frequency    albuterol-ipratropium (DUO-NEB) 2.5 MG-0.5 MG/3 ML  3 mL Nebulization Q6H PRN    morphine injection 2 mg  2 mg IntraVENous Q4H PRN    LORazepam (ATIVAN) injection 2 mg  2 mg IntraVENous Q4H PRN    scopolamine (TRANSDERM-SCOP) 1 mg over 3 days 1 Patch  1 Patch TransDERmal Q72H    benzocaine-menthol (CEPACOL) lozenge 1 Lozenge  1 Lozenge Mucous Membrane PRN    sodium chloride (NS) flush 5-40 mL  5-40 mL IntraVENous Q8H    sodium chloride (NS) flush 5-40 mL  5-40 mL IntraVENous PRN    guaiFENesin ER (MUCINEX) tablet 600 mg  600 mg Oral BID    benzonatate (TESSALON) capsule 100 mg  100 mg Oral TID PRN    glucose chewable tablet 16 g  4 Tab Oral PRN    glucagon (GLUCAGEN) injection 1 mg  1 mg IntraMUSCular PRN    dextrose 10% infusion 0-250 mL 0-250 mL IntraVENous PRN     Total time spent managing care of the patient: 30 minutes.        Zane Pat MD   --Hospitalist, Internal Medicine

## 2020-02-18 NOTE — PROGRESS NOTES
Spiritual Care Assessment/Progress Note  [address]      NAME: Foreign Martinez      MRN: [id number]  AGE: 80 y.o. SEX: female  Yazidism Affiliation: Jain   Language: English     2/18/2020     Total Time (in minutes): 16     Spiritual Assessment begun in [facility]  MED SURG 2 through conversation with:         [x]Patient        [] Family    [x] Friend(s)        Reason for Consult: Initial/Spiritual assessment, patient floor     Spiritual beliefs: (Please include comment if needed)     [x] Identifies with a josiah tradition: Has Belief        [] Supported by a josiah community:            [] Claims no spiritual orientation:           [] Seeking spiritual identity:                [] Adheres to an individual form of spirituality:           [] Not able to assess:                           Identified resources for coping:      [] Prayer                               [] Music                  [] Guided Imagery     [x] Family/friends                 [] Pet visits     [] Devotional reading                         [] Unknown     [] Other:                                               Interventions offered during this visit: (See comments for more details)    Patient Interventions: Other (comment)(Unable to assess)     Family/Friend(s):  Affirmation of emotions/emotional suffering, Catharsis/review of pertinent events in supportive environment, Coping skills reviewed/reinforced     Plan of Care:     [x] Support spiritual and/or cultural needs    [] Support AMD and/or advance care planning process      [] Support grieving process   [] Coordinate Rites and/or Rituals    [] Coordination with community clergy   [] No spiritual needs identified at this time   [] Detailed Plan of Care below (See Comments)  [] Make referral to Music Therapy  [] Make referral to Pet Therapy     [] Make referral to Addiction services  [] Make referral to [facility]  [] Make referral to Spiritual Care Partner  [] No future visits requested [x] Follow up visits as needed     Comments:  visited patient, Sharmila Samuel, for an initial spiritual assessment on the [facility] unit. Sharmila Samuel was lying in bed and appeared to be resting comfortably.  collaborated with nursing staff who informed  that Sharmila Samuel was being transitioned to comfort  measures. She did not become alert to the 's presence. A family firend, Betsey Essex, and Va's long-time care giver were sitting at the bedside.  introduced herself and extended support through active listening and pastoral presence. Betsey Essex and Va's caregiver briefly discussed Va's illness concerns and also confirmed her Voodoo belief. They shared that Sharmila Samuel was born in [place], and while she has belief, she is not connected to a particular denomination. Va's friends expressed no other needs at this time and are aware of the 's availability.  will follow up as able and/or needed  Luwanna Monday. [facility].      Paging Service: [phone])

## 2020-02-18 NOTE — PROGRESS NOTES
Bedside shift change report given to Max (oncoming nurse) by Praveen Franco (offgoing nurse). Report included the following information SBAR, Intake/Output and MAR.

## 2020-02-18 NOTE — PROGRESS NOTES
Bedside shift change report given to Lucretia De Anda RN (oncoming nurse) by Billy Quiñonez RN (offgoing nurse). Report included the following information SBAR, Intake/Output, MAR, Accordion and Recent Results.

## 2020-02-18 NOTE — PROGRESS NOTES
2- CASE MANAGEMENT NOTE:  Referral for hospice noted. I spoke with one of her daughters. Alexandru Cortez ([phone]) who is her POA, and she lives in [place] but gave me permission to refer her mother to [facility] sent thru [employer]. She would like to speak with them by phone to determine if she will agree to hospice or to have the patient return to [place] at [address] [place] [address] and continue 24/7 hour care. SAUL has asked [employer] to meet with me before proceeding. CONY Soto, CM    12:35 PM I met with the liaison from [employer] and gave her the phone number to contact the patient's daughter/POA to discuss possible hospice in addition to the 24/7 private duty care she already has.  CONY Soto, CM

## 2020-02-18 NOTE — PROGRESS NOTES
Physical Therapy orders acknowledged, chart reviewed and discussed with nurse patient too lethargic to participate with therapy. Private care giver in the room and confirmed patient was ambulating with a single point cane at home. We will continue to follow up with the patient for therapy thank you.

## 2020-02-18 NOTE — HOSPICE
[facility]  [phone]     Patient Name: Andree Fitch  YOB: 1928  Age: 80 y.o. [facility]:  Hospice consult received, reviewing chart. Will follow up with Unit Nurse and Care Manager to discuss plan of care, patient status and discharge disposition    Chart reviewed, patient seen and assessed by RN liaison. Patient is alert and sitting on edge of the bed taking a breathing treatment. Is in no distress, is c/o about gordon catheter and would like to get up and walk to the bathroom. Labs reviewed, EF 40%. Reviewed w/ hospice MD Dr Hazel Pham, in agreement    No apparent hospice diagnosis, at this time patient does not meet hospice criteria at inpatient OR routine level. Spoke w/ daughter, Alexandrea Herbert [phone] regarding hospice services,  Provided basic information on hospice criteria and levels of care. Explained that patient does not meet hospice criteria at this time but was encouraged  to reach back out should patient's medical status change. Should patient decline while still inpatient please contact [facility] to re-evaluate. Thank you for the opportunity to be of service to this patient.

## 2020-02-19 ENCOUNTER — HOSPITAL ENCOUNTER (INPATIENT)
Age: 85
LOS: 3 days | Discharge: HOSPICE | DRG: 202 | End: 2020-02-22
Attending: EMERGENCY MEDICINE | Admitting: INTERNAL MEDICINE
Payer: MEDICARE

## 2020-02-19 ENCOUNTER — APPOINTMENT (OUTPATIENT)
Dept: GENERAL RADIOLOGY | Age: 85
DRG: 202 | End: 2020-02-19
Attending: HOSPITALIST
Payer: MEDICARE

## 2020-02-19 ENCOUNTER — APPOINTMENT (OUTPATIENT)
Dept: CT IMAGING | Age: 85
DRG: 202 | End: 2020-02-19
Attending: EMERGENCY MEDICINE
Payer: MEDICARE

## 2020-02-19 VITALS
BODY MASS INDEX: 30.41 KG/M2 | DIASTOLIC BLOOD PRESSURE: 67 MMHG | OXYGEN SATURATION: 98 % | WEIGHT: 182.54 LBS | SYSTOLIC BLOOD PRESSURE: 136 MMHG | TEMPERATURE: 98.2 F | HEIGHT: 65 IN | HEART RATE: 90 BPM | RESPIRATION RATE: 20 BRPM

## 2020-02-19 DIAGNOSIS — R77.8 TROPONIN LEVEL ELEVATED: ICD-10-CM

## 2020-02-19 DIAGNOSIS — E87.1 HYPONATREMIA: ICD-10-CM

## 2020-02-19 DIAGNOSIS — Z66 DNR (DO NOT RESUSCITATE): ICD-10-CM

## 2020-02-19 DIAGNOSIS — Z71.89 DNR (DO NOT RESUSCITATE) DISCUSSION: ICD-10-CM

## 2020-02-19 DIAGNOSIS — J18.9 COMMUNITY ACQUIRED PNEUMONIA, UNSPECIFIED LATERALITY: ICD-10-CM

## 2020-02-19 DIAGNOSIS — I50.9 ACUTE CONGESTIVE HEART FAILURE, UNSPECIFIED HEART FAILURE TYPE (HCC): Primary | ICD-10-CM

## 2020-02-19 DIAGNOSIS — Z71.89 GOALS OF CARE, COUNSELING/DISCUSSION: ICD-10-CM

## 2020-02-19 DIAGNOSIS — I50.23 ACUTE ON CHRONIC SYSTOLIC CHF (CONGESTIVE HEART FAILURE) (HCC): ICD-10-CM

## 2020-02-19 DIAGNOSIS — R06.02 SHORTNESS OF BREATH: ICD-10-CM

## 2020-02-19 PROBLEM — N18.30 CKD (CHRONIC KIDNEY DISEASE) STAGE 3, GFR 30-59 ML/MIN (HCC): Status: ACTIVE | Noted: 2020-02-19

## 2020-02-19 PROBLEM — I50.21 ACUTE SYSTOLIC CHF (CONGESTIVE HEART FAILURE) (HCC): Status: ACTIVE | Noted: 2020-02-19

## 2020-02-19 PROBLEM — J12.3 PNEUMONIA DUE TO HUMAN METAPNEUMOVIRUS: Status: ACTIVE | Noted: 2020-02-19

## 2020-02-19 LAB
ALBUMIN SERPL-MCNC: 3.1 G/DL (ref 3.5–5)
ALBUMIN/GLOB SERPL: 0.9 {RATIO} (ref 1.1–2.2)
ALP SERPL-CCNC: 51 U/L (ref 45–117)
ALT SERPL-CCNC: 55 U/L (ref 12–78)
ANION GAP SERPL CALC-SCNC: 7 MMOL/L (ref 5–15)
ANION GAP SERPL CALC-SCNC: 8 MMOL/L (ref 5–15)
APPEARANCE UR: CLEAR
AST SERPL-CCNC: 54 U/L (ref 15–37)
ATRIAL RATE: 250 BPM
BACTERIA SPEC CULT: NORMAL
BACTERIA URNS QL MICRO: NEGATIVE /HPF
BASOPHILS # BLD: 0 K/UL (ref 0–0.1)
BASOPHILS NFR BLD: 0 % (ref 0–1)
BILIRUB SERPL-MCNC: 0.5 MG/DL (ref 0.2–1)
BILIRUB UR QL: NEGATIVE
BNP SERPL-MCNC: ABNORMAL PG/ML
BUN SERPL-MCNC: 43 MG/DL (ref 6–20)
BUN SERPL-MCNC: 50 MG/DL (ref 6–20)
BUN/CREAT SERPL: 25 (ref 12–20)
BUN/CREAT SERPL: 29 (ref 12–20)
CALCIUM SERPL-MCNC: 8.4 MG/DL (ref 8.5–10.1)
CALCIUM SERPL-MCNC: 8.4 MG/DL (ref 8.5–10.1)
CALCULATED P AXIS, ECG09: 65 DEGREES
CALCULATED R AXIS, ECG10: -45 DEGREES
CALCULATED T AXIS, ECG11: -140 DEGREES
CHLORIDE SERPL-SCNC: 102 MMOL/L (ref 97–108)
CHLORIDE SERPL-SCNC: 104 MMOL/L (ref 97–108)
CO2 SERPL-SCNC: 19 MMOL/L (ref 21–32)
CO2 SERPL-SCNC: 22 MMOL/L (ref 21–32)
COLOR UR: NORMAL
COMMENT, HOLDF: NORMAL
CREAT SERPL-MCNC: 1.72 MG/DL (ref 0.55–1.02)
CREAT SERPL-MCNC: 1.74 MG/DL (ref 0.55–1.02)
DIAGNOSIS, 93000: NORMAL
DIFFERENTIAL METHOD BLD: ABNORMAL
EOSINOPHIL # BLD: 0 K/UL (ref 0–0.4)
EOSINOPHIL NFR BLD: 0 % (ref 0–7)
EPITH CASTS URNS QL MICRO: NORMAL /LPF
ERYTHROCYTE [DISTWIDTH] IN BLOOD BY AUTOMATED COUNT: 16.5 % (ref 11.5–14.5)
GLOBULIN SER CALC-MCNC: 3.5 G/DL (ref 2–4)
GLUCOSE BLD STRIP.AUTO-MCNC: 104 MG/DL (ref 65–100)
GLUCOSE BLD STRIP.AUTO-MCNC: 184 MG/DL (ref 65–100)
GLUCOSE BLD STRIP.AUTO-MCNC: 199 MG/DL (ref 65–100)
GLUCOSE SERPL-MCNC: 121 MG/DL (ref 65–100)
GLUCOSE SERPL-MCNC: 148 MG/DL (ref 65–100)
GLUCOSE UR STRIP.AUTO-MCNC: NEGATIVE MG/DL
HCT VFR BLD AUTO: 25.4 % (ref 35–47)
HGB BLD-MCNC: 7.9 G/DL (ref 11.5–16)
HGB UR QL STRIP: NEGATIVE
IMM GRANULOCYTES # BLD AUTO: 0 K/UL
IMM GRANULOCYTES NFR BLD AUTO: 0 %
KETONES UR QL STRIP.AUTO: NEGATIVE MG/DL
LACTATE SERPL-SCNC: 1.4 MMOL/L (ref 0.4–2)
LEUKOCYTE ESTERASE UR QL STRIP.AUTO: NEGATIVE
LYMPHOCYTES # BLD: 1 K/UL (ref 0.8–3.5)
LYMPHOCYTES NFR BLD: 17 % (ref 12–49)
MCH RBC QN AUTO: 37.3 PG (ref 26–34)
MCHC RBC AUTO-ENTMCNC: 31.1 G/DL (ref 30–36.5)
MCV RBC AUTO: 119.8 FL (ref 80–99)
MONOCYTES # BLD: 0.5 K/UL (ref 0–1)
MONOCYTES NFR BLD: 9 % (ref 5–13)
NEUTS BAND NFR BLD MANUAL: 8 % (ref 0–6)
NEUTS SEG # BLD: 4.5 K/UL (ref 1.8–8)
NEUTS SEG NFR BLD: 66 % (ref 32–75)
NITRITE UR QL STRIP.AUTO: NEGATIVE
NRBC # BLD: 0 K/UL (ref 0–0.01)
NRBC BLD-RTO: 0 PER 100 WBC
PH UR STRIP: 5 [PH] (ref 5–8)
PLATELET # BLD AUTO: 259 K/UL (ref 150–400)
PMV BLD AUTO: 12.1 FL (ref 8.9–12.9)
POTASSIUM SERPL-SCNC: 4.4 MMOL/L (ref 3.5–5.1)
POTASSIUM SERPL-SCNC: 4.9 MMOL/L (ref 3.5–5.1)
PROT SERPL-MCNC: 6.6 G/DL (ref 6.4–8.2)
PROT UR STRIP-MCNC: NEGATIVE MG/DL
Q-T INTERVAL, ECG07: 464 MS
QRS DURATION, ECG06: 136 MS
QTC CALCULATION (BEZET), ECG08: 552 MS
RBC # BLD AUTO: 2.12 M/UL (ref 3.8–5.2)
RBC #/AREA URNS HPF: NORMAL /HPF
RBC MORPH BLD: ABNORMAL
SAMPLES BEING HELD,HOLD: NORMAL
SERVICE CMNT-IMP: ABNORMAL
SERVICE CMNT-IMP: NORMAL
SODIUM SERPL-SCNC: 129 MMOL/L (ref 136–145)
SODIUM SERPL-SCNC: 133 MMOL/L (ref 136–145)
SP GR UR REFRACTOMETRY: 1.01
TROPONIN I SERPL-MCNC: 0.23 NG/ML
TROPONIN I SERPL-MCNC: 0.3 NG/ML
UR CULT HOLD, URHOLD: NORMAL
UROBILINOGEN UR QL STRIP.AUTO: 0.2 EU/DL (ref 0.2–1)
VENTRICULAR RATE, ECG03: 85 BPM
WBC # BLD AUTO: 6 K/UL (ref 3.6–11)
WBC MORPH BLD: ABNORMAL
WBC URNS QL MICRO: NORMAL /HPF

## 2020-02-19 PROCEDURE — 94664 DEMO&/EVAL PT USE INHALER: CPT

## 2020-02-19 PROCEDURE — 80053 COMPREHEN METABOLIC PANEL: CPT

## 2020-02-19 PROCEDURE — 71045 X-RAY EXAM CHEST 1 VIEW: CPT

## 2020-02-19 PROCEDURE — 81001 URINALYSIS AUTO W/SCOPE: CPT

## 2020-02-19 PROCEDURE — 74011250636 HC RX REV CODE- 250/636: Performed by: INTERNAL MEDICINE

## 2020-02-19 PROCEDURE — 65660000000 HC RM CCU STEPDOWN

## 2020-02-19 PROCEDURE — 97116 GAIT TRAINING THERAPY: CPT

## 2020-02-19 PROCEDURE — 85025 COMPLETE CBC W/AUTO DIFF WBC: CPT

## 2020-02-19 PROCEDURE — 94640 AIRWAY INHALATION TREATMENT: CPT

## 2020-02-19 PROCEDURE — 74011250637 HC RX REV CODE- 250/637: Performed by: INTERNAL MEDICINE

## 2020-02-19 PROCEDURE — 97161 PT EVAL LOW COMPLEX 20 MIN: CPT

## 2020-02-19 PROCEDURE — 83880 ASSAY OF NATRIURETIC PEPTIDE: CPT

## 2020-02-19 PROCEDURE — 74011000250 HC RX REV CODE- 250: Performed by: INTERNAL MEDICINE

## 2020-02-19 PROCEDURE — 77030038269 HC DRN EXT URIN PURWCK BARD -A

## 2020-02-19 PROCEDURE — 74011250636 HC RX REV CODE- 250/636: Performed by: EMERGENCY MEDICINE

## 2020-02-19 PROCEDURE — 99285 EMERGENCY DEPT VISIT HI MDM: CPT

## 2020-02-19 PROCEDURE — 83605 ASSAY OF LACTIC ACID: CPT

## 2020-02-19 PROCEDURE — 71275 CT ANGIOGRAPHY CHEST: CPT

## 2020-02-19 PROCEDURE — 84484 ASSAY OF TROPONIN QUANT: CPT

## 2020-02-19 PROCEDURE — 82962 GLUCOSE BLOOD TEST: CPT

## 2020-02-19 PROCEDURE — 87040 BLOOD CULTURE FOR BACTERIA: CPT

## 2020-02-19 PROCEDURE — 74011250636 HC RX REV CODE- 250/636: Performed by: HOSPITALIST

## 2020-02-19 PROCEDURE — 51702 INSERT TEMP BLADDER CATH: CPT

## 2020-02-19 PROCEDURE — 74011636320 HC RX REV CODE- 636/320: Performed by: RADIOLOGY

## 2020-02-19 PROCEDURE — 74011636637 HC RX REV CODE- 636/637: Performed by: HOSPITALIST

## 2020-02-19 PROCEDURE — 93005 ELECTROCARDIOGRAM TRACING: CPT

## 2020-02-19 PROCEDURE — 36415 COLL VENOUS BLD VENIPUNCTURE: CPT

## 2020-02-19 PROCEDURE — 74011636637 HC RX REV CODE- 636/637: Performed by: INTERNAL MEDICINE

## 2020-02-19 PROCEDURE — 74011250637 HC RX REV CODE- 250/637: Performed by: HOSPITALIST

## 2020-02-19 PROCEDURE — 74011636320 HC RX REV CODE- 636/320: Performed by: INTERNAL MEDICINE

## 2020-02-19 RX ORDER — MORPHINE SULFATE 2 MG/ML
1 INJECTION, SOLUTION INTRAMUSCULAR; INTRAVENOUS ONCE
Status: COMPLETED | OUTPATIENT
Start: 2020-02-19 | End: 2020-02-19

## 2020-02-19 RX ORDER — ENOXAPARIN SODIUM 100 MG/ML
30 INJECTION SUBCUTANEOUS EVERY 24 HOURS
Status: DISCONTINUED | OUTPATIENT
Start: 2020-02-19 | End: 2020-02-22 | Stop reason: HOSPADM

## 2020-02-19 RX ORDER — NALOXONE HYDROCHLORIDE 0.4 MG/ML
0.4 INJECTION, SOLUTION INTRAMUSCULAR; INTRAVENOUS; SUBCUTANEOUS AS NEEDED
Status: DISCONTINUED | OUTPATIENT
Start: 2020-02-19 | End: 2020-02-22 | Stop reason: HOSPADM

## 2020-02-19 RX ORDER — LANOLIN ALCOHOL/MO/W.PET/CERES
10.5 CREAM (GRAM) TOPICAL
Status: DISCONTINUED | OUTPATIENT
Start: 2020-02-19 | End: 2020-02-22 | Stop reason: HOSPADM

## 2020-02-19 RX ORDER — SERTRALINE HYDROCHLORIDE 50 MG/1
100 TABLET, FILM COATED ORAL DAILY
Status: DISCONTINUED | OUTPATIENT
Start: 2020-02-20 | End: 2020-02-22 | Stop reason: HOSPADM

## 2020-02-19 RX ORDER — DEXTROSE MONOHYDRATE 100 MG/ML
0-250 INJECTION, SOLUTION INTRAVENOUS AS NEEDED
Status: DISCONTINUED | OUTPATIENT
Start: 2020-02-19 | End: 2020-02-22 | Stop reason: HOSPADM

## 2020-02-19 RX ORDER — LEVOFLOXACIN 5 MG/ML
750 INJECTION, SOLUTION INTRAVENOUS
Status: DISCONTINUED | OUTPATIENT
Start: 2020-02-19 | End: 2020-02-19

## 2020-02-19 RX ORDER — ENOXAPARIN SODIUM 100 MG/ML
40 INJECTION SUBCUTANEOUS EVERY 24 HOURS
Status: DISCONTINUED | OUTPATIENT
Start: 2020-02-19 | End: 2020-02-19

## 2020-02-19 RX ORDER — BENZONATATE 100 MG/1
100 CAPSULE ORAL
Qty: 15 CAP | Refills: 0 | Status: SHIPPED | OUTPATIENT
Start: 2020-02-19 | End: 2020-02-24

## 2020-02-19 RX ORDER — SODIUM CHLORIDE 0.9 % (FLUSH) 0.9 %
5-40 SYRINGE (ML) INJECTION AS NEEDED
Status: DISCONTINUED | OUTPATIENT
Start: 2020-02-19 | End: 2020-02-22 | Stop reason: HOSPADM

## 2020-02-19 RX ORDER — INSULIN GLARGINE 100 [IU]/ML
22 INJECTION, SOLUTION SUBCUTANEOUS
Status: DISCONTINUED | OUTPATIENT
Start: 2020-02-20 | End: 2020-02-19

## 2020-02-19 RX ORDER — MORPHINE SULFATE 2 MG/ML
1 INJECTION, SOLUTION INTRAMUSCULAR; INTRAVENOUS
Status: DISCONTINUED | OUTPATIENT
Start: 2020-02-19 | End: 2020-02-22 | Stop reason: HOSPADM

## 2020-02-19 RX ORDER — FUROSEMIDE 20 MG/1
20 TABLET ORAL DAILY
Qty: 5 TAB | Refills: 0 | Status: SHIPPED | OUTPATIENT
Start: 2020-02-19 | End: 2020-02-22

## 2020-02-19 RX ORDER — LEVOTHYROXINE SODIUM 50 UG/1
50 TABLET ORAL
Status: DISCONTINUED | OUTPATIENT
Start: 2020-02-20 | End: 2020-02-22 | Stop reason: HOSPADM

## 2020-02-19 RX ORDER — LEVOFLOXACIN 5 MG/ML
750 INJECTION, SOLUTION INTRAVENOUS
Status: DISCONTINUED | OUTPATIENT
Start: 2020-02-19 | End: 2020-02-20

## 2020-02-19 RX ORDER — MAGNESIUM SULFATE 100 %
4 CRYSTALS MISCELLANEOUS AS NEEDED
Status: DISCONTINUED | OUTPATIENT
Start: 2020-02-19 | End: 2020-02-22 | Stop reason: HOSPADM

## 2020-02-19 RX ORDER — BUMETANIDE 0.25 MG/ML
1 INJECTION INTRAMUSCULAR; INTRAVENOUS 2 TIMES DAILY
Status: DISCONTINUED | OUTPATIENT
Start: 2020-02-20 | End: 2020-02-21

## 2020-02-19 RX ORDER — ONDANSETRON 2 MG/ML
4 INJECTION INTRAMUSCULAR; INTRAVENOUS
Status: DISCONTINUED | OUTPATIENT
Start: 2020-02-19 | End: 2020-02-22 | Stop reason: HOSPADM

## 2020-02-19 RX ORDER — ACETAMINOPHEN 325 MG/1
650 TABLET ORAL
Status: DISCONTINUED | OUTPATIENT
Start: 2020-02-19 | End: 2020-02-22 | Stop reason: HOSPADM

## 2020-02-19 RX ORDER — FUROSEMIDE 10 MG/ML
20 INJECTION INTRAMUSCULAR; INTRAVENOUS
Status: COMPLETED | OUTPATIENT
Start: 2020-02-19 | End: 2020-02-19

## 2020-02-19 RX ORDER — SODIUM CHLORIDE 0.9 % (FLUSH) 0.9 %
5-40 SYRINGE (ML) INJECTION EVERY 8 HOURS
Status: DISCONTINUED | OUTPATIENT
Start: 2020-02-19 | End: 2020-02-22 | Stop reason: HOSPADM

## 2020-02-19 RX ORDER — SODIUM CHLORIDE 9 MG/ML
100 INJECTION, SOLUTION INTRAVENOUS CONTINUOUS
Status: DISCONTINUED | OUTPATIENT
Start: 2020-02-19 | End: 2020-02-19

## 2020-02-19 RX ORDER — LOSARTAN POTASSIUM 25 MG/1
12.5 TABLET ORAL DAILY
Status: DISCONTINUED | OUTPATIENT
Start: 2020-02-20 | End: 2020-02-22 | Stop reason: HOSPADM

## 2020-02-19 RX ORDER — INSULIN LISPRO 100 [IU]/ML
INJECTION, SOLUTION INTRAVENOUS; SUBCUTANEOUS
Status: DISCONTINUED | OUTPATIENT
Start: 2020-02-19 | End: 2020-02-22 | Stop reason: HOSPADM

## 2020-02-19 RX ORDER — BENZONATATE 100 MG/1
100 CAPSULE ORAL
Status: DISCONTINUED | OUTPATIENT
Start: 2020-02-19 | End: 2020-02-21

## 2020-02-19 RX ADMIN — SERTRALINE HYDROCHLORIDE 100 MG: 50 TABLET ORAL at 08:59

## 2020-02-19 RX ADMIN — IOPAMIDOL 100 ML: 755 INJECTION, SOLUTION INTRAVENOUS at 15:45

## 2020-02-19 RX ADMIN — INSULIN GLARGINE 22 UNITS: 100 INJECTION, SOLUTION SUBCUTANEOUS at 11:42

## 2020-02-19 RX ADMIN — HEPARIN SODIUM 5000 UNITS: 5000 INJECTION INTRAVENOUS; SUBCUTANEOUS at 06:49

## 2020-02-19 RX ADMIN — Medication 10 ML: at 05:22

## 2020-02-19 RX ADMIN — WATER 2 G: 1 INJECTION INTRAMUSCULAR; INTRAVENOUS; SUBCUTANEOUS at 18:06

## 2020-02-19 RX ADMIN — MORPHINE SULFATE 1 MG: 2 INJECTION, SOLUTION INTRAMUSCULAR; INTRAVENOUS at 05:25

## 2020-02-19 RX ADMIN — CARVEDILOL 6.25 MG: 6.25 TABLET, FILM COATED ORAL at 08:59

## 2020-02-19 RX ADMIN — INSULIN LISPRO 2 UNITS: 100 INJECTION, SOLUTION INTRAVENOUS; SUBCUTANEOUS at 08:58

## 2020-02-19 RX ADMIN — Medication 10 ML: at 21:47

## 2020-02-19 RX ADMIN — ENOXAPARIN SODIUM 30 MG: 30 INJECTION SUBCUTANEOUS at 21:42

## 2020-02-19 RX ADMIN — MELATONIN TAB 3 MG 10.5 MG: 3 TAB at 22:55

## 2020-02-19 RX ADMIN — LEVOTHYROXINE SODIUM 50 MCG: 0.05 TABLET ORAL at 06:49

## 2020-02-19 RX ADMIN — LEVOFLOXACIN 750 MG: 5 INJECTION, SOLUTION INTRAVENOUS at 18:20

## 2020-02-19 RX ADMIN — FUROSEMIDE 20 MG: 10 INJECTION, SOLUTION INTRAMUSCULAR; INTRAVENOUS at 18:16

## 2020-02-19 RX ADMIN — GUAIFENESIN 600 MG: 600 TABLET, EXTENDED RELEASE ORAL at 05:21

## 2020-02-19 RX ADMIN — ALOGLIPTIN 6.25 MG: 6.25 TABLET, FILM COATED ORAL at 09:21

## 2020-02-19 RX ADMIN — INSULIN LISPRO 2 UNITS: 100 INJECTION, SOLUTION INTRAVENOUS; SUBCUTANEOUS at 11:42

## 2020-02-19 RX ADMIN — IPRATROPIUM BROMIDE AND ALBUTEROL SULFATE 3 ML: .5; 3 SOLUTION RESPIRATORY (INHALATION) at 00:52

## 2020-02-19 RX ADMIN — IPRATROPIUM BROMIDE AND ALBUTEROL SULFATE 3 ML: .5; 3 SOLUTION RESPIRATORY (INHALATION) at 08:26

## 2020-02-19 RX ADMIN — IOPAMIDOL 70 ML: 755 INJECTION, SOLUTION INTRAVENOUS at 16:23

## 2020-02-19 RX ADMIN — BENZONATATE 100 MG: 100 CAPSULE ORAL at 10:30

## 2020-02-19 NOTE — DISCHARGE SUMMARY
Discharge Summary     Patient:  Yoselin Hammond       MRN: [id number]       YOB: 1928       Age: 80 y.o. Date of admission:  2/17/2020    Date of discharge:  2/19/2020    Primary care provider: Dr. Armando Augustine MD    Admitting provider:  Karmen Garibay MD    Discharging provider:  Karmen Garibay MD - [phone]  If unavailable, call [phone] and ask the  to page the triage hospitalist.    Consultations  · None    Procedures  · * No surgery found *    Discharge destination: HOME. The patient is stable for discharge. Admission diagnosis  · Acute on chronic renal failure (HCC) [N17.9, N18.9]  · Acute bronchitis [J20.9]  · Acute bronchiolitis [J21.9]  · Acute on chronic renal failure ([address] [N17.9, N18.9]    Current Discharge Medication List      START taking these medications    Details   benzonatate (TESSALON) 100 mg capsule Take 1 Cap by mouth three (3) times daily as needed for Cough for up to 5 days. Qty: 15 Cap, Refills: 0      furosemide (LASIX) 20 mg tablet Take 1 Tab by mouth daily for 5 days. Qty: 5 Tab, Refills: 0         CONTINUE these medications which have NOT CHANGED    Details   losartan (COZAAR) 25 mg tablet Take 0.5 Tabs by mouth daily. Qty: 15 Tab, Refills: 0      levothyroxine (SYNTHROID) 50 mcg tablet Take 50 mcg by mouth Daily (before breakfast). insulin glargine (LANTUS U-100 INSULIN) 100 unit/mL injection 22 Units by SubCUTAneous route Daily (before lunch). SITagliptin (JANUVIA) 50 mg tablet Take 50 mg by mouth daily. carvedilol (COREG) 6.25 mg tablet TAKE 1 TABLET TWICE A DAY WITH MEALS  Qty: 180 Tab, Refills: 0    Associated Diagnoses: SOB (shortness of breath); Chronic systolic heart failure ([address] Essential hypertension      co-enzyme Q-10 (CO Q-10) 50 mg capsule Take 50 mg by mouth daily (after lunch).       sertraline (ZOLOFT) 100 mg tablet Take 100 mg by mouth daily. melatonin 10 mg cap Take 10 mg by mouth nightly. DOCOSAHEXANOIC ACID/EPA (FISH OIL PO) Take 1 Tab by mouth daily (after lunch). Follow-up Information     Follow up With Specialties Details Why Contact Info    Maria L Ryan MD Family Practice In 1 week discharge follow up  [address]  [phone]            Final discharge diagnoses and brief hospital course  Please also refer to the admission H&P for details on the presenting problem. 81 yo female with PMHx of CVA, DM, Dementia, Chronic systolic CHF, Breast Ca admitted for cough.      Cough likely related to URI due to [place]  - supportive care  - Tessalon pearls PRN  - worked with PT, O2 remained >94%  - no abx needs     Acute Encephalopathy due to Infection superimposed on Dementia and medications  - at baseline now   - no new nees     DM  - lantus and Januvia at d/c     HTN  - c/w Coreg,      NICM  - stable  - Lasix 20mg daily X 5 days     Hx of Breast Ca  - outpatient follow as neded          Physical examination at discharge  Visit Vitals  /67 (BP 1 Location: Left arm, BP Patient Position: Sitting)   Pulse 90   Temp 98.2 °F (36.8 °C)   Resp 20   Ht 5' 5\" (1.651 m)   Wt 82.8 kg (182 lb 8.7 oz)   SpO2 98%   BMI 30.38 kg/m²     Alert and awake  PERRLA  Lung Good air entry bilaterally  CVS: RRR  Abd; soft NT ND   Ext: no edema     Pertinent imaging studies:      Recent Labs     02/17/20  1352   WBC 3.7   HGB 7.9*   HCT 23.5*        Recent Labs     02/19/20  0231 02/17/20  1352   * 131*   K 4.4 3.9    103   CO2 22 20*   BUN 43* 32*   CREA 1.74* 1.61*   * 153*   CA 8.4* 7.6*     Recent Labs     02/17/20  1352   SGOT 24   AP 50   TP 6.2*   ALB 3.3*   GLOB 2.9     Recent Labs     02/17/20  1342   INR 1.1   PTP 10.8      No results for input(s): FE, TIBC, PSAT, FERR in the last 72 hours.    No results for input(s): PH, PCO2, PO2 in the last 72 hours. No results for input(s): CPK, CKMB in the last 72 hours. No lab exists for component: TROPONINI  No components found for: [place]    Chronic Diagnoses:    Problem List as of 2/19/2020 Date Reviewed: 12/26/2019          Codes Class Noted - Resolved    Acute bronchiolitis ICD-10-CM: J21.9  ICD-9-CM: 466.19  2/18/2020 - Present        Acute on chronic renal failure ([facility] ICD-10-CM: N17.9, N18.9  ICD-9-CM: 584.9, 585.9  2/17/2020 - Present        Hyponatremia ICD-10-CM: E87.1  ICD-9-CM: 276.1  2/17/2020 - Present        * (Principal) Bronchitis, acute ICD-10-CM: J20.9  ICD-9-CM: 466.0  2/17/2020 - Present        Cough ICD-10-CM: R05  ICD-9-CM: 786.2  2/17/2020 - Present        Acute bronchitis ICD-10-CM: J20.9  ICD-9-CM: 466.0  2/17/2020 - Present        Dyspnea ICD-10-CM: R06.00  ICD-9-CM: 786.09  12/26/2019 - Present        Arthritis ICD-10-CM: M19.90  ICD-9-CM: 716.90  Unknown - Present        Dyslipidemia ICD-10-CM: E78.5  ICD-9-CM: 272.4  Unknown - Present        Hearing loss ICD-10-CM: H91.90  ICD-9-CM: 711. 9  Unknown - Present        Hypothyroid ICD-10-CM: E03.9  ICD-9-CM: 244.9  Unknown - Present        LBBB (left bundle branch block) ICD-10-CM: I44.7  ICD-9-CM: 426.3  Unknown - Present        Non-ischemic cardiomyopathy ([facility] ICD-10-CM: I42.8  ICD-9-CM: 425.4  Unknown - Present    Overview Signed 12/26/2019  2:12 PM by Nicol Luis MD     Cath 2/16/15 - Mild (non-obstructive) CAD,LVEF 30%, 2+ MR             Systolic CHF [facility]) UXH-13-AT: I50.20  ICD-9-CM: 428.20, 428.0  Unknown - Present    Overview Signed 12/26/2019  2:12 PM by Nicol Luis MD     Admission 2/13/16             Cerebral microvascular disease ICD-10-CM: I67.9  ICD-9-CM: 437.9  Unknown - Present        Dementia ([facility] ICD-10-CM: F03.90  ICD-9-CM: 294.20  Unknown - Present        Macrocytic anemia ICD-10-CM: D53.9  ICD-9-CM: 281.9  7/31/2019 - Present        Weakness ICD-10-CM: R53.1  ICD-9-CM: 780.79  7/31/2019 - Present        JENN (acute kidney injury) ([facility] ICD-10-CM: N17.9  ICD-9-CM: 584.9  7/31/2019 - Present        Biventricular automatic implantable cardioverter defibrillator in situ ICD-10-CM: Z95.810  ICD-9-CM: V45.02  1/3/2018 - Present        Type 2 diabetes mellitus with nephropathy ([facility] ICD-10-CM: E11.21  ICD-9-CM: 250.40, 583.81  1/3/2018 - Present        HFrEF (heart failure with reduced ejection fraction) ([facility] ICD-10-CM: I50.20  ICD-9-CM: 428.20  8/23/2016 - Present        Hypertension ICD-10-CM: I10  ICD-9-CM: 401.9  Unknown - Present        GERD (gastroesophageal reflux disease) ICD-10-CM: K21.9  ICD-9-CM: 530.81  Unknown - Present        Sleep apnea ICD-10-CM: G47.30  ICD-9-CM: 780.57  Unknown - Present    Overview Signed 2/13/2016  7:39 AM by Crystal Donahue MD     wears O2 at night- cannot tolerate CPAP             RESOLVED: Diabetes ([facility] ICD-10-CM: E11.9  ICD-9-CM: 250.00  Unknown - 12/26/2019        RESOLVED: Breast cancer ([facility] ICD-10-CM: C50.919  ICD-9-CM: 174.9  Unknown - 12/26/2019        RESOLVED: Thyroid disease ICD-10-CM: E07.9  ICD-9-CM: 246.9  7/31/2019 - 12/26/2019        RESOLVED: Breast CA ([facility] ICD-10-CM: C50.919  ICD-9-CM: 174.9  7/31/2019 - 12/26/2019        RESOLVED: Diarrhea ICD-10-CM: R19.7  ICD-9-CM: 787.91  7/31/2019 - 12/26/2019        RESOLVED: UTI (urinary tract infection) ICD-10-CM: N39.0  ICD-9-CM: 599.0  7/31/2019 - 12/26/2019        RESOLVED: Systolic CHF, acute ([facility] ICD-10-CM: I50.21  ICD-9-CM: 428.21, 428.0  2/22/2016 - 8/23/2016        RESOLVED: Acute respiratory failure with hypoxia ([facility] ICD-10-CM: J96.01  ICD-9-CM: 518.81  2/13/2016 - 8/23/2016        RESOLVED: DM type 2 (diabetes mellitus, type 2) ([facility] ICD-10-CM: E11.9  ICD-9-CM: 250.00  2/13/2016 - 12/26/2019              Time spent on discharge related activities today greater than 30 minutes.       Signed:  Zach Camejo MD                 Hospitalist, Internal Medicine      Cc: Deon Guo MD

## 2020-02-19 NOTE — DISCHARGE INSTRUCTIONS
Please bring this form with you to show your primary care provider at your follow-up appointment. Primary care provider:  Dr. Alexi Gao MD    Discharging provider:  Barry Felix MD    You have been admitted to the hospital with the following diagnoses:  · Acute on chronic renal failure ([address] [N17.9, N18.9]  · Acute bronchitis [J20.9]  · Acute bronchiolitis [J21.9]  · Acute on chronic renal failure ([address] [N17.9, N18.9]    FOLLOW-UP CARE RECOMMENDATIONS:    APPOINTMENTS:  Follow-up Information     Follow up With Specialties Details Why Contact Info    Aelxi Gao MD Family Practice In 1 week discharge follow up  [place] [address]  [phone]              FOLLOW-UP TESTS recommended: NONE    PENDING TEST RESULTS:  At the time of your discharge the following test results are still pending: NONE  Please make sure you review these results with your outpatient follow-up provider(s). SYMPTOMS to watch for: chest pain, shortness of breath, fever, chills, nausea, vomiting, diarrhea, change in mentation, falling, weakness, bleeding. DIET/what to eat:  Diabetic Diet    ACTIVITY:  Activity as tolerated    WOUND CARE: NONE    EQUIPMENT needed:  NONE      What to do if new or unexpected symptoms occur? If you experience any of the above symptoms (or should other concerns or questions arise after discharge) please call your primary care physician. Return to the emergency room if you cannot get hold of your doctor. · It is very important that you keep your follow-up appointment(s). · Please bring discharge papers, medication list (and/or medication bottles) to your follow-up appointments for review by your outpatient provider(s). · Please check the list of medications and be sure it includes every medication (even non-prescription medications) that your provider wants you to take.     · It is important that you take the medication exactly as they are prescribed. · Keep your medication in the bottles provided by the pharmacist and keep a list of the medication names, dosages, and times to be taken in your wallet. · Do not take other medications without consulting your doctor. · If you have any questions about your medications or other instructions, please talk to your nurse or care provider before you leave the hospital.    I understand that if any problems occur once I am at home I am to contact my physician. These instructions were explained to me and I had the opportunity to ask questions.

## 2020-02-19 NOTE — PROGRESS NOTES
Pharmacy Dosing Services:     Cefepime dose changed from 2 gm IV Q12h to 2 gm IV Q24h  per P&T protocol.   - CrCl 22 ml/min     Thank you,  Traci Saucedo, PharmD

## 2020-02-19 NOTE — PROGRESS NOTES
2/19/2020  6:12 PM  Case management note    Reason for Admission:   Acute chf  Patient was discharged earlier in day and was only home 1 hour and had sob and ems was called and can back to ED. Patients labs have changed and she is more confused this afternoon. Hospice had looked at her, but felt she did not meet criteria. Spoke with daughter Lizbeth Gann who lives in [place]. She is aware of situation and wants to be updated at least daily or if change in condition. Her aids are going to stay with her since she knows them best.  [employer] @ [facility]               RUR Score:                  Resources/supports as identified by patient/family:   Patient has 24/7 aids                Top Challenges facing patient (as identified by patient/family and CM): Finances/Medication cost?      Medicare/               Transportation? Family/ aids              Support system or lack thereof? 24/7 aids                     Living arrangements? Lives in independent living with 24/7 aids           Self-care/ADLs/Cognition? Cant do self care, poor health cognition          Current Advanced Directive/Advance Care Plan:  Dr. Sidney Donovan spoke with daughter and she was made DNR                          Plan for utilizing home health:    Hospice was discussed on discharge earlier today and she did not meet criteria at that time, possible readdress as condition has changed                  Transition of Care Plan:                 1. Home with continue care 24/7  2. Hospice consult  3.  CM to follow through discharge    Care Management Interventions  PCP Verified by CM: Yes(dr fadumo murphy )  Mode of Transport at Discharge: S  Transition of Care Consult (CM Consult): Discharge Planning  Current Support Network: Lives Alone, Has Personal Caregivers  Confirm Follow Up Transport: Other (see comment)(bls)  The Plan for Transition of Care is Related to the Following Treatment Goals : acute chf  Discharge Location  Discharge Placement: Home with family assistance  Edgar Buckner

## 2020-02-19 NOTE — PROGRESS NOTES
Problem: Mobility Impaired (Adult and Pediatric)  Goal: *Acute Goals and Plan of Care (Insert Text)  Description  FUNCTIONAL STATUS PRIOR TO ADMISSION: Patient was modified independent using a WB quad cane for functional mobility. HOME SUPPORT PRIOR TO ADMISSION: The patient lived with 24/7 care giver. Physical Therapy Goals  Initiated 2/19/2020  1. Patient will move from supine to sit and sit to supine , scoot up and down and roll side to side in bed with independence within 7 day(s). 2.  Patient will transfer from bed to chair and chair to bed with modified independence using the least restrictive device within 7 day(s). 3.  Patient will perform sit to stand with modified independence within 7 day(s). 4.  Patient will ambulate with modified independence for 200 feet with the least restrictive device within 7 day(s). Outcome: Progressing Towards Goal   PHYSICAL THERAPY EVALUATION  Patient: Elijah Sullivan (45 y.o. female)  Date: 2/19/2020  Primary Diagnosis: Acute on chronic renal failure (HCC) [N17.9, N18.9]  Acute bronchitis [J20.9]  Acute bronchiolitis [J21.9]  Acute on chronic renal failure ([address] [N17.9, N18.9]        Precautions: fall         ASSESSMENT  Based on the objective data described below, the patient presents with generalized weakness and debility. Patient lives in a independent living with 24/7 care giver, uses a wide base quad cane at baseline. Sit to stand CGA, ambulate in the room and around the bed CGA with rolling walker and [facility] noted patient reaching when using the [facility]. Instructed patient and care giver to use rolling walker instead of [facility] for safety then wean off once patient much stronger. Assisted back to the recliner OOB to chair as tolerated performed some active range of motion exercise on both LE all planes. Patient was on room air when arrived and remain on room air during and after Physical Therapy evaluation, oxygen saturation stayed above 94%.  Communicated with nurse who agreed to monitor patient. Current Level of Function Impacting Discharge (mobility/balance): CGA with ambulation using a rolling walker    Functional Outcome Measure: The patient scored 65/100 on the barthel index outcome measure. Other factors to consider for discharge: none     Patient will benefit from skilled therapy intervention to address the above noted impairments. PLAN :  Recommendations and Planned Interventions: bed mobility training, transfer training, gait training, therapeutic exercises, neuromuscular re-education, patient and family training/education, and therapeutic activities      Frequency/Duration: Patient will be followed by physical therapy:  5 times a week to address goals. Recommendation for discharge: (in order for the patient to meet his/her long term goals)  Physical therapy at least 2 days/week in the home     This discharge recommendation:  Has been made in collaboration with the attending provider and/or case management    IF patient discharges home will need the following DME: patient owns DME required for discharge         SUBJECTIVE:   Patient stated ok we can try to walk.     OBJECTIVE DATA SUMMARY:   HISTORY:    Past Medical History:   Diagnosis Date    Arthritis     Breast cancer ([facility]     Cerebral microvascular disease     Dementia ([facility]     Diabetes ([address]     Dyslipidemia     GERD (gastroesophageal reflux disease)     Hearing loss     Hypertension     Hypothyroid     LBBB (left bundle branch block)     Melanoma ([address]     Non-ischemic cardiomyopathy ([address]     Cath 2/16/15 - Mild (non-obstructive) CAD,LVEF 30%, 2+ MR    Skin cancer     melanoma on legs    Sleep apnea     wears O2 at night- cannot tolerate CPAP    Systolic CHF ([address]     Admission 2/13/16     Past Surgical History:   Procedure Laterality Date    HX BREAST LUMPECTOMY  2003    right    HX CATARACT REMOVAL  1994    bilateral    HX CHOLECYSTECTOMY       [facility]    HX HEART CATHETERIZATION      x2- no blockages    HX HEENT  2004    surgery on upper jaw    HX HYSTERECTOMY  1967    HX HYSTEROSCOPY      HX OOPHORECTOMY  1967    left    HX ORTHOPAEDIC  2010    right knee replacement    HX ORTHOPAEDIC      repair torn meniscus left knee    HX ORTHOPAEDIC  1996    repair knuckle on left pinky finger    HX OTHER SURGICAL  2000    remove benign tumore right side between rib and right lung    HX PACEMAKER      HX PARTIAL THYROIDECTOMY      HX UROLOGICAL  2003    bladder surgery x2       Personal factors and/or comorbidities impacting plan of care:          EXAMINATION/PRESENTATION/DECISION MAKING:   Critical Behavior:  Neurologic State: Eyes open spontaneously, Eyes open to voice, Eyes open to pain, Appropriate for age, Confused, Alert  Orientation Level: Oriented to person, Oriented to place, Oriented to situation, Oriented to time  Cognition: Poor safety awareness     Hearing: Auditory  Auditory Impairment: Hard of hearing, bilateral    Range Of Motion:  AROM: Within functional limits           PROM: Within functional limits           Strength:    Strength: Generally decreased, functional                    Tone & Sensation:                                  Coordination:  Coordination: Within functional limits  Vision:      Functional Mobility:  Bed Mobility:  Rolling: (already up on the chair by nurse)           Transfers:  Sit to Stand: Contact guard assistance  Stand to Sit: Contact guard assistance  Stand Pivot Transfers: Contact guard assistance     Bed to Chair: Contact guard assistance              Balance:   Sitting: Intact; With support  Standing: Impaired; With support  Standing - Static: Fair  Standing - Dynamic : Fair  Ambulation/Gait Training:  Distance (ft): 20 Feet (ft)  Assistive Device: Gait belt;Walker, rolling;Cane, quad  Ambulation - Level of Assistance: Contact guard assistance     Gait Description (WDL): Exceptions to WDL  Gait Abnormalities: Path deviations Base of Support: Widened     Speed/Katherine: Slow;Fluctuations                         Therapeutic Exercises:    Instructed patient to continue active range of motion exercise on both legs while up on chair or on bed. Functional Measure:  Barthel Index:    Bathin  Bladder: 10  Bowels: 10  Groomin  Dressing: 10  Feeding: 10  Mobility: 0  Stairs: 0  Toilet Use: 5  Transfer (Bed to Chair and Back): 10  Total: 65/100       The Barthel ADL Index: Guidelines  1. The index should be used as a record of what a patient does, not as a record of what a patient could do. 2. The main aim is to establish degree of independence from any help, physical or verbal, however minor and for whatever reason. 3. The need for supervision renders the patient not independent. 4. A patient's performance should be established using the best available evidence. Asking the patient, friends/relatives and nurses are the usual sources, but direct observation and common sense are also important. However direct testing is not needed. 5. Usually the patient's performance over the preceding 24-48 hours is important, but occasionally longer periods will be relevant. 6. Middle categories imply that the patient supplies over 50 per cent of the effort. 7. Use of aids to be independent is allowed. Simone Parra., Barthel, D.W. (3589). Functional evaluation: the Barthel Index. [facility] (14)2. FAREED Day, Lacho Francois., Katie Nassar., [place], [address] [place] [address] (). Measuring the change indisability after inpatient rehabilitation; comparison of the responsiveness of the Barthel Index and Functional [place] Measure. Journal of Neurology, Neurosurgery, and Psychiatry, 66(4), 649-696. Sade Piña, N.J.A, DAVID Payne, & Yadira Hercules MHarryA. (2004.) Assessment of post-stroke quality of life in cost-effectiveness studies: The usefulness of the Barthel Index and the EuroQoL-5D.  [facility], 13, 008-94 Physical Therapy Evaluation Charge Determination   History Examination Presentation Decision-Making   MEDIUM  Complexity : 1-2 comorbidities / personal factors will impact the outcome/ POC  MEDIUM Complexity : 3 Standardized tests and measures addressing body structure, function, activity limitation and / or participation in recreation  MEDIUM Complexity : Evolving with changing characteristics  Other outcome measures barthel  MEDIUM      Based on the above components, the patient evaluation is determined to be of the following complexity level: MEDIUM    Pain Ratin/10    Activity Tolerance:   Good  Please refer to the flowsheet for vital signs taken during this treatment. After treatment patient left in no apparent distress:   Sitting in chair, Heels elevated for pressure relief, Call bell within reach, and Caregiver / family present    COMMUNICATION/EDUCATION:   The patients plan of care was discussed with: Registered Nurse. Fall prevention education was provided and the patient/caregiver indicated understanding. Thank you for this referral.  Marietta Morrison, PT,WCC.    Time Calculation: 28 mins

## 2020-02-19 NOTE — PROGRESS NOTES
[facility]: MED RECONCILIATION    Comments/Recommendations:   Patient discharged from [facility] earlier today 2/19/20. Patient prescribed furosemide and benzonatate at discharge, prescriptions have not been picked up yet. Patient has not taken any medications since discharging earlier today. Medications added:     none    Medications removed:    none    Medications adjusted:    none    Information obtained from: discharge summary [facility] 2/29/20, [facility] MAR from previous admission     Allergies: Beta-blockers (beta-adrenergic blocking agts); Other medication; Phenobarbital; and Sulfa (sulfonamide antibiotics)    Prior to Admission Medications:     Medication Documentation Review Audit       Reviewed by Lorena Berumen (Pharmacist) on 02/19/20 at 1825      Medication Sig Documenting Provider Last Dose Status Taking?   benzonatate (TESSALON) 100 mg capsule Take 1 Cap by mouth three (3) times daily as needed for Cough for up to 5 days. Kourtney Burdick MD New Rx Active Yes   carvedilol (COREG) 6.25 mg tablet TAKE 1 TABLET TWICE A DAY WITH MEALS Lois Ramos MD 2/19/2020 AM Active Yes   co-enzyme Q-10 (CO Q-10) 50 mg capsule Take 50 mg by mouth daily (after lunch). Provider, Historical  Active Yes   DOCOSAHEXANOIC ACID/EPA (FISH OIL PO) Take 1 Tab by mouth daily (after lunch). Other, MD Christian  Active Yes           Med Note Lalitha Roy   Sat Feb 13, 2016  8:12 AM) . furosemide (LASIX) 20 mg tablet Take 1 Tab by mouth daily for 5 days. Kourtney Burdick MD New Rx Active Yes   insulin glargine (LANTUS U-100 INSULIN) 100 unit/mL injection 22 Units by SubCUTAneous route Daily (before lunch). Provider, Historical 2/19/2020 1142 Active Yes   levothyroxine (SYNTHROID) 50 mcg tablet Take 50 mcg by mouth Daily (before breakfast). Provider, Historical 2/19/2020 AM Active Yes   losartan (COZAAR) 25 mg tablet Take 0.5 Tabs by mouth daily.  Manjit Harris NP  Active Yes   melatonin 10 mg cap Take 10 mg by mouth nightly. Christian Dang MD 2/18/2020 PM Active Yes           Med Note Trinity Balwinder   Sat Feb 13, 2016  8:13 AM) . sertraline (ZOLOFT) 100 mg tablet Take 100 mg by mouth daily. Christian Dang MD 2/19/2020 AM Active Yes           Med Note (Seamus Sparks Aug 29, 2019  1:58 PM)     SITagliptin (JANUVIA) 50 mg tablet Take 50 mg by mouth daily.  Provider, Historical  Active Yes                      Thank Thea James, PharmD, BCPS   Contact: [phone]

## 2020-02-19 NOTE — PROGRESS NOTES
TRANSFER - IN REPORT:    Verbal report received from Hari RN(name) on Glenn Amador  being received from ED(unit) for routine progression of care      Report consisted of patients Situation, Background, Assessment and   Recommendations(SBAR). Information from the following report(s) SBAR, Kardex, ED Summary, Intake/Output, MAR, Recent Results and Cardiac Rhythm NSR was reviewed with the receiving nurse. Opportunity for questions and clarification was provided. Assessment completed upon patients arrival to unit and care assumed. 1930 pt has not arrived to floor at this time. Report given to [facility] RN    Bedside and Verbal shift change report given to [facility] (oncoming nurse) by Juma Mclaughlin (offgoing nurse). Report included the following information SBAR, Kardex, Intake/Output, MAR and Recent Results.

## 2020-02-19 NOTE — PROGRESS NOTES
Bedside shift change report given to VIC Marshall (oncoming nurse) by Johann Melton RN (offgoing nurse). Report included the following information SBAR, Kardex, MAR, Accordion and Recent Results.

## 2020-02-19 NOTE — PROGRESS NOTES
Bedside and Verbal shift change report given to VIC Hernandez (oncoming nurse) by Chasidy Cruz RN (offgoing nurse). Report included the following information SBAR, Kardex, MAR and Accordion.

## 2020-02-19 NOTE — H&P
[facility]  [address], [facility] [address]  [phone]    Admission History and Physical      NAME:       Kiya Combs   :       1928   MRN:      [id number]     PCP:      Alexi Gao MD     Date of service:   2020     Chief  Complaint:  SOB     History Of Presenting Illness:       Ms. Ivelisse Arndt is a 80 y.o. female who is being admitted for acute on chronic systolic CHF (congestive heart failure) with a suspected viral pneumonia. Ms. Ivelisse Arntd presented to our Emergency Department today complaining of a persistent and worsening SOB. She had just been discharged from hospital and apparently felt more SOB. During her admission, she was found to have a metapneumoviral infection, likely bronchitis due to her cough as CXR had been unremarkable. She had also been intermittently confused. In the ED, a CTA chest done showed no acute pulmonary embolus. Patchy bilateral airspace disease right greater than left. 3. 6 mm left lower lobe pulmonary nodule essentially unchanged. Her pro BNP was almost 35,000. She remains SOB at the time of review. Troponin done in the ED was 0.3. She is a poor historian and I have discussed with her caregiver as well as the ED physician for collaborative hx. She will be admitted for further management. Allergies   Allergen Reactions    Beta-Blockers (Beta-Adrenergic Blocking Agts) Unknown (comments)     Made her feel tired, breakout on her back    Other Medication Unknown (comments)     Bromides    Phenobarbital Unknown (comments)    Sulfa (Sulfonamide Antibiotics) Swelling     rash       Prior to Admission medications    Medication Sig Start Date End Date Taking? Authorizing Provider   benzonatate (TESSALON) 100 mg capsule Take 1 Cap by mouth three (3) times daily as needed for Cough for up to 5 days.  20 Yes Catherine Fischer MD   furosemide (LASIX) 20 mg tablet Take 1 Tab by mouth daily for 5 days. 2/19/20 2/24/20 Yes Catherine Fischer MD   losartan (COZAAR) 25 mg tablet Take 0.5 Tabs by mouth daily. 1/23/20  Yes Jin Izquierdo NP   levothyroxine (SYNTHROID) 50 mcg tablet Take 50 mcg by mouth Daily (before breakfast). Yes Provider, Historical   insulin glargine (LANTUS U-100 INSULIN) 100 unit/mL injection 22 Units by SubCUTAneous route Daily (before lunch). Yes Provider, Historical   SITagliptin (JANUVIA) 50 mg tablet Take 50 mg by mouth daily. Yes Provider, Historical   carvedilol (COREG) 6.25 mg tablet TAKE 1 TABLET TWICE A DAY WITH MEALS 4/26/19  Yes Ernesto Harley MD   co-enzyme Q-10 (CO Q-10) 50 mg capsule Take 50 mg by mouth daily (after lunch). Yes Provider, Historical   sertraline (ZOLOFT) 100 mg tablet Take 100 mg by mouth daily. Yes Other, MD Christian   melatonin 10 mg cap Take 10 mg by mouth nightly. Yes Laurence, MD Christian   DOCOSAHEXANOIC ACID/EPA (FISH OIL PO) Take 1 Tab by mouth daily (after lunch).    Yes Other, MD Christian       Past Medical History:   Diagnosis Date    Arthritis     Breast cancer ([address]     Cerebral microvascular disease     Dementia ([address]     Diabetes ([address]     Dyslipidemia     GERD (gastroesophageal reflux disease)     Hearing loss     Hypertension     Hypothyroid     LBBB (left bundle branch block)     Melanoma ([address]     Non-ischemic cardiomyopathy ([address]     Cath 2/16/15 - Mild (non-obstructive) CAD,LVEF 30%, 2+ MR    Skin cancer     melanoma on legs    Sleep apnea     wears O2 at night- cannot tolerate CPAP    Systolic CHF ([address]     Admission 2/13/16        Past Surgical History:   Procedure Laterality Date    HX BREAST LUMPECTOMY  2003    right    HX CATARACT REMOVAL  1994    bilateral    HX CHOLECYSTECTOMY      HX DILATION AND CURETTAGE  1960    HX HEART CATHETERIZATION      x2- no blockages    HX HEENT  2004    surgery on upper jaw    HX HYSTERECTOMY  1967    HX HYSTEROSCOPY      HX OOPHORECTOMY  1967    left    HX ORTHOPAEDIC  2010    right knee replacement    HX ORTHOPAEDIC      repair torn meniscus left knee    HX ORTHOPAEDIC  1996    repair knuckle on left pinky finger    HX OTHER SURGICAL  2000    remove benign tumore right side between rib and right lung    HX PACEMAKER      HX PARTIAL THYROIDECTOMY      HX UROLOGICAL  2003    bladder surgery x2       Social History     Tobacco Use    Smoking status: Never Smoker    Smokeless tobacco: Never Used   Substance Use Topics    Alcohol use: Yes     Alcohol/week: 4.0 standard drinks     Types: 3 Glasses of wine, 1 Shots of liquor per week        Family History   Problem Relation Age of Onset    Stroke Mother         Review of Systems:    Constitutional ROS: no fever, chills, rigors or night sweats  Respiratory ROS: + cough, + sputum, - hemoptysis but + dyspnea   Cardiovascular ROS: no chest pain, palpitations, orthopnea, PND or syncope  Endocrine ROS: no polydispsia, polyuria, heat or cold intolerance or major weight change. Gastrointestinal ROS: no dysphagia, abdominal pain, nausea, vomiting or diarrhea    Genito-Urinary ROS: no dysuria, frequency, hematuria, retention or flank pain  Musculoskeletal ROS: no joint pain, swelling or muscular tenderness  Neurological ROS: no headache but intermittent confusion No focal weakness or any other neurological symptoms  Psychiatric ROS: no depression, anxiety, mood swings  Dermatological ROS: no rash, pruritis, or urticaria  Heme-Lymph ROS: no swollen glands, bleeding    Examination:    Constitutional:    Visit Vitals  /59   Pulse 88   Temp 98.8 °F (37.1 °C)   Resp 20   Ht 5' 5\" (1.651 m)   Wt 82.8 kg (182 lb 8.7 oz)   SpO2 92%   BMI 30.38 kg/m²         General:  Weak and ill looking patient in no acute distress  Eyes: Pink conjunctivae, PERRLA with no discharge.  Normal eye movements  Ear, Nose, Mouth & Throat: No ottorrhea, rhinorrhea, non tender sinuses, dry mucous membranes  Respiratory:  No accessory muscle use, decreased breath sounds with bilateral crackles. No wheezes  Cardiovascular:  No JVD or murmurs, regular and normal S1, S2 without thrills, bruits. + peripheral edema. GI & :  Soft abdomen, non-tender, non-distended, normoactive bowel sounds with no palpable organomegaly  Heme:  No cervical or axillary adenopathy. Musculoskeletal:  No cyanosis, clubbing, atrophy or deformities  Skin:  No rashes, bruising or ulcers   Neurological: Awake and alert, speech is clear, slightly confused. Not restless. Otherwise non focal   Psychiatric:  Has little insight to her illness   ________________________________________________________________________    Data Review:    Labs:    Recent Labs     02/19/20  1600   WBC 6.0   HGB 7.9*   HCT 25.4*        Recent Labs     02/19/20  1600   *   K 4.9      CO2 19*   *   BUN 50*   CREA 1.72*   CA 8.4*   ALB 3.1*   SGOT 54*   ALT 55     No components found for: GLPOC  No results for input(s): PH, PCO2, PO2, HCO3, FIO2 in the last 72 hours. Recent Labs     02/17/20  1342   INR 1.1       Imaging Studies:      Chest X-ray and CTA chest  - reviewed     Other Medical tests:    Personally reviewed 12 lead EKG - paced rhythm     I have also reviewed available old medical records.      Assessment & Impression:     Ms. Ofe Lehman is a 80 y.o. female being evaluated for:     Principal Problem:    Acute on chronic systolic CHF (congestive heart failure) ([facility] (2/19/2020)    Active Problems:    HTN (hypertension), benign ()      Biventricular automatic implantable cardioverter defibrillator in situ (1/3/2018)      Type 2 diabetes mellitus with nephropathy ([facility] (1/3/2018)      Macrocytic anemia (7/31/2019)      Hypothyroidism ()      Non-ischemic cardiomyopathy ([facility] ()      Overview: Cath 2/16/15 - Mild (non-obstructive) CAD,LVEF 30%, 2+ MR      Dementia (HCC) ()      Hyponatremia with extracellular fluid depletion (2/17/2020)      Pneumonia (2/19/2020)      Pneumonia due to human metapneumovirus (2/19/2020)      CKD (chronic kidney disease) stage 3, GFR 30-59 ml/min ([facility]) (2/19/2020)         Plan of management:    Acute on chronic systolic CHF (congestive heart failure) ([facility] (2/19/2020) / Non-ischemic cardiomyopathy ([facility] /  Biventricular automatic implantable cardioverter defibrillator in situ (1/3/2018): admit to hospital. Trigger likely her current respiratory illness. Troponin 0.3 likely partly from her renal disease. Last Echo 12/2019 EF 45- 50%. Admit to hospital. IV Bumex. Trend cardiac enzymes. Consult cardiology for any further recommendations    Pneumonia due to human metapneumovirus (2/19/2020): has been having cough with confirmed viral etiology. CTA suggests airspace disease. Doubt bacterial. However, start empiric IV cefepime and levofloxacin and de-escalate soon    HTN (hypertension), benign: BP stable. Will hold any BP medications for now     Type 2 diabetes mellitus with nephropathy ([facility] (1/3/2018): DM diet, SSi per protocol. Resume lantus    Hypothyroidism POA: recent TSH 0.79. Resume Levothyroxine    Dementia ([facility]): presumed based on recent neurology review. Out patient follow up for neuropsychological evaluation    Hyponatremia with extracellular fluid depletion (2/17/2020): likely from diuretic use. Monitor    CKD (chronic kidney disease) stage 3, GFR 30-59 ml/min ([facility] (2/19/2020): watch renal function. She received IV contrast and will be on diuretics     Macrocytic anemia (7/31/2019):  Monitor Hgb    Code Status:  DNR - ED physician discussed with daughter    Surrogate decision maker: Family    Risk of deterioration: high      Total time spent for the care of the patient: [address] discussed with: Patient, Family, Nursing Staff and ED physician    Discussed:  Code Status, Care Plan and D/C Planning    Prophylaxis:  Hep SQ    Probable Disposition:  HH PT, OT, RN           ___________________________________________________    Attending Physician: Fuentes Davis MD

## 2020-02-19 NOTE — ED TRIAGE NOTES
Pt arrives via EMS from home w/ c/c of SOB. Pt just d/c from [facility] 1h ago. 24h caretaker left home to p/u d/c medications, house cleaning staff came in house & noticed pt was SOB & called 911. Caretaker @ bedside stated this is the condition pt was d/c from hospital in & no changes have occurred.

## 2020-02-19 NOTE — ED NOTES
TRANSFER - OUT REPORT:    Verbal report given to Norah Barba (name) on Nemo Todd  being transferred to [facility] (unit) for routine progression of care       Report consisted of patients Situation, Background, Assessment and   Recommendations(SBAR). Information from the following report(s) SBAR, ED Summary, [facility] and Recent Results was reviewed with the receiving nurse. Lines:   Peripheral IV 02/19/20 Left Forearm (Active)   Site Assessment Clean, dry, & intact 2/19/2020  4:01 PM   Phlebitis Assessment 0 2/19/2020  4:01 PM   Infiltration Assessment 0 2/19/2020  4:01 PM   Dressing Type Tape;Transparent 2/19/2020  4:01 PM   Hub Color/Line Status Blue;Flushed;Patent 2/19/2020  4:01 PM   Action Taken Blood drawn 2/19/2020  4:01 PM        Opportunity for questions and clarification was provided.       Patient transported with:   Monitor  Registered Nurse

## 2020-02-19 NOTE — ED PROVIDER NOTES
Please note that this dictation was completed with [employer], the computer voice recognition software.  Quite often unanticipated grammatical, syntax, homophones, and other interpretive errors are inadvertently transcribed by the computer software.  Please disregard these errors.  Please excuse any errors that have escaped final proofreading. 59-year-old white female past medical history arthritis, breast cancer, cerebral microvascular disease, dementia, diabetes, dyslipidemia, GERD, hearing loss, hypertension, hypothyroid, left bundle branch block, melanoma, nonischemic cardiomyopathy with EF of 30% and mitral regurg, sleep apnea wears O2 at night, and recently admitted 217 and discharged on 218 for an acute CHF exacerbation presents today complaining of \"been having chest pain x3 days\" the initial triage report noticed she was short of breath EMS reports she was short of breath. Patient states not short of breath that was chest pain. She denies productive cough she states she has been eating and drinking normally going to the bathroom normally no dizziness no facial droop no slurred speech no numbness or tingling no vision changes. States she has been taking her medications as prescribed. She presents with her home health nurse who confirms this. The home health nurse does had though that her cough seems worse\" she seems a little worse than she was. \"    pt denies HA, vison changes, diff swallowing, CP, SOB, Abd pain, F/Ch, N/V, D/Cons or other current systemic complaints    Social/ PSH reviewed in EMR    EMR Chart Reviewed           Past Medical History:   Diagnosis Date    Arthritis     Breast cancer ([address]     Cerebral microvascular disease     Dementia ([address]     Diabetes ([address]     Dyslipidemia     GERD (gastroesophageal reflux disease)     Hearing loss     Hypertension     Hypothyroid     LBBB (left bundle branch block)     Melanoma ([address]     Non-ischemic cardiomyopathy ([address]     Cath 2/16/15 - Mild (non-obstructive) CAD,LVEF 30%, 2+ MR    Skin cancer     melanoma on legs    Sleep apnea     wears O2 at night- cannot tolerate CPAP    Systolic CHF [address] [address]     Admission 2/13/16       Past Surgical History:   Procedure Laterality Date    HX BREAST LUMPECTOMY  2003    right    HX CATARACT REMOVAL  1994    bilateral    HX CHOLECYSTECTOMY      HX DILATION AND CURETTAGE  1960    HX HEART CATHETERIZATION      x2- no blockages    HX HEENT  2004    surgery on upper jaw    HX HYSTERECTOMY  1967    HX HYSTEROSCOPY      HX OOPHORECTOMY  1967    left    HX ORTHOPAEDIC  2010    right knee replacement    HX ORTHOPAEDIC      repair torn meniscus left knee    HX ORTHOPAEDIC  1996    repair knuckle on left pinky finger    HX OTHER SURGICAL  2000    remove benign tumore right side between rib and right lung    HX PACEMAKER      HX PARTIAL THYROIDECTOMY      HX UROLOGICAL  2003    bladder surgery x2         Family History:   Problem Relation Age of Onset    Stroke Mother        Social History     Socioeconomic History    Marital status:      Spouse name: Not on file    Number of children: Not on file    Years of education: Not on file    Highest education level: Not on file   Occupational History    Not on file   Social Needs    Financial resource strain: Not on file    Food insecurity:     Worry: Not on file     Inability: Not on file    Transportation needs:     Medical: Not on file     Non-medical: Not on file   Tobacco Use    Smoking status: Never Smoker    Smokeless tobacco: Never Used   Substance and Sexual Activity    Alcohol use:  Yes     Alcohol/week: 4.0 standard drinks     Types: 3 Glasses of wine, 1 Shots of liquor per week    Drug use: No    Sexual activity: Never   Lifestyle    Physical activity:     Days per week: Not on file     Minutes per session: Not on file    Stress: Not on file   Relationships    Social connections:     Talks on phone: Not on file     Gets together: Not on file     Attends Congregational service: Not on file     Active member of club or organization: Not on file     Attends meetings of clubs or organizations: Not on file     Relationship status: Not on file    Intimate partner violence:     Fear of current or ex partner: Not on file     Emotionally abused: Not on file     Physically abused: Not on file     Forced sexual activity: Not on file   Other Topics Concern    Not on file   Social History Narrative    Not on file         ALLERGIES: Beta-blockers (beta-adrenergic blocking agts); Other medication; Phenobarbital; and Sulfa (sulfonamide antibiotics)    Review of Systems   Constitutional: Negative for appetite change, chills and fever. HENT: Negative for drooling, trouble swallowing and voice change. Respiratory: Positive for chest tightness and shortness of breath. Cardiovascular: Positive for chest pain. Negative for leg swelling. Gastrointestinal: Negative for abdominal pain, diarrhea, nausea and vomiting. Genitourinary: Negative for dysuria. Musculoskeletal: Negative for back pain. Skin: Negative for rash. Neurological: Negative for seizures, facial asymmetry, speech difficulty, light-headedness and headaches. All other systems reviewed and are negative. Vitals:    02/20/20 0700 02/20/20 0748 02/20/20 1111 02/20/20 1129   BP:  135/80 139/77 139/77   Pulse: 83 86 88    Resp:  20 20    Temp:  98.3 °F (36.8 °C) 97.7 °F (36.5 °C)    SpO2:  97% 100%    Weight:    84.4 kg (186 lb)   Height:    5' 5\" (1.651 m)            Physical Exam  Vitals signs and nursing note reviewed. Constitutional:       General: She is not in acute distress. Appearance: Normal appearance. She is well-developed. She is obese. She is not ill-appearing, toxic-appearing or diaphoretic. Comments: NAD, AxOx4, speaking in complete sentences    gcs = 14 (memory)   HENT:      Head: Normocephalic and atraumatic.       Right Ear: External ear normal.      Left Ear: External ear normal.      Nose: Nose normal.      Mouth/Throat:      Mouth: Mucous membranes are moist.   Eyes:      General: No scleral icterus. Right eye: No discharge. Conjunctiva/sclera: Conjunctivae normal.      Pupils: Pupils are equal, round, and reactive to light. Neck:      Musculoskeletal: Normal range of motion and neck supple. No neck rigidity. Vascular: No JVD. Trachea: No tracheal deviation. Cardiovascular:      Rate and Rhythm: Normal rate and regular rhythm. Pulses: Normal pulses. Heart sounds: Normal heart sounds. No murmur. No friction rub. No gallop. Pulmonary:      Effort: Pulmonary effort is normal. No respiratory distress. Breath sounds: No stridor. Rhonchi present. No wheezing or rales. Comments: Noted coarse BS/ coarse cough; Chest:      Chest wall: No tenderness. Abdominal:      General: Bowel sounds are normal.      Palpations: Abdomen is soft. Tenderness: There is no abdominal tenderness. There is no guarding or rebound. Hernia: No hernia is present. Comments: nttp     Genitourinary:     Vagina: No vaginal discharge. Comments: Pt denies urinary/ vaginal complaints  Musculoskeletal: Normal range of motion. General: No swelling, tenderness, deformity or signs of injury. Right lower leg: No edema. Left lower leg: No edema. Lymphadenopathy:      Cervical: No cervical adenopathy. Skin:     General: Skin is warm and dry. Capillary Refill: Capillary refill takes less than 2 seconds. Coloration: Skin is not jaundiced or pale. Findings: No bruising, erythema, lesion or rash. Neurological:      General: No focal deficit present. Mental Status: She is alert. Mental status is at baseline. Cranial Nerves: No cranial nerve deficit. Motor: No weakness or abnormal muscle tone.       Coordination: Coordination normal.      Gait: Gait abnormal.   Psychiatric:         Behavior: Behavior normal.         Thought Content: Thought content normal.          MDM  Number of Diagnoses or Management Options  Acute congestive heart failure, unspecified heart failure type [facility]):   DNR (do not resuscitate): Hyponatremia:   Troponin level elevated:   Risk of Complications, Morbidity, and/or Mortality  Presenting problems: high  Diagnostic procedures: moderate  Management options: moderate    Critical Care  Total time providing critical care: 30-74 minutes (45 min)    Patient Progress  Patient progress: improved         Procedures    Chief Complaint   Patient presents with    Shortness of Breath       3:43 PM  The patients presenting problems have been discussed, and they are in agreement with the care plan formulated and outlined with them. I have encouraged them to ask questions as they arise throughout their visit. MEDICATIONS GIVEN:  Medications - No data to display    LABS REVIEWED:  Labs Reviewed   URINE CULTURE HOLD SAMPLE   CULTURE, BLOOD, PAIRED   TROPONIN I   METABOLIC PANEL, COMPREHENSIVE   CBC WITH AUTOMATED DIFF   URINALYSIS W/MICROSCOPIC   LACTIC ACID   NT-PRO BNP   SAMPLES BEING HELD       RADIOLOGY RESULTS:  The following have been ordered and reviewed:  _____________________________________________________________________  _____________________________________________________________________    EKG interpretation:   Rhythm: paced  rhythm; and regular . Rate (approx.): 82; Axis: normal; P wave: normal; QRS interval: normal ; ST/T wave: normal; Negative acute significant segmental elevations/ unchanged compared to study dated 02/17/2020    PROCEDURES:        CONSULTATIONS:       PROGRESS NOTES:      DIAGNOSIS:    1. Acute congestive heart failure, unspecified heart failure type ([address]    2. Troponin level elevated    3. Hyponatremia    4.  DNR (do not resuscitate)        PLAN:  1-CAP - levaquin;   2 CHF - lasix;   3 hyponatremia - monitor;   4 DNR per daughter, her POA;       ED COURSE: The patients hospital course has been uncomplicated. 3:56 PM  Caregiver admits 'had to leave her to go get her medicine/ when I got back the cleaning crew had called 911'; Pt just discharged this am from here;     4:33 PM  Case Managemant eusebia with Daughter in [place]; 'Palomo Garcia to discharge her home if everything is OK';     5:35 PM  I have made the Pt a DNR based on the wishes of her POA her daughter, who was kindly contacted by SAUL; Hospitalist Sierra Serve for Admission  5:39 PM    ED Room Number: ER17/17  Patient Name and age:  Marilee East 80 y.o.  female  Working Diagnosis:   1. Acute congestive heart failure, unspecified heart failure type ([address]    2. Troponin level elevated    3. Hyponatremia    4.  DNR (do not resuscitate)      Readmission: yes  Isolation Requirements:  no  Recommended Level of Care:  Comfort care  Code Status:  DNR per Daughter  Department:[facility] ED - [phone]  Other:  CAP-  started levaquinWorsening CHF - given lasix/ now also elevated troponin/ hyponatremia;

## 2020-02-20 ENCOUNTER — APPOINTMENT (OUTPATIENT)
Dept: NON INVASIVE DIAGNOSTICS | Age: 85
DRG: 202 | End: 2020-02-20
Attending: SPECIALIST
Payer: MEDICARE

## 2020-02-20 ENCOUNTER — APPOINTMENT (OUTPATIENT)
Dept: GENERAL RADIOLOGY | Age: 85
DRG: 202 | End: 2020-02-20
Attending: HOSPITALIST
Payer: MEDICARE

## 2020-02-20 LAB
ALBUMIN SERPL-MCNC: 2.9 G/DL (ref 3.5–5)
ALBUMIN/GLOB SERPL: 0.8 {RATIO} (ref 1.1–2.2)
ALP SERPL-CCNC: 50 U/L (ref 45–117)
ALT SERPL-CCNC: 57 U/L (ref 12–78)
ANION GAP SERPL CALC-SCNC: 12 MMOL/L (ref 5–15)
AST SERPL-CCNC: 37 U/L (ref 15–37)
BASOPHILS # BLD: 0 K/UL (ref 0–0.1)
BASOPHILS NFR BLD: 1 % (ref 0–1)
BILIRUB SERPL-MCNC: 0.4 MG/DL (ref 0.2–1)
BUN SERPL-MCNC: 47 MG/DL (ref 6–20)
BUN/CREAT SERPL: 30 (ref 12–20)
CALCIUM SERPL-MCNC: 8.1 MG/DL (ref 8.5–10.1)
CHLORIDE SERPL-SCNC: 100 MMOL/L (ref 97–108)
CO2 SERPL-SCNC: 19 MMOL/L (ref 21–32)
CREAT SERPL-MCNC: 1.58 MG/DL (ref 0.55–1.02)
DIFFERENTIAL METHOD BLD: ABNORMAL
ECHO LV EDV A2C: 50.1 ML
ECHO LV EDV A4C: 81.1 ML
ECHO LV EDV BP: 63.6 ML (ref 56–104)
ECHO LV EDV INDEX A4C: 42.3 ML/M2
ECHO LV EDV INDEX BP: 33.2 ML/M2
ECHO LV EDV NDEX A2C: 26.1 ML/M2
ECHO LV EJECTION FRACTION A2C: 26 %
ECHO LV EJECTION FRACTION A4C: 42 %
ECHO LV ESV A2C: 37.3 ML
ECHO LV ESV A4C: 47.1 ML
ECHO LV ESV BP: 41.8 ML (ref 19–49)
ECHO LV ESV INDEX A2C: 19.4 ML/M2
ECHO LV ESV INDEX A4C: 24.6 ML/M2
ECHO LV ESV INDEX BP: 21.8 ML/M2
ECHO LV INTERNAL DIMENSION DIASTOLIC: 4.58 CM (ref 3.9–5.3)
ECHO LV INTERNAL DIMENSION SYSTOLIC: 3.75 CM
ECHO LV IVSD: 0.89 CM (ref 0.6–0.9)
ECHO LV MASS 2D: 126.8 G (ref 67–162)
ECHO LV MASS INDEX 2D: 66.1 G/M2 (ref 43–95)
ECHO LV POSTERIOR WALL DIASTOLIC: 0.67 CM (ref 0.6–0.9)
EOSINOPHIL # BLD: 0.1 K/UL (ref 0–0.4)
EOSINOPHIL NFR BLD: 2 % (ref 0–7)
ERYTHROCYTE [DISTWIDTH] IN BLOOD BY AUTOMATED COUNT: 15.4 % (ref 11.5–14.5)
GLOBULIN SER CALC-MCNC: 3.5 G/DL (ref 2–4)
GLUCOSE BLD STRIP.AUTO-MCNC: 130 MG/DL (ref 65–100)
GLUCOSE BLD STRIP.AUTO-MCNC: 205 MG/DL (ref 65–100)
GLUCOSE BLD STRIP.AUTO-MCNC: 214 MG/DL (ref 65–100)
GLUCOSE BLD STRIP.AUTO-MCNC: 93 MG/DL (ref 65–100)
GLUCOSE SERPL-MCNC: 88 MG/DL (ref 65–100)
HCT VFR BLD AUTO: 22.9 % (ref 35–47)
HGB BLD-MCNC: 7.6 G/DL (ref 11.5–16)
IMM GRANULOCYTES # BLD AUTO: 0 K/UL (ref 0–0.04)
IMM GRANULOCYTES NFR BLD AUTO: 1 % (ref 0–0.5)
LVFS 2D: 18.1 %
LYMPHOCYTES # BLD: 0.7 K/UL (ref 0.8–3.5)
LYMPHOCYTES NFR BLD: 21 % (ref 12–49)
MAGNESIUM SERPL-MCNC: 2 MG/DL (ref 1.6–2.4)
MCH RBC QN AUTO: 37.4 PG (ref 26–34)
MCHC RBC AUTO-ENTMCNC: 33.2 G/DL (ref 30–36.5)
MCV RBC AUTO: 112.8 FL (ref 80–99)
MONOCYTES # BLD: 0.3 K/UL (ref 0–1)
MONOCYTES NFR BLD: 10 % (ref 5–13)
NEUTS SEG # BLD: 2.2 K/UL (ref 1.8–8)
NEUTS SEG NFR BLD: 65 % (ref 32–75)
NRBC # BLD: 0 K/UL (ref 0–0.01)
NRBC BLD-RTO: 0 PER 100 WBC
PLATELET # BLD AUTO: 258 K/UL (ref 150–400)
PMV BLD AUTO: 11.5 FL (ref 8.9–12.9)
POTASSIUM SERPL-SCNC: 3.5 MMOL/L (ref 3.5–5.1)
POTASSIUM SERPL-SCNC: 4 MMOL/L (ref 3.5–5.1)
PROT SERPL-MCNC: 6.4 G/DL (ref 6.4–8.2)
RBC # BLD AUTO: 2.03 M/UL (ref 3.8–5.2)
RBC MORPH BLD: ABNORMAL
SERVICE CMNT-IMP: ABNORMAL
SERVICE CMNT-IMP: NORMAL
SODIUM SERPL-SCNC: 131 MMOL/L (ref 136–145)
TROPONIN I SERPL-MCNC: 0.22 NG/ML
WBC # BLD AUTO: 3.3 K/UL (ref 3.6–11)

## 2020-02-20 PROCEDURE — 77030038269 HC DRN EXT URIN PURWCK BARD -A

## 2020-02-20 PROCEDURE — 80053 COMPREHEN METABOLIC PANEL: CPT

## 2020-02-20 PROCEDURE — 74011250636 HC RX REV CODE- 250/636: Performed by: INTERNAL MEDICINE

## 2020-02-20 PROCEDURE — 84132 ASSAY OF SERUM POTASSIUM: CPT

## 2020-02-20 PROCEDURE — 65660000000 HC RM CCU STEPDOWN

## 2020-02-20 PROCEDURE — 83735 ASSAY OF MAGNESIUM: CPT

## 2020-02-20 PROCEDURE — 74011250636 HC RX REV CODE- 250/636: Performed by: HOSPITALIST

## 2020-02-20 PROCEDURE — 74011000258 HC RX REV CODE- 258: Performed by: INTERNAL MEDICINE

## 2020-02-20 PROCEDURE — 77010033678 HC OXYGEN DAILY

## 2020-02-20 PROCEDURE — 74011000250 HC RX REV CODE- 250: Performed by: INTERNAL MEDICINE

## 2020-02-20 PROCEDURE — 74011636637 HC RX REV CODE- 636/637: Performed by: INTERNAL MEDICINE

## 2020-02-20 PROCEDURE — 93308 TTE F-UP OR LMTD: CPT

## 2020-02-20 PROCEDURE — 74011000250 HC RX REV CODE- 250: Performed by: HOSPITALIST

## 2020-02-20 PROCEDURE — 94760 N-INVAS EAR/PLS OXIMETRY 1: CPT

## 2020-02-20 PROCEDURE — 71046 X-RAY EXAM CHEST 2 VIEWS: CPT

## 2020-02-20 PROCEDURE — 84484 ASSAY OF TROPONIN QUANT: CPT

## 2020-02-20 PROCEDURE — 74011250637 HC RX REV CODE- 250/637: Performed by: HOSPITALIST

## 2020-02-20 PROCEDURE — 85025 COMPLETE CBC W/AUTO DIFF WBC: CPT

## 2020-02-20 PROCEDURE — 74011250637 HC RX REV CODE- 250/637: Performed by: INTERNAL MEDICINE

## 2020-02-20 PROCEDURE — 82962 GLUCOSE BLOOD TEST: CPT

## 2020-02-20 PROCEDURE — 36415 COLL VENOUS BLD VENIPUNCTURE: CPT

## 2020-02-20 PROCEDURE — 65270000029 HC RM PRIVATE

## 2020-02-20 PROCEDURE — 94640 AIRWAY INHALATION TREATMENT: CPT

## 2020-02-20 RX ORDER — LORAZEPAM 2 MG/ML
0.5 INJECTION INTRAMUSCULAR
Status: DISCONTINUED | OUTPATIENT
Start: 2020-02-20 | End: 2020-02-20

## 2020-02-20 RX ORDER — IPRATROPIUM BROMIDE AND ALBUTEROL SULFATE 2.5; .5 MG/3ML; MG/3ML
3 SOLUTION RESPIRATORY (INHALATION)
Status: DISCONTINUED | OUTPATIENT
Start: 2020-02-20 | End: 2020-02-20

## 2020-02-20 RX ORDER — LEVALBUTEROL INHALATION SOLUTION 0.63 MG/3ML
0.63 SOLUTION RESPIRATORY (INHALATION)
Status: DISCONTINUED | OUTPATIENT
Start: 2020-02-20 | End: 2020-02-22 | Stop reason: HOSPADM

## 2020-02-20 RX ADMIN — LEVALBUTEROL HYDROCHLORIDE 0.63 MG: 0.63 SOLUTION RESPIRATORY (INHALATION) at 23:17

## 2020-02-20 RX ADMIN — POTASSIUM BICARBONATE 40 MEQ: 782 TABLET, EFFERVESCENT ORAL at 13:10

## 2020-02-20 RX ADMIN — LEVALBUTEROL HYDROCHLORIDE 0.63 MG: 0.63 SOLUTION RESPIRATORY (INHALATION) at 19:33

## 2020-02-20 RX ADMIN — BUMETANIDE 1 MG: 0.25 INJECTION INTRAMUSCULAR; INTRAVENOUS at 09:06

## 2020-02-20 RX ADMIN — LEVOTHYROXINE SODIUM 50 MCG: 0.05 TABLET ORAL at 09:06

## 2020-02-20 RX ADMIN — INSULIN LISPRO 3 UNITS: 100 INJECTION, SOLUTION INTRAVENOUS; SUBCUTANEOUS at 17:33

## 2020-02-20 RX ADMIN — CEFEPIME HYDROCHLORIDE 2 G: 2 INJECTION, POWDER, FOR SOLUTION INTRAVENOUS at 17:32

## 2020-02-20 RX ADMIN — LEVALBUTEROL HYDROCHLORIDE 0.63 MG: 0.63 SOLUTION RESPIRATORY (INHALATION) at 13:26

## 2020-02-20 RX ADMIN — LOSARTAN POTASSIUM 12.5 MG: 25 TABLET, FILM COATED ORAL at 09:06

## 2020-02-20 RX ADMIN — LEVALBUTEROL HYDROCHLORIDE 0.63 MG: 0.63 SOLUTION RESPIRATORY (INHALATION) at 15:22

## 2020-02-20 RX ADMIN — MELATONIN TAB 3 MG 10.5 MG: 3 TAB at 22:47

## 2020-02-20 RX ADMIN — INSULIN LISPRO 2 UNITS: 100 INJECTION, SOLUTION INTRAVENOUS; SUBCUTANEOUS at 22:46

## 2020-02-20 RX ADMIN — Medication 10 ML: at 21:37

## 2020-02-20 RX ADMIN — POTASSIUM BICARBONATE 40 MEQ: 782 TABLET, EFFERVESCENT ORAL at 17:34

## 2020-02-20 RX ADMIN — BUMETANIDE 1 MG: 0.25 INJECTION INTRAMUSCULAR; INTRAVENOUS at 17:34

## 2020-02-20 RX ADMIN — ENOXAPARIN SODIUM 30 MG: 30 INJECTION SUBCUTANEOUS at 21:29

## 2020-02-20 RX ADMIN — BENZONATATE 100 MG: 100 CAPSULE ORAL at 23:11

## 2020-02-20 RX ADMIN — METHYLPREDNISOLONE SODIUM SUCCINATE 40 MG: 40 INJECTION, POWDER, FOR SOLUTION INTRAMUSCULAR; INTRAVENOUS at 21:28

## 2020-02-20 RX ADMIN — SERTRALINE HYDROCHLORIDE 100 MG: 50 TABLET ORAL at 09:06

## 2020-02-20 RX ADMIN — METHYLPREDNISOLONE SODIUM SUCCINATE 40 MG: 40 INJECTION, POWDER, FOR SOLUTION INTRAMUSCULAR; INTRAVENOUS at 12:13

## 2020-02-20 NOTE — CONSULTS
Segundo Monsivais MD [facility] [address]  Office [phone]  Mobile [phone]    Date of  Admission: 2/19/2020  3:04 PM  PCP- Kana Rudea MD    Wil Adamson is a 80 y.o. female admitted for Acute systolic CHF (congestive heart failure) ([facility] [address] [I50.21]. Consult requested by Marilee Whyte MD    Assessment  · Acute on chronic HFrEF  · Recent metapneumonvirus resp infection complicated by poss PNA  · Hx NICM s/p CRT with residual mild LV dysfunction  · Renal insufficiency with mild hyponatremia  · Likely cardiorenal  · Anemia of chronic disease  · Frail        Discussion/Recommendations:  Complex case with multiple comorbid conditions  Just discharged/readmitted yesterday  Marked interval increase in NTproBNP  Exam and Xray c/w ADHF  Iv loops  Reassess LVEF    Subjective:  Patient well known to our practice, followed by Dr Cassandra To. This is third hospitalization in 2 months. Just discharged yesterday after bronchitis with metapneumonvirus, readmitted same day with progressive dyspnea. ED evaluation with chest CT without PE but suspicious for PNA. Marked elevation in NT proBNP c/w previous numbers. Low grade flat troponin elevation noted. EKG with ventricular paced rhythm. She feels better today but her voice is quite hoarse and raspy. Wearing oxygen. No chest pain. She is only a fair historian.  Lives in [facility] independent living but has 24 hour nursing care due to fall risk, etc.    Echo Dec 2019 - EF 45%      Past Medical History:   Diagnosis Date    Arthritis     Breast cancer ([address]     Cerebral microvascular disease     Dementia ([address]     Diabetes ([address]     Dyslipidemia     GERD (gastroesophageal reflux disease)     Hearing loss     Hypertension     Hypothyroid     LBBB (left bundle branch block)     Melanoma ([address]     Non-ischemic cardiomyopathy ([address]     Cath 2/16/15 - Mild (non-obstructive) CAD,LVEF 30%, 2+ MR    Skin cancer melanoma on legs    Sleep apnea     wears O2 at night- cannot tolerate CPAP    Systolic CHF ([address]     Admission 2/13/16      Past Surgical History:   Procedure Laterality Date    HX BREAST LUMPECTOMY  2003    right    HX CATARACT REMOVAL  1994    bilateral    HX CHOLECYSTECTOMY      HX DILATION AND CURETTAGE  1960    HX HEART CATHETERIZATION      x2- no blockages    HX HEENT  2004    surgery on upper jaw    HX HYSTERECTOMY  1967    HX HYSTEROSCOPY      HX OOPHORECTOMY  1967    left    HX ORTHOPAEDIC  2010    right knee replacement    HX ORTHOPAEDIC      repair torn meniscus left knee    HX ORTHOPAEDIC  1996    repair knuckle on left pinky finger    HX OTHER SURGICAL  2000    remove benign tumore right side between rib and right lung    HX PACEMAKER      HX PARTIAL THYROIDECTOMY      HX UROLOGICAL  2003    bladder surgery x2     No current facility-administered medications on file prior to encounter. Current Outpatient Medications on File Prior to Encounter   Medication Sig Dispense Refill    benzonatate (TESSALON) 100 mg capsule Take 1 Cap by mouth three (3) times daily as needed for Cough for up to 5 days. 15 Cap 0    furosemide (LASIX) 20 mg tablet Take 1 Tab by mouth daily for 5 days. 5 Tab 0    losartan (COZAAR) 25 mg tablet Take 0.5 Tabs by mouth daily. 15 Tab 0    levothyroxine (SYNTHROID) 50 mcg tablet Take 50 mcg by mouth Daily (before breakfast).  insulin glargine (LANTUS U-100 INSULIN) 100 unit/mL injection 22 Units by SubCUTAneous route Daily (before lunch).  SITagliptin (JANUVIA) 50 mg tablet Take 50 mg by mouth daily.  carvedilol (COREG) 6.25 mg tablet TAKE 1 TABLET TWICE A DAY WITH MEALS 180 Tab 0    co-enzyme Q-10 (CO Q-10) 50 mg capsule Take 50 mg by mouth daily (after lunch).  sertraline (ZOLOFT) 100 mg tablet Take 100 mg by mouth daily.  melatonin 10 mg cap Take 10 mg by mouth nightly.       DOCOSAHEXANOIC ACID/EPA (FISH OIL PO) Take 1 Tab by mouth daily (after lunch). Allergies   Allergen Reactions    Beta-Blockers (Beta-Adrenergic Blocking Agts) Unknown (comments)     Made her feel tired, breakout on her back    Other Medication Unknown (comments)     Bromides    Phenobarbital Unknown (comments)    Sulfa (Sulfonamide Antibiotics) Swelling     rash             Review of Symptoms:  She is not a good historian. 10 systems reviewed and negative other than as noted in HPI  Other systems reviewed and negative except as above. Physical Exam    Visit Vitals  /80 (BP 1 Location: Left arm, BP Patient Position: Head of bed elevated (Comment degrees))   Pulse 86   Temp 98.3 °F (36.8 °C)   Resp 20   Ht 5' 5\" (1.651 m)   Wt 186 lb (84.4 kg)   SpO2 97%   BMI 30.95 kg/m²     Visit Vitals  /77 (BP 1 Location: Left arm, BP Patient Position: Head of bed elevated (Comment degrees))   Pulse 88   Temp 97.7 °F (36.5 °C)   Resp 20   Ht 5' 5\" (1.651 m)   Wt 186 lb (84.4 kg)   SpO2 100%   BMI 30.95 kg/m²     General Appearance:  Well developed, well nourished,alert and oriented x 3, and individual in no acute distress. Ears/Nose/Mouth/Throat:   Hearing grossly normal.         Neck: Supple. Chest:   Lungs clear to auscultation bilaterally. Cardiovascular:  Regular rate and rhythm, S1, S2 normal, no murmur. Abdomen:   Soft, non-tender, bowel sounds are active. Extremities: No edema bilaterally. Skin: Warm and dry.                Cardiographics    Telemetry: ventricular paced rhythm  ECG: ventricular paced rhythm        Recent Results (from the past 12 hour(s))   METABOLIC PANEL, COMPREHENSIVE    Collection Time: 02/20/20  4:25 AM   Result Value Ref Range    Sodium 131 (L) 136 - 145 mmol/L    Potassium 3.5 3.5 - 5.1 mmol/L    Chloride 100 97 - 108 mmol/L    CO2 19 (L) 21 - 32 mmol/L    Anion gap 12 5 - 15 mmol/L    Glucose 88 65 - 100 mg/dL    BUN 47 (H) 6 - 20 MG/DL    Creatinine 1.58 (H) 0.55 - 1.02 MG/DL    BUN/Creatinine ratio 30 (H) 12 - 20      GFR est AA 37 (L) >60 ml/min/1.73m2    GFR est non-AA 31 (L) >60 ml/min/1.73m2    Calcium 8.1 (L) 8.5 - 10.1 MG/DL    Bilirubin, total 0.4 0.2 - 1.0 MG/DL    ALT (SGPT) 57 12 - 78 U/L    AST (SGOT) 37 15 - 37 U/L    Alk. phosphatase 50 45 - 117 U/L    Protein, total 6.4 6.4 - 8.2 g/dL    Albumin 2.9 (L) 3.5 - 5.0 g/dL    Globulin 3.5 2.0 - 4.0 g/dL    A-G Ratio 0.8 (L) 1.1 - 2.2     CBC WITH AUTOMATED DIFF    Collection Time: 02/20/20  4:25 AM   Result Value Ref Range    WBC 3.3 (L) 3.6 - 11.0 K/uL    RBC 2.03 (L) 3.80 - 5.20 M/uL    HGB 7.6 (L) 11.5 - 16.0 g/dL    HCT 22.9 (L) 35.0 - 47.0 %    .8 (H) 80.0 - 99.0 FL    MCH 37.4 (H) 26.0 - 34.0 PG    MCHC 33.2 30.0 - 36.5 g/dL    RDW 15.4 (H) 11.5 - 14.5 %    PLATELET 945 746 - 394 K/uL    MPV 11.5 8.9 - 12.9 FL    NRBC 0.0 0  WBC    ABSOLUTE NRBC 0.00 0.00 - 0.01 K/uL    NEUTROPHILS 65 32 - 75 %    LYMPHOCYTES 21 12 - 49 %    MONOCYTES 10 5 - 13 %    EOSINOPHILS 2 0 - 7 %    BASOPHILS 1 0 - 1 %    IMMATURE GRANULOCYTES 1 (H) 0.0 - 0.5 %    ABS. NEUTROPHILS 2.2 1.8 - 8.0 K/UL    ABS. LYMPHOCYTES 0.7 (L) 0.8 - 3.5 K/UL    ABS. MONOCYTES 0.3 0.0 - 1.0 K/UL    ABS. EOSINOPHILS 0.1 0.0 - 0.4 K/UL    ABS. BASOPHILS 0.0 0.0 - 0.1 K/UL    ABS. IMM.  GRANS. 0.0 0.00 - 0.04 K/UL    DF SMEAR SCANNED      RBC COMMENTS MACROCYTOSIS  1+       TROPONIN I    Collection Time: 02/20/20  4:25 AM   Result Value Ref Range    Troponin-I, Qt. 0.22 (H) <0.05 ng/mL   GLUCOSE, POC    Collection Time: 02/20/20  7:44 AM   Result Value Ref Range    Glucose (POC) 93 65 - 100 mg/dL    Performed by Diann Woods (PCT)

## 2020-02-20 NOTE — PROGRESS NOTES
Pharmacy changed lovenox to 30 mg q24H, for CrCl of 23 ml/min, per protocol. Pharmacy will follow daily.

## 2020-02-20 NOTE — PROGRESS NOTES
Occupational Therapy Note:    Orders acknowledged and chart reviewed. Pt with hospice consult and nursing states that pt is having elevated HR this afternoon. Will defer at this time and follow up tomorrow as appropriate. Thank you.     Larina Eisenmenger, OTR/L

## 2020-02-20 NOTE — PROGRESS NOTES
2100 Hour: Received patient from [facility] per bed. Patient alert, accompanied by Bela Montilla adopted daughter at bedside. Patient requesting throat logengers. States her throat is sore. Patient restless sitting on side of bed to chair. Extremely restless. 2130 Hour: Lactic acid drawn sent to Lab, resulted 1.4    2200 LPN  name Don Fox to sit with patient this shiift. Came to 318 to sit, soon left to go home. Stated she has asthma and cannot wear the mask. Don Fox made a phone call to unknown party and left the hospital. The patient is very restless. Getting out of bed, sitting on side of bed, incontinent of urine. Stating she wants to go home. Requesting water. G chuck at bedside taking few sips. 2230 Hour: Troponin sent as ordered. Assisted to the bathroom, voided. 2300 Hour: Patient restless bed alarm alarming. Patient sitting up on side of bed. Very restless. No Sitter available per [employer]. The patient is very difficult to manage, nursing staff in room frequently to prevent patient falls. Bed in lowest position, call light in reach, Bed alarm activated. Nurse physically sitting at the bedside of patient to prevent falls. On-call MD made aware of patints  Mental status. No new orders given. 0000Hour: VSS. Brief periods of rest. Then up on side of bed.    0130 Hour: PCT (Sitter) arrived from 4th floor to sit with patient. 0430 Hour: PCT returned to 4th floor. Patient was monitored per nursing staff. Bed in low position, bed locked, wearing Yellow socks, bed alarm activated. Yellow bracelet on wrist. Purple band placed on wrist.    Troponin sent to Lab.    0600Hour; Assisted patient to bathroom per walker several times. Steady on feet, just weak, moving about slowly. Patient has brief episodes where she will rest, then she is back up sitting on side of bed. Bedside, Verbal and  shift change report given to LISA Ortega (oncoming nurse) by Yaz Cota (offgoing nurse).  Report included the following information SBAR, Kardex, Intake/Output, MAR, Recent Results and Cardiac Rhythm V-Paced. Yonas Maza

## 2020-02-20 NOTE — PROGRESS NOTES
Hospitalist Progress Note           2020  10:57 AM                       Patient:  Merline Montenegro  PCP:  Sepideh Cohen MD  Date of admission:  2020      79 yo female with PMHx of CVA, DM, Dementia, Chronic systolic CHF, Breast Ca admitted for cough.      Assessment & Plan  Dyspnea likely due to bilateral Pneumonia, possible Bacterial superimposed on Viral   - c/w Cefepime  - Diuresis    Reactive Airway Disease due to above  - Nebs and IV Steroids   - monitor     Acute Kidney Insufficiency  - stable cr     DM  - lantus and sliding scale  - switch Januvia to Nesina      HTN  - c/w Coreg,      Acute on Chronic Diastolic CHF  - BVP>38P   - IV Bumex  - monitor   - Last ECHO EF 76-41% without systolic dysfunction  - stable     Hx of Breast Ca  - outpatient follow as neded      Dementia without behavioral disturbance  - stable   - Alb 2.7  - poor insight and appetite       VTE prophylaxis: Lovenox   Follow-up labs/studies: NONE  Discussed plan of care with Patient/Family, Nurse and    Disposition:  Anticipate discharge to Pending       Subjective  Pt seen and examined  Dry cough   Wheezing, sob    Review of systems otherwise as above     Physical examination  Visit Vitals  /80 (BP 1 Location: Left arm, BP Patient Position: Head of bed elevated (Comment degrees))   Pulse 86   Temp 98.3 °F (36.8 °C)   Resp 20   Ht 5' 5\" (1.651 m)   Wt 84.4 kg (186 lb)   SpO2 97%   BMI 30.95 kg/m²      Temp (24hrs), Av.2 °F (36.8 °C), Min:97.7 °F (36.5 °C), Max:98.8 °F (37.1 °C)         O2 Device: Room air  Visit Vitals  /80 (BP 1 Location: Left arm, BP Patient Position: Head of bed elevated (Comment degrees))   Pulse 86   Temp 98.3 °F (36.8 °C)   Resp 20   Ht 5' 5\" (1.651 m)   Wt 84.4 kg (186 lb)   SpO2 97%   BMI 30.95 kg/m²    O2 Device: Room air  Visit Vitals  /80 (BP 1 Location: Left arm, BP Patient Position: Head of bed elevated (Comment degrees))   Pulse 86   Temp 98.3 °F (36.8 °C)   Resp 20   Ht 5' 5\" (1.651 m)   Wt 84.4 kg (186 lb)   SpO2 97%   BMI 30.95 kg/m²         Intake/Output Summary (Last 24 hours) at 2/20/2020 1057  Last data filed at 2/20/2020 0748  Gross per 24 hour   Intake 590 ml   Output 625 ml   Net -35 ml     Last shift:    02/20 0701 - 02/20 1900  In: 240 [P.O.:240]  Out: -   Last 3 shifts:    02/18 1901 - 02/20 0700  In: 350 [P.O.:350]  Out: 625 [Urine:625]    General:   Alert, cooperative, no acute distress   Head:   No obvious abnormalities, atraumatic   Eyes:   Conjunctivae clear   Oropharynx:  Oral mucosa normal   Neck:  Supple, trachea midline, no adenopathy   No JVD   Back:    No CVA tenderness    Chest wall:    No tenderness or deformities    Lungs:   decreased BS, B/l Wheezing    Heart:   Regular rhythm,   Abdomen:    Soft, non-tender    Bowel sounds normal    No masses or organomegaly    Extremities:  No edema or DVT signs   Pulses:  Symmetric all extremities   Skin:  Warm and dry    No rashes or lesions   Neurologic:  Oriented x 1-2    No focal deficits   Urinary catheter:  deferred     Data revi      Recent Labs     02/20/20  0425 02/19/20  1600 02/17/20  1352   WBC 3.3* 6.0 3.7   HGB 7.6* 7.9* 7.9*   HCT 22.9* 25.4* 23.5*    259 228     Recent Labs     02/20/20  0425 02/19/20  1600 02/19/20  0231 02/17/20  1352  02/17/20  1342   * 129* 133* 131*   < >  --    K 3.5 4.9 4.4 3.9   < >  --     102 104 103   < >  --    CO2 19* 19* 22 20*   < >  --    GLU 88 121* 148* 153*   < >  --    BUN 47* 50* 43* 32*   < >  --    CREA 1.58* 1.72* 1.74* 1.61*   < >  --    CA 8.1* 8.4* 8.4* 7.6*   < >  --    ALB 2.9* 3.1*  --  3.3*  --   --    TBILI 0.4 0.5  --  1.1*  --   --    SGOT 37 54*  --  24  --   --    ALT 57 55  --  25  --   --    INR  --   --   --   --   --  1.1    < > = values in this interval not displayed. Current Facility-Administered Medications   Medication Dose Route Frequency    methylPREDNISolone (PF) (SOLU-MEDROL) injection 40 mg  40 mg IntraVENous Q8H    albuterol-ipratropium (DUO-NEB) 2.5 MG-0.5 MG/3 ML  3 mL Nebulization Q4H RT    sodium chloride (NS) flush 5-40 mL  5-40 mL IntraVENous Q8H    sodium chloride (NS) flush 5-40 mL  5-40 mL IntraVENous PRN    acetaminophen (TYLENOL) tablet 650 mg  650 mg Oral Q4H PRN    naloxone (NARCAN) injection 0.4 mg  0.4 mg IntraVENous PRN    ondansetron (ZOFRAN) injection 4 mg  4 mg IntraVENous Q4H PRN    morphine injection 1 mg  1 mg IntraVENous Q4H PRN    cefepime (MAXIPIME) 2 g in 0.9% sodium chloride (MBP/ADV) 100 mL  2 g IntraVENous Q24H    bumetanide (BUMEX) injection 1 mg  1 mg IntraVENous BID    benzonatate (TESSALON) capsule 100 mg  100 mg Oral TID PRN    levothyroxine (SYNTHROID) tablet 50 mcg  50 mcg Oral ACB    melatonin tablet 10.5 mg  10.5 mg Oral QHS    losartan (COZAAR) tablet 12.5 mg  12.5 mg Oral DAILY    sertraline (ZOLOFT) tablet 100 mg  100 mg Oral DAILY    insulin lispro (HUMALOG) injection   SubCUTAneous AC&HS    glucose chewable tablet 16 g  4 Tab Oral PRN    glucagon (GLUCAGEN) injection 1 mg  1 mg IntraMUSCular PRN    dextrose 10% infusion 0-250 mL  0-250 mL IntraVENous PRN    enoxaparin (LOVENOX) injection 30 mg  30 mg SubCUTAneous Q24H     Total time spent managing care of the patient: 35 minutes.        Cassidy Antoine MD   --Hospitalist, Internal Medicine

## 2020-02-20 NOTE — PROGRESS NOTES
CM follow up:    Referral received for Home hospice eval and treat. Per Dr. Glass Komal, daughter prefers [facility]. Referral placed to [facility] via [employer]. Call placed to franko Jay. Await response. CM Follow up  Patient accepted by [facility], liaison met with patient and will contact family.     Ben Burkett RN, MSN/Care manager

## 2020-02-20 NOTE — PROGRESS NOTES
Bedside and Verbal shift change report given to Rhona (oncoming nurse) by Ann Christy (offgoing nurse). Report included the following information SBAR, Kardex, ED Summary and Cardiac Rhythm AV  paced. 0740 patient off floor for xray,, tele notified. 0900 patient back on floor from xray, tele notified. 0915 diuretic given to patient, educated caregiver regarding the diuretic, patient bathed and purewick placed for accurate I/O.     1240 patient having runs of NSVT (between 5-12 beats). Dr. Dixie Motta notified, replacing potassium. Cardiology also paged. [phone] Orders received from Kateryna Gallardo NP with cardiology. 1530 mag at 2, potassium at 4, no further notifications of vtach runs. 1800 patient resting quietly in bed with sitter at bedside. Bedside and Verbal shift change report given to Babar Hill (oncoming nurse) by Sanjuanita Mckenzie (offgoing nurse). Report included the following information SBAR, Kardex and Cardiac Rhythm AV paced.

## 2020-02-20 NOTE — NURSE NAVIGATOR
Chart reviewed by Heart Failure Nurse Navigator. Heart Failure database completed. EF:  20 to 25%    ACEi/ARB/ARNi: Losartan 12.5 mg BID    BB: Carvedilol 6.25 mg BID    Aldosterone Antagonist: **    Obstructive Sleep Apnea Screening:   STOP-BANG score:   Referred to Sleep Medicine:     CRT BiV ICD in situ     NYHA Functional Class **. Heart Failure Teach Back in Patient Education. Heart Failure Avoiding Triggers on Discharge Instructions. Cardiologist: Dr. Giselle Thornton discharge follow up phone call to be made within 48-72 hours of discharge.

## 2020-02-20 NOTE — PROGRESS NOTES
Physical Therapy:  Orders received and chart reviewed. Patient now going with Hospice and nursing states elevated heart rate this afternoon. We will defer and re-attempt tomorrow and follow if she is going with hospice we will complete the order. Thank you.   Nemo Adan PT,DPT,NCS

## 2020-02-21 LAB
ANION GAP SERPL CALC-SCNC: 11 MMOL/L (ref 5–15)
ATRIAL RATE: 91 BPM
BASOPHILS # BLD: 0 K/UL (ref 0–0.1)
BASOPHILS NFR BLD: 0 % (ref 0–1)
BUN SERPL-MCNC: 38 MG/DL (ref 6–20)
BUN/CREAT SERPL: 31 (ref 12–20)
CALCIUM SERPL-MCNC: 6.8 MG/DL (ref 8.5–10.1)
CALCULATED P AXIS, ECG09: 96 DEGREES
CALCULATED R AXIS, ECG10: -48 DEGREES
CALCULATED T AXIS, ECG11: -88 DEGREES
CHLORIDE SERPL-SCNC: 108 MMOL/L (ref 97–108)
CO2 SERPL-SCNC: 16 MMOL/L (ref 21–32)
CREAT SERPL-MCNC: 1.21 MG/DL (ref 0.55–1.02)
DIAGNOSIS, 93000: NORMAL
DIFFERENTIAL METHOD BLD: ABNORMAL
EOSINOPHIL # BLD: 0 K/UL (ref 0–0.4)
EOSINOPHIL NFR BLD: 0 % (ref 0–7)
ERYTHROCYTE [DISTWIDTH] IN BLOOD BY AUTOMATED COUNT: 15.1 % (ref 11.5–14.5)
GLUCOSE BLD STRIP.AUTO-MCNC: 201 MG/DL (ref 65–100)
GLUCOSE BLD STRIP.AUTO-MCNC: 278 MG/DL (ref 65–100)
GLUCOSE BLD STRIP.AUTO-MCNC: 299 MG/DL (ref 65–100)
GLUCOSE BLD STRIP.AUTO-MCNC: 86 MG/DL (ref 65–100)
GLUCOSE SERPL-MCNC: 158 MG/DL (ref 65–100)
HCT VFR BLD AUTO: 23.3 % (ref 35–47)
HGB BLD-MCNC: 7.9 G/DL (ref 11.5–16)
IMM GRANULOCYTES # BLD AUTO: 0 K/UL (ref 0–0.04)
IMM GRANULOCYTES NFR BLD AUTO: 1 % (ref 0–0.5)
LYMPHOCYTES # BLD: 0.3 K/UL (ref 0.8–3.5)
LYMPHOCYTES NFR BLD: 10 % (ref 12–49)
MCH RBC QN AUTO: 38 PG (ref 26–34)
MCHC RBC AUTO-ENTMCNC: 33.9 G/DL (ref 30–36.5)
MCV RBC AUTO: 112 FL (ref 80–99)
MONOCYTES # BLD: 0.1 K/UL (ref 0–1)
MONOCYTES NFR BLD: 5 % (ref 5–13)
NEUTS SEG # BLD: 2.4 K/UL (ref 1.8–8)
NEUTS SEG NFR BLD: 84 % (ref 32–75)
NRBC # BLD: 0 K/UL (ref 0–0.01)
NRBC BLD-RTO: 0 PER 100 WBC
PLATELET # BLD AUTO: 287 K/UL (ref 150–400)
PMV BLD AUTO: 11.9 FL (ref 8.9–12.9)
POTASSIUM SERPL-SCNC: 3.1 MMOL/L (ref 3.5–5.1)
Q-T INTERVAL, ECG07: 486 MS
QRS DURATION, ECG06: 142 MS
QTC CALCULATION (BEZET), ECG08: 597 MS
RBC # BLD AUTO: 2.08 M/UL (ref 3.8–5.2)
RBC MORPH BLD: ABNORMAL
SERVICE CMNT-IMP: ABNORMAL
SERVICE CMNT-IMP: NORMAL
SODIUM SERPL-SCNC: 135 MMOL/L (ref 136–145)
VENTRICULAR RATE, ECG03: 91 BPM
WBC # BLD AUTO: 2.8 K/UL (ref 3.6–11)

## 2020-02-21 PROCEDURE — 94760 N-INVAS EAR/PLS OXIMETRY 1: CPT

## 2020-02-21 PROCEDURE — 94640 AIRWAY INHALATION TREATMENT: CPT

## 2020-02-21 PROCEDURE — 74011250637 HC RX REV CODE- 250/637: Performed by: FAMILY MEDICINE

## 2020-02-21 PROCEDURE — 74011250637 HC RX REV CODE- 250/637: Performed by: HOSPITALIST

## 2020-02-21 PROCEDURE — 97116 GAIT TRAINING THERAPY: CPT

## 2020-02-21 PROCEDURE — 97161 PT EVAL LOW COMPLEX 20 MIN: CPT

## 2020-02-21 PROCEDURE — 74011000258 HC RX REV CODE- 258: Performed by: INTERNAL MEDICINE

## 2020-02-21 PROCEDURE — 74011000250 HC RX REV CODE- 250: Performed by: SPECIALIST

## 2020-02-21 PROCEDURE — 97165 OT EVAL LOW COMPLEX 30 MIN: CPT

## 2020-02-21 PROCEDURE — 82962 GLUCOSE BLOOD TEST: CPT

## 2020-02-21 PROCEDURE — 93005 ELECTROCARDIOGRAM TRACING: CPT

## 2020-02-21 PROCEDURE — 74011250637 HC RX REV CODE- 250/637: Performed by: INTERNAL MEDICINE

## 2020-02-21 PROCEDURE — 74011250636 HC RX REV CODE- 250/636: Performed by: INTERNAL MEDICINE

## 2020-02-21 PROCEDURE — 36415 COLL VENOUS BLD VENIPUNCTURE: CPT

## 2020-02-21 PROCEDURE — 74011000250 HC RX REV CODE- 250: Performed by: HOSPITALIST

## 2020-02-21 PROCEDURE — 74011636637 HC RX REV CODE- 636/637: Performed by: HOSPITALIST

## 2020-02-21 PROCEDURE — 74011250637 HC RX REV CODE- 250/637: Performed by: NURSE PRACTITIONER

## 2020-02-21 PROCEDURE — 80048 BASIC METABOLIC PNL TOTAL CA: CPT

## 2020-02-21 PROCEDURE — 65270000029 HC RM PRIVATE

## 2020-02-21 PROCEDURE — 74011636637 HC RX REV CODE- 636/637: Performed by: INTERNAL MEDICINE

## 2020-02-21 PROCEDURE — 85025 COMPLETE CBC W/AUTO DIFF WBC: CPT

## 2020-02-21 PROCEDURE — 74011000250 HC RX REV CODE- 250: Performed by: INTERNAL MEDICINE

## 2020-02-21 PROCEDURE — 74011250636 HC RX REV CODE- 250/636: Performed by: HOSPITALIST

## 2020-02-21 RX ORDER — CARVEDILOL 6.25 MG/1
6.25 TABLET ORAL 2 TIMES DAILY WITH MEALS
Status: DISCONTINUED | OUTPATIENT
Start: 2020-02-21 | End: 2020-02-22 | Stop reason: HOSPADM

## 2020-02-21 RX ORDER — ALOGLIPTIN 12.5 MG/1
12.5 TABLET, FILM COATED ORAL DAILY
Status: DISCONTINUED | OUTPATIENT
Start: 2020-02-21 | End: 2020-02-22 | Stop reason: HOSPADM

## 2020-02-21 RX ORDER — BUMETANIDE 0.25 MG/ML
1 INJECTION INTRAMUSCULAR; INTRAVENOUS 2 TIMES DAILY
Status: COMPLETED | OUTPATIENT
Start: 2020-02-21 | End: 2020-02-21

## 2020-02-21 RX ORDER — SODIUM BICARBONATE 650 MG/1
650 TABLET ORAL 2 TIMES DAILY
Status: DISCONTINUED | OUTPATIENT
Start: 2020-02-21 | End: 2020-02-22 | Stop reason: HOSPADM

## 2020-02-21 RX ORDER — BUMETANIDE 1 MG/1
1 TABLET ORAL DAILY
Status: DISCONTINUED | OUTPATIENT
Start: 2020-02-22 | End: 2020-02-22 | Stop reason: HOSPADM

## 2020-02-21 RX ORDER — INSULIN LISPRO 100 [IU]/ML
8 INJECTION, SOLUTION INTRAVENOUS; SUBCUTANEOUS
Status: DISCONTINUED | OUTPATIENT
Start: 2020-02-21 | End: 2020-02-22 | Stop reason: HOSPADM

## 2020-02-21 RX ORDER — SPIRONOLACTONE 25 MG/1
25 TABLET ORAL DAILY
Status: DISCONTINUED | OUTPATIENT
Start: 2020-02-21 | End: 2020-02-22 | Stop reason: HOSPADM

## 2020-02-21 RX ORDER — BENZONATATE 100 MG/1
100 CAPSULE ORAL 3 TIMES DAILY
Status: DISCONTINUED | OUTPATIENT
Start: 2020-02-21 | End: 2020-02-22 | Stop reason: HOSPADM

## 2020-02-21 RX ADMIN — BENZONATATE 100 MG: 100 CAPSULE ORAL at 16:29

## 2020-02-21 RX ADMIN — POTASSIUM BICARBONATE 40 MEQ: 782 TABLET, EFFERVESCENT ORAL at 18:20

## 2020-02-21 RX ADMIN — LEVALBUTEROL HYDROCHLORIDE 0.63 MG: 0.63 SOLUTION RESPIRATORY (INHALATION) at 07:31

## 2020-02-21 RX ADMIN — INSULIN LISPRO 5 UNITS: 100 INJECTION, SOLUTION INTRAVENOUS; SUBCUTANEOUS at 12:02

## 2020-02-21 RX ADMIN — CEFEPIME HYDROCHLORIDE 2 G: 2 INJECTION, POWDER, FOR SOLUTION INTRAVENOUS at 18:20

## 2020-02-21 RX ADMIN — LEVALBUTEROL HYDROCHLORIDE 0.63 MG: 0.63 SOLUTION RESPIRATORY (INHALATION) at 23:39

## 2020-02-21 RX ADMIN — Medication 10 ML: at 20:22

## 2020-02-21 RX ADMIN — LOSARTAN POTASSIUM 12.5 MG: 25 TABLET, FILM COATED ORAL at 08:26

## 2020-02-21 RX ADMIN — LEVALBUTEROL HYDROCHLORIDE 0.63 MG: 0.63 SOLUTION RESPIRATORY (INHALATION) at 03:55

## 2020-02-21 RX ADMIN — CARVEDILOL 6.25 MG: 6.25 TABLET, FILM COATED ORAL at 16:29

## 2020-02-21 RX ADMIN — SODIUM BICARBONATE 650 MG: 650 TABLET ORAL at 18:20

## 2020-02-21 RX ADMIN — SODIUM BICARBONATE 650 MG: 650 TABLET ORAL at 08:25

## 2020-02-21 RX ADMIN — METHYLPREDNISOLONE SODIUM SUCCINATE 40 MG: 40 INJECTION, POWDER, FOR SOLUTION INTRAMUSCULAR; INTRAVENOUS at 20:22

## 2020-02-21 RX ADMIN — LEVALBUTEROL HYDROCHLORIDE 0.63 MG: 0.63 SOLUTION RESPIRATORY (INHALATION) at 15:23

## 2020-02-21 RX ADMIN — Medication 10 ML: at 05:56

## 2020-02-21 RX ADMIN — BUMETANIDE 1 MG: 0.25 INJECTION INTRAMUSCULAR; INTRAVENOUS at 08:27

## 2020-02-21 RX ADMIN — POTASSIUM BICARBONATE 40 MEQ: 782 TABLET, EFFERVESCENT ORAL at 08:26

## 2020-02-21 RX ADMIN — LEVALBUTEROL HYDROCHLORIDE 0.63 MG: 0.63 SOLUTION RESPIRATORY (INHALATION) at 20:07

## 2020-02-21 RX ADMIN — INSULIN LISPRO 5 UNITS: 100 INJECTION, SOLUTION INTRAVENOUS; SUBCUTANEOUS at 08:29

## 2020-02-21 RX ADMIN — SPIRONOLACTONE 25 MG: 25 TABLET ORAL at 12:01

## 2020-02-21 RX ADMIN — METHYLPREDNISOLONE SODIUM SUCCINATE 40 MG: 40 INJECTION, POWDER, FOR SOLUTION INTRAMUSCULAR; INTRAVENOUS at 02:12

## 2020-02-21 RX ADMIN — ONDANSETRON 4 MG: 2 INJECTION INTRAMUSCULAR; INTRAVENOUS at 18:28

## 2020-02-21 RX ADMIN — LEVOTHYROXINE SODIUM 50 MCG: 0.05 TABLET ORAL at 05:55

## 2020-02-21 RX ADMIN — ACETAMINOPHEN 650 MG: 325 TABLET ORAL at 02:12

## 2020-02-21 RX ADMIN — Medication 10 ML: at 16:30

## 2020-02-21 RX ADMIN — INSULIN LISPRO 5 UNITS: 100 INJECTION, SOLUTION INTRAVENOUS; SUBCUTANEOUS at 16:29

## 2020-02-21 RX ADMIN — INSULIN LISPRO 8 UNITS: 100 INJECTION, SOLUTION INTRAVENOUS; SUBCUTANEOUS at 12:01

## 2020-02-21 RX ADMIN — ALOGLIPTIN 12.5 MG: 12.5 TABLET, FILM COATED ORAL at 12:08

## 2020-02-21 RX ADMIN — BUMETANIDE 1 MG: 0.25 INJECTION INTRAMUSCULAR; INTRAVENOUS at 18:20

## 2020-02-21 RX ADMIN — BENZONATATE 100 MG: 100 CAPSULE ORAL at 12:05

## 2020-02-21 RX ADMIN — INSULIN LISPRO 8 UNITS: 100 INJECTION, SOLUTION INTRAVENOUS; SUBCUTANEOUS at 16:29

## 2020-02-21 RX ADMIN — ENOXAPARIN SODIUM 30 MG: 30 INJECTION SUBCUTANEOUS at 20:21

## 2020-02-21 RX ADMIN — LEVALBUTEROL HYDROCHLORIDE 0.63 MG: 0.63 SOLUTION RESPIRATORY (INHALATION) at 11:39

## 2020-02-21 RX ADMIN — BENZONATATE 100 MG: 100 CAPSULE ORAL at 06:28

## 2020-02-21 RX ADMIN — SERTRALINE HYDROCHLORIDE 100 MG: 50 TABLET ORAL at 08:25

## 2020-02-21 NOTE — PROGRESS NOTES
Problem: Self Care Deficits Care Plan (Adult)  Goal: *Acute Goals and Plan of Care (Insert Text)  Description  FUNCTIONAL STATUS PRIOR TO ADMISSION: Patient was modified independent using a rolling walker for functional mobility. Patient required supervision/set up to independence for ADLs. She received assistance for IADLs. HOME SUPPORT PRIOR TO ADMISSION: The patient lived with care aids who are there 24/7. Occupational Therapy Goals  Initiated 2/21/2020  1. Patient will perform grooming, standing at sink, with supervision/set-up within 7 day(s). 2.  Patient will perform lower body dressing with supervision/set-up within 7 day(s). 3.  Patient will perform bathing with supervision/set-up within 7 day(s). 4.  Patient will perform toilet transfers with supervision/set-up within 7 day(s). 5.  Patient will perform all aspects of toileting with supervision/set-up within 7 day(s). 6.  Patient will utilize energy conservation techniques during functional activities with verbal cues within 7 day(s). Outcome: Progressing Towards Goal     OCCUPATIONAL THERAPY EVALUATION  Patient: Nemo Todd (72 y.o. female)  Date: 2/21/2020  Primary Diagnosis: Acute systolic CHF (congestive heart failure) ([facility] [address] [I50.21]        Precautions: Standard      ASSESSMENT  Based on the objective data described below, the patient presents with decreased activity tolerance and decreased ADL performance following admission acute CHF. She is pleasant and agreeable to minimal participation due to fatigue. She is able to perform ADL transfer to chair with rolling walker in order to eat lunch. Noted HR in 90s. Patient appears SOB with minimal activity but SpO2 stable. Will continue to follow for energy conservation techniques and home safety training. Anticipate pt will return home with no follow up OT needs. Current Level of Function Impacting Discharge (ADLs/self-care): overall SBA to CGA ADLs    Functional Outcome Measure:   The patient scored Total: 60/100 on the Barthel Index outcome measure which is indicative of 40% impaired ability to care for basic self needs/dependency on others; inferred 50% dependency on others for instrumental ADLs. Other factors to consider for discharge: has 24/7 caregivers     Patient will benefit from skilled therapy intervention to address the above noted impairments. PLAN :  Recommendations and Planned Interventions: self care training, functional mobility training, therapeutic exercise, balance training, therapeutic activities, cognitive retraining, endurance activities, patient education, home safety training, and family training/education    Frequency/Duration: Patient will be followed by occupational therapy 2 times a week to address goals. Recommendation for discharge: (in order for the patient to meet his/her long term goals)  No skilled occupational therapy/ follow up rehabilitation needs identified at this time. This discharge recommendation:  Has not yet been discussed the attending provider and/or case management    IF patient discharges home will need the following DME: pt owns shower chair; may benefit from [facility] for night use       SUBJECTIVE:   Patient stated I am so tired.     OBJECTIVE DATA SUMMARY:   HISTORY:   Past Medical History:   Diagnosis Date    Arthritis     Breast cancer ([facility] [address]     Cerebral microvascular disease     Dementia ([facility] [address]     Diabetes ([facility] [address]     Dyslipidemia     GERD (gastroesophageal reflux disease)     Hearing loss     Hypertension     Hypothyroid     LBBB (left bundle branch block)     Melanoma ([facility] [address]     Non-ischemic cardiomyopathy ([facility] [address]     Cath 2/16/15 - Mild (non-obstructive) CAD,LVEF 30%, 2+ MR    Skin cancer     melanoma on legs    Sleep apnea     wears O2 at night- cannot tolerate CPAP    Systolic CHF [facility])     Admission 2/13/16     Past Surgical History:   Procedure Laterality Date    HX BREAST LUMPECTOMY  2003    right    [address] [facility] bilateral    HX CHOLECYSTECTOMY      HX DILATION AND CURETTAGE  1960    HX HEART CATHETERIZATION      x2- no blockages    HX HEENT  2004    surgery on upper jaw    HX HYSTERECTOMY  1967    HX HYSTEROSCOPY      HX OOPHORECTOMY  1967    left    HX ORTHOPAEDIC  2010    right knee replacement    HX ORTHOPAEDIC      repair torn meniscus left knee    HX ORTHOPAEDIC  1996    repair knuckle on left pinky finger    HX OTHER SURGICAL  2000    remove benign tumore right side between rib and right lung    HX PACEMAKER      HX PARTIAL THYROIDECTOMY      HX UROLOGICAL  2003    bladder surgery x2     Expanded or extensive additional review of patient history:     Home Situation  Home Environment: Independent living  One/Two Story Residence: One story  Living Alone: Yes  Support Systems: Home care staff  Patient Expects to be Discharged to[de-identified] Independent living facility  Current DME Used/Available at Home: Wheelchair  Tub or Shower Type: Shower    Hand dominance: Right    EXAMINATION OF PERFORMANCE DEFICITS:  Cognitive/Behavioral Status:  Neurologic State: Alert  Orientation Level: Oriented to person  Cognition: Follows commands;Decreased attention/concentration  Perception: Appears intact  Perseveration: No perseveration noted  Safety/Judgement: Awareness of environment; Fall prevention    Hearing: Auditory  Auditory Impairment: None    Vision/Perceptual:     Corrective Lenses: Reading glasses    Range of Motion:  AROM: Within functional limits  PROM: Within functional limits    Strength:  Strength: Within functional limits    Coordination:  Coordination: Within functional limits  Fine Motor Skills-Upper: Left Intact; Right Intact    Gross Motor Skills-Upper: Left Intact; Right Intact    Tone:  Tone: Normal    Balance:  Sitting: Intact  Standing: Intact    Functional Mobility and Transfers for ADLs:  Bed Mobility:  Rolling: Modified independent  Supine to Sit: Modified independent  Sit to Supine: Modified independent    Transfers:  Sit to Stand: Supervision  Stand to Sit: Supervision  Bed to Chair: Supervision  Toilet Transfer : Stand-by assistance(infer based on observations)    ADL Assessment:  Feeding: Modified independent    Oral Facial Hygiene/Grooming: Supervision;Stand-by assistance(infer for standing at sink)    Bathing: Stand-by assistance;Contact guard assistance(infer based on observations)    Upper Body Dressing: Modified independent    Lower Body Dressing: Stand-by assistance;Contact guard assistance(infer based on observations)    Toileting: Contact guard assistance;Minimum assistance(infer based on observations)    ADL Intervention and task modifications:  Feeding  Food to Mouth: Independent  Drink to Mouth: Independent    Cognitive Retraining  Safety/Judgement: Awareness of environment; Fall prevention    Functional Measure:  Barthel Index:    Bathin  Bladder: 10  Bowels: 10  Groomin  Dressing: 10  Feedin  Mobility: 0  Stairs: 0  Toilet Use: 5  Transfer (Bed to Chair and Back): 10  Total: 60/100        The Barthel ADL Index: Guidelines  1. The index should be used as a record of what a patient does, not as a record of what a patient could do. 2. The main aim is to establish degree of independence from any help, physical or verbal, however minor and for whatever reason. 3. The need for supervision renders the patient not independent. 4. A patient's performance should be established using the best available evidence. Asking the patient, friends/relatives and nurses are the usual sources, but direct observation and common sense are also important. However direct testing is not needed. 5. Usually the patient's performance over the preceding 24-48 hours is important, but occasionally longer periods will be relevant. 6. Middle categories imply that the patient supplies over 50 per cent of the effort. 7. Use of aids to be independent is allowed. Jeannine Frausto., Barthel, D.W. (7578). Functional evaluation: the Barthel Index. [address] [facility] (14)2. FAREED Hayden, Juan Sr., Beni Becker., Jovana Vazquez, [address] (1999). Measuring the change indisability after inpatient rehabilitation; comparison of the responsiveness of the Barthel Index and Functional [place] Measure. Journal of Neurology, Neurosurgery, and Psychiatry, 66(4), 529-871. GIULIANA Parra, DAVID Payne, & Elodia Glover M.A. (2004.) Assessment of post-stroke quality of life in cost-effectiveness studies: The usefulness of the Barthel Index and the EuroQoL-5D. Quality of Life Research, 15, 581-22     Occupational Therapy Evaluation Charge Determination   History Examination Decision-Making   LOW Complexity : Brief history review  MEDIUM Complexity : 3-5 performance deficits relating to physical, cognitive , or psychosocial skils that result in activity limitations and / or participation restrictions MEDIUM Complexity : Patient may present with comorbidities that affect occupational performnce. Miniml to moderate modification of tasks or assistance (eg, physical or verbal ) with assesment(s) is necessary to enable patient to complete evaluation       Based on the above components, the patient evaluation is determined to be of the following complexity level: LOW   Pain Rating:  Pt with c/c of fatigue    Activity Tolerance:   Fair, SpO2 stable on RA, and observed SOB with activity  Please refer to the flowsheet for vital signs taken during this treatment. After treatment patient left in no apparent distress:    Sitting in chair, Call bell within reach, and Caregiver / family present    COMMUNICATION/EDUCATION:   The patients plan of care was discussed with: Physical Therapist and Registered Nurse. Home safety education was provided and the patient/caregiver indicated understanding. This patients plan of care is appropriate for delegation to [facility].     Thank you for this referral.  Brook Collier, OT  Merna Calculation: 13 mins

## 2020-02-21 NOTE — PROGRESS NOTES
TRANSFER - IN REPORT:    Verbal report received from Jennifer(name) on Ella Marcus  being received from [facility](unit) for routine progression of care      Report consisted of patients Situation, Background, Assessment and   Recommendations(SBAR). Information from the following report(s) SBAR, Kardex, ED Summary, Intake/Output, MAR, Accordion and Recent Results was reviewed with the receiving nurse. Opportunity for questions and clarification was provided. Assessment completed upon patients arrival to unit and care assumed. 1934: Bedside and Verbal shift change report given to Marcel Iglesias (oncoming nurse) by Jorge Macias (offgoing nurse). Report included the following information SBAR, Kardex, ED Summary, Intake/Output, MAR, Accordion and Recent Results.

## 2020-02-21 NOTE — PROGRESS NOTES
visit for routine follow up, palliative care consult. Patient lying in bed resting, patient's caregiver sitting at bedside. Good eye contact, smiling, friendly, kind. Says she is feeling bad today. Provided spiritual presence and listening as she spoke of her present thoughts, feelings, and concerns. Caregiver spoke about the patient's health saying they were in discussion with Hospice concerning the next stage of care. Says she has known the patient a long time and has been caring for her for many years. Patient says she is feeling bad because she has been coughing a lot recently. No spiritual needs identified at this time. Informed patient and caregiver of  availability and pastoral care services. Both appeared comforted and encouraged as a result of this visit. Visited by Rev. Barbara Ferrara MDiv, [facility], [address] [employer]   paging service: [phone])

## 2020-02-21 NOTE — PROGRESS NOTES
Hospitalist Progress Note           2020  10:57 AM                       Patient:  Glenn Amador  PCP:  Bogdan Martinez MD  Date of admission:  2020      79 yo female with PMHx of CVA, DM, Dementia, Chronic systolic CHF, Breast Ca admitted for cough. Assessment & Plan  Acute Pulmonary Edema, Acute Systolic CHF, EF 23-89%  Bacterial Pneumonia   - c/w Cefepime  - Improved with bumex  - Cardiology following  - d/w Daughter Verdis Gottron regarding decreased EF, risk of frequent hospitalization due to worsening CHF, recommend Hospice.  She is in agreement for hospice eval.     Nonsustainted VT , asymptomatic on Tele  - Coreg    Reactive Airway Disease due to above  - Nebs and IV Steroids   - monitor     Acute Kidney Insufficiency  - Cr improved      DM  - lantus and sliding scale  - switch Januvia to Nesina      HTN  - c/w Coreg,      Hx of Breast Ca  - outpatient follow as neded      Dementia without behavioral disturbance  - stable   - Alb 2.7  - poor insight and appetite       VTE prophylaxis: Lovenox   Follow-up labs/studies: NONE  Discussed plan of care with Patient/Family, Nurse and    Disposition:  Anticipate discharge to Home with Hospice      Subjective  Pt seen and examined  Pleasant  Awake  No respiratory distress   Review of systems otherwise as above     Physical examination  Visit Vitals  /70 (BP 1 Location: Left arm, BP Patient Position: At rest)   Pulse 85   Temp 98 °F (36.7 °C)   Resp 18   Ht 5' 5\" (1.651 m)   Wt 78.4 kg (172 lb 12.8 oz)   SpO2 94%   BMI 28.76 kg/m²      Temp (24hrs), Av.9 °F (36.6 °C), Min:97.6 °F (36.4 °C), Max:98.1 °F (36.7 °C)     O2 Flow Rate (L/min): 2 l/min   O2 Device: Room air  Visit Vitals  /70 (BP 1 Location: Left arm, BP Patient Position: At rest)   Pulse 85   Temp 98 °F (36.7 °C)   Resp 18   Ht 5' 5\" (1.651 m)   Wt 78.4 kg (172 lb 12.8 oz)   SpO2 94%   BMI 28.76 kg/m²    O2 Device: Room air  Visit Vitals  /70 (BP 1 Location: Left arm, BP Patient Position: At rest)   Pulse 85   Temp 98 °F (36.7 °C)   Resp 18   Ht 5' 5\" (1.651 m)   Wt 78.4 kg (172 lb 12.8 oz)   SpO2 94%   BMI 28.76 kg/m²         Intake/Output Summary (Last 24 hours) at 2/21/2020 1136  Last data filed at 2/21/2020 0730  Gross per 24 hour   Intake 2000 ml   Output 1550 ml   Net 450 ml     Last shift:    02/21 0701 - 02/21 1900  In: 480 [P.O.:480]  Out: -   Last 3 shifts:    02/19 1901 - 02/21 0700  In: 2110 [P.O.:2110]  Out: 7721 [Urine:2575]    General:   Alert, cooperative, no acute distress   Head:   No obvious abnormalities, atraumatic   Eyes:   Conjunctivae clear   Oropharynx:  Oral mucosa normal   Neck:  Supple, trachea midline, no adenopathy   No JVD   Back:    No CVA tenderness    Chest wall:    No tenderness or deformities    Lungs:   improved air entry, occasional wheezing    Heart:   Regular rhythm,   Abdomen:    Soft, non-tender    Bowel sounds normal    No masses or organomegaly    Extremities:  No edema or DVT signs   Pulses:  Symmetric all extremities   Skin:  Warm and dry    No rashes or lesions   Neurologic:  Oriented x 1-2    No focal deficits   Urinary catheter:  deferred     Data revi      Recent Labs     02/21/20  0206 02/20/20  0425 02/19/20  1600   WBC 2.8* 3.3* 6.0   HGB 7.9* 7.6* 7.9*   HCT 23.3* 22.9* 25.4*    258 259     Recent Labs     02/21/20  0206 02/20/20  1422 02/20/20  0425 02/19/20  1600   *  --  131* 129*   K 3.1* 4.0 3.5 4.9     --  100 102   CO2 16*  --  19* 19*   *  --  88 121*   BUN 38*  --  47* 50*   CREA 1.21*  --  1.58* 1.72*   CA 6.8*  --  8.1* 8.4*   MG  --  2.0  --   --    ALB  --   --  2.9* 3.1*   TBILI  --   --  0.4 0.5   SGOT  --   --  37 54*   ALT  --   --  57 55     Current Facility-Administered Medications   Medication Dose Route Frequency  potassium bicarb-citric acid (EFFER-K) tablet 40 mEq  40 mEq Oral BID    sodium bicarbonate tablet 650 mg  650 mg Oral BID    spironolactone (ALDACTONE) tablet 25 mg  25 mg Oral DAILY    carvediloL (COREG) tablet 6.25 mg  6.25 mg Oral BID WITH MEALS    alogliptin (NESINA) tablet 12.5 mg  12.5 mg Oral DAILY    methylPREDNISolone (PF) (SOLU-MEDROL) injection 40 mg  40 mg IntraVENous Q12H    insulin lispro (HUMALOG) injection 8 Units  8 Units SubCUTAneous TIDAC    levalbuterol (XOPENEX) nebulizer soln 0.63 mg/3 mL  0.63 mg Nebulization Q4H RT    sodium chloride (NS) flush 5-40 mL  5-40 mL IntraVENous Q8H    sodium chloride (NS) flush 5-40 mL  5-40 mL IntraVENous PRN    acetaminophen (TYLENOL) tablet 650 mg  650 mg Oral Q4H PRN    naloxone (NARCAN) injection 0.4 mg  0.4 mg IntraVENous PRN    ondansetron (ZOFRAN) injection 4 mg  4 mg IntraVENous Q4H PRN    morphine injection 1 mg  1 mg IntraVENous Q4H PRN    cefepime (MAXIPIME) 2 g in 0.9% sodium chloride (MBP/ADV) 100 mL  2 g IntraVENous Q24H    bumetanide (BUMEX) injection 1 mg  1 mg IntraVENous BID    benzonatate (TESSALON) capsule 100 mg  100 mg Oral TID PRN    levothyroxine (SYNTHROID) tablet 50 mcg  50 mcg Oral ACB    melatonin tablet 10.5 mg  10.5 mg Oral QHS    losartan (COZAAR) tablet 12.5 mg  12.5 mg Oral DAILY    sertraline (ZOLOFT) tablet 100 mg  100 mg Oral DAILY    insulin lispro (HUMALOG) injection   SubCUTAneous AC&HS    glucose chewable tablet 16 g  4 Tab Oral PRN    glucagon (GLUCAGEN) injection 1 mg  1 mg IntraMUSCular PRN    dextrose 10% infusion 0-250 mL  0-250 mL IntraVENous PRN    enoxaparin (LOVENOX) injection 30 mg  30 mg SubCUTAneous Q24H     Total time spent managing care of the patient: 35 minutes.        Gabriel Garces MD   --Hospitalist, Internal Medicine

## 2020-02-21 NOTE — PROGRESS NOTES
Care Plan Summary:     Problem: Risk for Spread of Infection  Goal: Prevent transmission of infectious organism to others  Description  Prevent the transmission of infectious organisms to other patients, staff members, and visitors. Outcome: Progressing Towards Goal     Problem: Falls - Risk of  Goal: *Absence of Falls  Description  Document Aaronlala Danny Fall Risk and appropriate interventions in the flowsheet. Outcome: Progressing Towards Goal  Note: Fall Risk Interventions:  Mobility Interventions: Bed/chair exit alarm, OT consult for ADLs, Patient to call before getting OOB, PT Consult for mobility concerns, PT Consult for assist device competence, Utilize walker, cane, or other assistive device, Utilize gait belt for transfers/ambulation    Mentation Interventions: Bed/chair exit alarm, Gait belt with transfers/ambulation, Room close to nurse's station, Reorient patient, Increase mobility, More frequent rounding    Medication Interventions: Bed/chair exit alarm, Patient to call before getting OOB, Teach patient to arise slowly    Elimination Interventions: Bed/chair exit alarm, Call light in reach, Patient to call for help with toileting needs, Toileting schedule/hourly rounds, Toilet paper/wipes in reach    Problem: Pressure Injury - Risk of  Goal: *Prevention of pressure injury  Description  Document Hector Scale and appropriate interventions in the flowsheet. Outcome: Progressing Towards Goal  Note: Pressure Injury Interventions:       Moisture Interventions: Absorbent underpads, Apply protective barrier, creams and emollients, Check for incontinence Q2 hours and as needed, Internal/External urinary devices, Maintain skin hydration (lotion/cream), Minimize layers, Moisture barrier    Activity Interventions: Increase time out of bed, Pressure redistribution bed/mattress(bed type), PT/OT evaluation    Mobility Interventions: HOB 30 degrees or less, PT/OT evaluation, Turn and reposition approx.  every two hours(pillow and wedges)    Nutrition Interventions: Document food/fluid/supplement intake, Offer support with meals,snacks and hydration    Friction and Shear Interventions: Apply protective barrier, creams and emollients, HOB 30 degrees or less, Minimize layers    Problem: Pneumonia: Day 1  Goal: Off Pathway (Use only if patient is Off Pathway)  Outcome: Progressing Towards Goal  Goal: Activity/Safety  Outcome: Progressing Towards Goal  Goal: Consults, if ordered  Outcome: Progressing Towards Goal  Goal: Diagnostic Test/Procedures  Outcome: Progressing Towards Goal  Goal: Nutrition/Diet  Outcome: Progressing Towards Goal  Goal: Medications  Outcome: Progressing Towards Goal  Goal: Respiratory  Outcome: Progressing Towards Goal  Goal: Treatments/Interventions/Procedures  Outcome: Progressing Towards Goal  Goal: Psychosocial  Outcome: Progressing Towards Goal  Goal: *Oxygen saturation within defined limits  Outcome: Progressing Towards Goal  Goal: *Influenza vaccine administered (October-March)  Outcome: Progressing Towards Goal  Goal: *Pneumoccocal vaccine administered  Outcome: Not Met  Goal: *Hemodynamically stable  Outcome: Progressing Towards Goal  Goal: *Demonstrates progressive activity  Outcome: Progressing Towards Goal  Goal: *Tolerating diet  Outcome: Progressing Towards Goal     Problem: Patient Education: Go to Patient Education Activity  Goal: Patient/Family Education  Outcome: Progressing Towards Goal

## 2020-02-21 NOTE — PROGRESS NOTES
CM follow up:    Family not available during rounds. Spoke with Devon Mcleod, [facility] liaison at [phone], states she spoke with patient's daughter Graham Chadwick at [phone] who is patient's medical POA. Consent has been given to [facility] to follow patient at home when discharged. They will need notified when patient is ready for discharge. Update:  Patient will be ready for discharge tomorrow. Spoke with Dr. Liborio Pelaez, [facility] will be following patient at home. Equipment arranged to be delivered to home tomorrow between 12-2. Patient will be transported home via her son Yodit Berman [phone]. Ethelene Gauss Discharge before 12 in am would allow patient to be home for equipment delivery.     Elliot Gatica, RN, MSN/Care manager

## 2020-02-21 NOTE — CONSULTS
Palliative Medicine Consult  Devyn: [phone] ([phone])    Patient Name: Nemo Todd  YOB: 1928    Date of Initial Consult: 2020  Reason for Consult: Care decisions  Requesting Provider: Dr. Isamar Stock   Primary Care Physician: Flavio Philip MD     SUMMARY:   Nemo Todd is a 80 y.o. with a past history of diastolic heart failure, chronic kidney disease, hypertension, diabetes, depression/anxiety, who was admitted on 2020 from home with a diagnosis of pneumonia/acute on chronic heart failure. Current medical issues leading to Palliative Medicine involvement include: Care decisions. Chart reviewedpatient is a 77-year-old female who lives in her own home with 24-hour caregivers. She was admitted to the hospital earlier in the week with viral infection as well as suspected pulmonary edema related to IV fluids that she received during the initial hospitalization. She did diurese and a hospice evaluation was asked. At the time, patient was sitting on the side of the bed and had worked with PT. Her echocardiogram which was done in 2019 showed an EF of 45 to 50%. Patient recovered from the hospitalization and was discharged back home but readmitted within 2 hours secondary to increased dyspnea. She now appears to have developed a pneumonia with suspected pulmonary edema. She has been weaned off of oxygen therapy. Repeat echocardiogram now shows an EF of 20 to 25%. Our team is been asked to see her for goals of care. Social historypatient has 3 children daughter in [facility], son in [place] and daughter in [place]. She lives in a home in [place]. Her   approximately 9 years ago from complications of Alzheimer's. She enjoyed traveling with her . She originally is from [place]. Her  was in the [employer] and retired as a colonel. PALLIATIVE DIAGNOSES:   1. Goals of care discussion  2. DNR discussion  3.  Worsening left ventricular dysfunction with an EF now of 20 to 25%  4. Acute pneumonialikely viral but being treated for secondary bacterial infection  5. Debility  6. Advance care planning review  7. Shortness of breath       PLAN:   1. Edna Marshall, licensed clinical , and I met with patient and caregivers at the bedside. Patient able to engage in discussion and does seem to have a decent understanding of her current medical problems. She is having significant cough that is complicating ability to even have a conversation. We reviewed the results of her echocardiogram and the significant change. Discussion was had prior to our seeing the patient about potential hospice support when she is discharged. 2. Talked with patient's daughter(Adryan Lopez who lives in [place]) on the phone and reviewed the current medical issues. We discussed the role of palliative medicine. Discussed the results of her echocardiogram and potential overall prognosis. Explained that I think her acute symptoms right now are more related to the pneumonia/viral infection and if she were to recover from this, she may actually do okay with medical management of her congestive heart failure with the EF of 20 to 25%. Discussed hospice philosophy and what they can provide versus what they cannot provide. Daughters philosophy seems to align with hospice. She would like for mom to be comfortable at home and try to avoid readmissions. 3. Goals of careprovide comfort when patient is discharged with hospice support at home. 4. Advance care planningsee ACP note and her daughter is medical POA  5. CODE STATUSreviewed resuscitation and patient clear on no attempts at resuscitation. Caregiver at the bedside states short he has a durable DO NOT RESUSCITATE form at home so we did not complete a new one. In addition, we discussed her AICD and potentially deactivating given that her goal is comfort. Patient agrees.   Talk with Ale Cannon who will reach out to the [address] to have it deactivated  6. Symptom managementpatient with significant amount of cough. Started on scheduled Tessalon since she states this helped earlier in the week. 7. Psychosocialpatient appears of good support from family even though they do not live locally and very good caregivers. Her son is flying into town either later today or tomorrow. 8. Discussed with bedside nurse, case management, Dr. Nawaf Guido  9. Initial consult note routed to primary continuity provider and/or primary health care team members  10. Communicated plan of care with: Palliative IDTDolly 192 Team     GOALS OF CARE / TREATMENT PREFERENCES:     GOALS OF CARE:  Patient/Health Care Proxy Stated Goals: Other (comment)(hospice care at discharge)    TREATMENT PREFERENCES:   Code Status: DNR    Advance Care Planning:  [x] [facility] Interdisciplinary Team has updated the ACP Navigator with [place] and Patient Capacity      Advance Care Planning 2/21/2020   Patient's Healthcare Decision Maker is: Named in scanned ACP document   Primary Decision Maker Name -   Primary Decision Maker Relationship to Patient -   Secondary Decision Maker Name -   Secondary Decision Maker Relationship to Patient -   Confirm Advance Directive Yes, on file   Patient Would Like to Complete Advance Directive -   Does the patient have other document types Do Not Resuscitate       Medical Interventions: Limited additional interventions     Other Instructions:   Artificially Administered Nutrition: No feeding tube     Other:    As far as possible, the palliative care team has discussed with patient / health care proxy about goals of care / treatment preferences for patient. HISTORY:     History obtained from: Chart, patient    CHIEF COMPLAINT: Shortness of breath    HPI/SUBJECTIVE:    The patient is:   [x] Verbal and participatory  [] Non-participatory due to:   Patient still with cough.   She still admits to not feeling well.  Slight sputum production. Clinical Pain Assessment (nonverbal scale for severity on nonverbal patients):   Clinical Pain Assessment  Severity: 0          Duration: for how long has pt been experiencing pain (e.g., 2 days, 1 month, years)  Frequency: how often pain is an issue (e.g., several times per day, once every few days, constant)     FUNCTIONAL ASSESSMENT:     Palliative Performance Scale (PPS):  PPS: 60       PSYCHOSOCIAL/SPIRITUAL SCREENING:     Palliative IDT has assessed this patient for cultural preferences / practices and a referral made as appropriate to needs (Cultural Services, Patient Advocacy, Ethics, etc.)    Any spiritual / Presybeterian concerns:  [] Yes /  [x] No    Caregiver Burnout:  [] Yes /  [x] No /  [] No Caregiver Present      Anticipatory grief assessment:   [x] Normal  / [] Maladaptive       ESAS Anxiety: Anxiety: 1    ESAS Depression: Depression: 0        REVIEW OF SYSTEMS:     Positive and pertinent negative findings in ROS are noted above in HPI. The following systems were [x] reviewed / [] unable to be reviewed as noted in HPI  Other findings are noted below. Systems: constitutional, ears/nose/mouth/throat, respiratory, gastrointestinal, genitourinary, musculoskeletal, integumentary, neurologic, psychiatric, endocrine. Positive findings noted below. Modified ESAS Completed by: provider   Fatigue: 2 Drowsiness: 0   Depression: 0 Pain: 0   Anxiety: 1 Nausea: 0   Anorexia: 0 Dyspnea: 4     Constipation: No              PHYSICAL EXAM:     From RN flowsheet:  Wt Readings from Last 3 Encounters:   02/21/20 172 lb 12.8 oz (78.4 kg)   02/19/20 182 lb 8.7 oz (82.8 kg)   01/23/20 172 lb (78 kg)     Blood pressure 127/72, pulse 93, temperature 98.1 °F (36.7 °C), resp. rate 20, height 5' 5\" (1.651 m), weight 172 lb 12.8 oz (78.4 kg), SpO2 94 %.     Pain Scale 1: Numeric (0 - 10)  Pain Intensity 1: 0                 Last bowel movement, if known:     Constitutional: Alert and oriented, coughing extensively, no acute distress  Eyes: pupils equal, anicteric  ENMT: no nasal discharge, moist mucous membranes  Cardiovascular: regular rhythm, distal pulses intact  Respiratory: breathing slightly labored, symmetric, slight wheezing  Gastrointestinal: soft non-tender, +bowel sounds  Musculoskeletal: no deformity, no tenderness to palpation  Skin: warm, dry  Neurologic: following commands, moving all extremities  Psychiatric: full affect, no hallucinations  Other:       HISTORY:     Principal Problem:    Acute on chronic systolic CHF (congestive heart failure) (HCC) (2/19/2020)    Active Problems:    HTN (hypertension), benign ()      Biventricular automatic implantable cardioverter defibrillator in situ (1/3/2018)      Type 2 diabetes mellitus with nephropathy ([address] (1/3/2018)      Macrocytic anemia (7/31/2019)      Hypothyroidism ()      Non-ischemic cardiomyopathy ([facility]) ()      Overview: Cath 2/16/15 - Mild (non-obstructive) CAD,LVEF 30%, 2+ MR      Dementia ([facility]) ()      Hyponatremia with extracellular fluid depletion (2/17/2020)      Pneumonia (2/19/2020)      Pneumonia due to human metapneumovirus (2/19/2020)      CKD (chronic kidney disease) stage 3, GFR 30-59 ml/min ([facility]) (2/19/2020)      Past Medical History:   Diagnosis Date    Arthritis     Breast cancer ([address]     Cerebral microvascular disease     Dementia ([address]     Diabetes ([address]     Dyslipidemia     GERD (gastroesophageal reflux disease)     Hearing loss     Hypertension     Hypothyroid     LBBB (left bundle branch block)     Melanoma ([address]     Non-ischemic cardiomyopathy ([address]     Cath 2/16/15 - Mild (non-obstructive) CAD,LVEF 30%, 2+ MR    Skin cancer     melanoma on legs    Sleep apnea     wears O2 at night- cannot tolerate CPAP    Systolic CHF ([address]     Admission 2/13/16      Past Surgical History:   Procedure Laterality Date    HX BREAST LUMPECTOMY  2003    right    HX CATARACT REMOVAL  1994    bilateral    HX CHOLECYSTECTOMY      HX DILATION AND CURETTAGE  1960    HX HEART CATHETERIZATION      x2- no blockages    HX HEENT  2004    surgery on upper jaw    HX HYSTERECTOMY  1967    HX HYSTEROSCOPY      HX OOPHORECTOMY  1967    left    HX ORTHOPAEDIC  2010    right knee replacement    HX ORTHOPAEDIC      repair torn meniscus left knee    HX ORTHOPAEDIC  1996    repair knuckle on left pinky finger    HX OTHER SURGICAL  2000    remove benign tumore right side between rib and right lung    HX PACEMAKER      HX PARTIAL THYROIDECTOMY      HX UROLOGICAL  2003    bladder surgery x2      Family History   Problem Relation Age of Onset    Stroke Mother       History reviewed, no pertinent family history. Social History     Tobacco Use    Smoking status: Never Smoker    Smokeless tobacco: Never Used   Substance Use Topics    Alcohol use:  Yes     Alcohol/week: 4.0 standard drinks     Types: 3 Glasses of wine, 1 Shots of liquor per week     Allergies   Allergen Reactions    Beta-Blockers (Beta-Adrenergic Blocking Agts) Unknown (comments)     Made her feel tired, breakout on her back    Other Medication Unknown (comments)     Bromides    Phenobarbital Unknown (comments)    Sulfa (Sulfonamide Antibiotics) Swelling     rash      Current Facility-Administered Medications   Medication Dose Route Frequency    potassium bicarb-citric acid (EFFER-K) tablet 40 mEq  40 mEq Oral BID    sodium bicarbonate tablet 650 mg  650 mg Oral BID    spironolactone (ALDACTONE) tablet 25 mg  25 mg Oral DAILY    carvediloL (COREG) tablet 6.25 mg  6.25 mg Oral BID WITH MEALS    alogliptin (NESINA) tablet 12.5 mg  12.5 mg Oral DAILY    methylPREDNISolone (PF) (SOLU-MEDROL) injection 40 mg  40 mg IntraVENous Q12H    insulin lispro (HUMALOG) injection 8 Units  8 Units SubCUTAneous TIDAC    benzonatate (TESSALON) capsule 100 mg  100 mg Oral TID    bumetanide (BUMEX) injection 1 mg  1 mg IntraVENous BID    [START ON 2/22/2020] bumetanide (BUMEX) tablet 1 mg  1 mg Oral DAILY    levalbuterol (XOPENEX) nebulizer soln 0.63 mg/3 mL  0.63 mg Nebulization Q4H RT    sodium chloride (NS) flush 5-40 mL  5-40 mL IntraVENous Q8H    sodium chloride (NS) flush 5-40 mL  5-40 mL IntraVENous PRN    acetaminophen (TYLENOL) tablet 650 mg  650 mg Oral Q4H PRN    naloxone (NARCAN) injection 0.4 mg  0.4 mg IntraVENous PRN    ondansetron (ZOFRAN) injection 4 mg  4 mg IntraVENous Q4H PRN    morphine injection 1 mg  1 mg IntraVENous Q4H PRN    cefepime (MAXIPIME) 2 g in 0.9% sodium chloride (MBP/ADV) 100 mL  2 g IntraVENous Q24H    levothyroxine (SYNTHROID) tablet 50 mcg  50 mcg Oral ACB    melatonin tablet 10.5 mg  10.5 mg Oral QHS    losartan (COZAAR) tablet 12.5 mg  12.5 mg Oral DAILY    sertraline (ZOLOFT) tablet 100 mg  100 mg Oral DAILY    insulin lispro (HUMALOG) injection   SubCUTAneous AC&HS    glucose chewable tablet 16 g  4 Tab Oral PRN    glucagon (GLUCAGEN) injection 1 mg  1 mg IntraMUSCular PRN    dextrose 10% infusion 0-250 mL  0-250 mL IntraVENous PRN    enoxaparin (LOVENOX) injection 30 mg  30 mg SubCUTAneous Q24H          LAB AND IMAGING FINDINGS:     Lab Results   Component Value Date/Time    WBC 2.8 (L) 02/21/2020 02:06 AM    HGB 7.9 (L) 02/21/2020 02:06 AM    PLATELET 791 86/92/4020 02:06 AM     Lab Results   Component Value Date/Time    Sodium 135 (L) 02/21/2020 02:06 AM    Potassium 3.1 (L) 02/21/2020 02:06 AM    Chloride 108 02/21/2020 02:06 AM    CO2 16 (L) 02/21/2020 02:06 AM    BUN 38 (H) 02/21/2020 02:06 AM    Creatinine 1.21 (H) 02/21/2020 02:06 AM    Calcium 6.8 (L) 02/21/2020 02:06 AM    Magnesium 2.0 02/20/2020 02:22 PM    Phosphorus 2.6 12/27/2019 12:49 AM      Lab Results   Component Value Date/Time    AST (SGOT) 37 02/20/2020 04:25 AM    Alk.  phosphatase 50 02/20/2020 04:25 AM    Protein, total 6.4 02/20/2020 04:25 AM    Albumin 2.9 (L) 02/20/2020 04:25 AM    Globulin 3.5 02/20/2020 04:25 AM     Lab Results   Component Value Date/Time    INR 1.1 02/17/2020 01:42 PM    Prothrombin time 10.8 02/17/2020 01:42 PM    aPTT 28.5 10/15/2010 11:00 AM      Lab Results   Component Value Date/Time    Iron 109 06/06/2019 02:42 PM    TIBC 395 06/06/2019 02:42 PM    Iron % saturation 28 06/06/2019 02:42 PM    Ferritin 54 08/01/2019 05:20 AM      No results found for: PH, PCO2, PO2  No components found for: [place]   Lab Results   Component Value Date/Time     02/14/2016 05:35 AM    CK - MB 2.6 02/14/2016 05:35 AM                Total time: 70  Counseling / coordination time, spent as noted above: 65  > 50% counseling / coordination?: yes    Prolonged service was provided for  []30 min   []75 min in face to face time in the presence of the patient, spent as noted above. Time Start:   Time End:   Note: this can only be billed with 24838 (initial) or 89017 (follow up). If multiple start / stop times, list each separately.

## 2020-02-21 NOTE — PROGRESS NOTES
Clinical Shift Summary:     1900: Bedside and Verbal shift change report given to Jaylene Matt RN (oncoming nurse) by Royal Pizarro RN (offgoing nurse). Report included the following information SBAR, Kardex, ED Summary, Intake/Output, MAR, Accordion, Recent Results, Med Rec Status and Cardiac Rhythm V-paced. 2100: Provided update to patient's son, including current status, visitation protocol, etc.     2311: Administered PRN Tessalon PO (see MAR) for congested, hacking, strong cough. 0212: Patient c/o mild pain, located at her ribcage and abdomen. Administered PRN Tylenol PO (see MAR). 0430: MCT notified RN that patient's pacemaker may not be working and her HR converted to N-SVT w/ HR in the 140s. Assessed patient. HR was in the 80s-90s V-paced w/PVCs. Conducted a 12-lead EKG which was negative. Will continue to monitor. 0600: Though melatonin was administered at 2247, patient has not slept. Patient is restless, and anxious. 0700: Bedside and Verbal shift change report given to Elvia Flores RN and JONN HurdN Student (oncoming nurse) by Jaylene Matt RN (offgoing nurse). Report included the following information SBAR, Kardex, ED Summary, Intake/Output, MAR, Accordion, Recent Results, Med Rec Status and Cardiac Rhythm V-paced.

## 2020-02-21 NOTE — PROGRESS NOTES
Problem: Mobility Impaired (Adult and Pediatric)  Goal: *Acute Goals and Plan of Care (Insert Text)  Description  FUNCTIONAL STATUS PRIOR TO ADMISSION: Patient was modified independent using a rolling walker for functional mobility. HOME SUPPORT PRIOR TO ADMISSION: The patient lived with care aids. Physical Therapy Goals  Initiated 2/21/2020  1. Patient will move from supine to sit and sit to supine  in bed with supervision/set-up within 7 day(s). 2.  Patient will transfer from bed to chair and chair to bed with supervision/set-up using the least restrictive device within 7 day(s). 3.  Patient will perform sit to stand with supervision/set-up within 7 day(s). 4.  Patient will ambulate with supervision/set-up for 150 feet with the least restrictive device within 7 day(s). 5.  Patient will ascend/descend 4 stairs with 2 handrail(s) with minimal assistance/contact guard assist within 7 day(s). Outcome: Progressing Towards Goal  Note:   PHYSICAL THERAPY EVALUATION  Patient: Maria Luz Lawson (42 y.o. female)  Date: 2/21/2020  Primary Diagnosis: Acute systolic CHF (congestive heart failure) ([facility] [I50.21]        Precautions: fall         ASSESSMENT  Based on the objective data described below, the patient presents with decreased endurance and increased shortness of breath following re-admission for CHF exacerbation. Patient just discharged 2 days ago and only 1 hour at home and then re-admitted. Patient has full time care givers (24/7) at home. Patient was Supervision using a RW. Patient dyspneic and required seated rest break but sats 96% on room air and heart rate 98 bpm.     Functional Outcome Measure: The patient scored Total: 65/100 on the Barthel Index which is indicative of 35% impaired ability to care for basic self needs/dependency on others.      Other factors to consider for discharge: has care aids 24/7     Patient will benefit from skilled therapy intervention to address the above noted impairments. PLAN :  Recommendations and Planned Interventions: bed mobility training, transfer training, gait training, therapeutic exercises, neuromuscular re-education, and therapeutic activities      Frequency/Duration: Patient will be followed by physical therapy:  2 times a week to address goals. Recommendation for discharge: (in order for the patient to meet his/her long term goals)  Physical therapy at least 2 days/week in the home     This discharge recommendation:  A follow-up discussion with the attending provider and/or case management is planned    IF patient discharges home will need the following DME: patient owns DME required for discharge         SUBJECTIVE:   Patient stated im short of breath.     OBJECTIVE DATA SUMMARY:   HISTORY:    Past Medical History:   Diagnosis Date    Arthritis     Breast cancer ([facility]     Cerebral microvascular disease     Dementia ([facility]     Diabetes ([facility]     Dyslipidemia     GERD (gastroesophageal reflux disease)     Hearing loss     Hypertension     Hypothyroid     LBBB (left bundle branch block)     Melanoma ([facility]     Non-ischemic cardiomyopathy ([facility]     Cath 2/16/15 - Mild (non-obstructive) CAD,LVEF 30%, 2+ MR    Skin cancer     melanoma on legs    Sleep apnea     wears O2 at night- cannot tolerate CPAP    Systolic CHF ([facility]     Admission 2/13/16     Past Surgical History:   Procedure Laterality Date    HX BREAST LUMPECTOMY  2003    right    HX CATARACT REMOVAL  1994    bilateral    HX CHOLECYSTECTOMY      HX DILATION AND CURETTAGE  1960    HX HEART CATHETERIZATION      x2- no blockages    HX HEENT  2004    surgery on upper jaw    HX HYSTERECTOMY  1967    HX HYSTEROSCOPY      HX OOPHORECTOMY  1967    left    HX ORTHOPAEDIC  2010    right knee replacement    HX ORTHOPAEDIC      repair torn meniscus left knee    HX ORTHOPAEDIC  1996    repair knuckle on left pinky finger    HX OTHER SURGICAL  2000    remove benign tumore right side between rib and right lung HX PACEMAKER      HX PARTIAL THYROIDECTOMY      HX UROLOGICAL  2003    bladder surgery x2       Personal factors and/or comorbidities impacting plan of care: see above    Home Situation  Home Environment: Independent living  One/Two Story Residence: One story  Living Alone: Yes  Support Systems: Home care staff  Patient Expects to be Discharged to[de-identified] Independent living facility  Current DME Used/Available at Home: Wheelchair    EXAMINATION/PRESENTATION/DECISION MAKING:   Critical Behavior:  Neurologic State: Alert, Restless  Orientation Level: Oriented to person, Disoriented to situation, Disoriented to time  Cognition: Follows commands     Hearing: Auditory  Auditory Impairment: None  Skin:  all exposed intact  Edema: none noted  Range Of Motion:  AROM: Within functional limits           PROM: Within functional limits           Strength:    Strength: Within functional limits                    Tone & Sensation:   Tone: Normal                              Coordination:  Coordination: Within functional limits  Vision:      Functional Mobility:  Bed Mobility:  Rolling: Modified independent  Supine to Sit: Modified independent  Sit to Supine: Modified independent     Transfers:  Sit to Stand: Supervision  Stand to Sit: Supervision        Bed to Chair: Supervision              Balance:   Sitting: Intact  Standing: Intact  Ambulation/Gait Training:  Distance (ft): 20 Feet (ft)  Assistive Device: Walker, rolling;Gait belt    Functional Measure:  Barthel Index:    Bathin  Bladder: 10  Bowels: 10  Groomin  Dressing: 10  Feeding: 10  Mobility: 0  Stairs: 0  Toilet Use: 5  Transfer (Bed to Chair and Back): 10  Total: 65/100       The Barthel ADL Index: Guidelines  1. The index should be used as a record of what a patient does, not as a record of what a patient could do. 2. The main aim is to establish degree of independence from any help, physical or verbal, however minor and for whatever reason.   3. The need for supervision renders the patient not independent. 4. A patient's performance should be established using the best available evidence. Asking the patient, friends/relatives and nurses are the usual sources, but direct observation and common sense are also important. However direct testing is not needed. 5. Usually the patient's performance over the preceding 24-48 hours is important, but occasionally longer periods will be relevant. 6. Middle categories imply that the patient supplies over 50 per cent of the effort. 7. Use of aids to be independent is allowed. Momo Raymond., Barthel, DHarryW. ([id number]). Functional evaluation: the Barthel Index. [address] [facility] (14)2. Carlos Shea humberto FAREED Escalera, Fatoumata Hi., Marbin Hernandez., Marcelo, [address] [place] [address] Ave (1999). Measuring the change indisability after inpatient rehabilitation; comparison of the responsiveness of the Barthel Index and Functional Archuleta Measure. Journal of Neurology, Neurosurgery, and Psychiatry, 66(4), 727-645. HARLAN Casarez.JAGDISH, DAVID Payne, & Ciro Mcdonnell M.A. (2004.) Assessment of post-stroke quality of life in cost-effectiveness studies: The usefulness of the Barthel Index and the EuroQoL-5D. Quality of Life Research, 15, 591-97        Physical Therapy Evaluation Charge Determination   History Examination Presentation Decision-Making   HIGH Complexity :3+ comorbidities / personal factors will impact the outcome/ POC  MEDIUM Complexity : 3 Standardized tests and measures addressing body structure, function, activity limitation and / or participation in recreation  MEDIUM Complexity : Evolving with changing characteristics  Other outcome measures barthel index  MEDIUM      Based on the above components, the patient evaluation is determined to be of the following complexity level: MEDIUM    Pain Rating:  None reported    Activity Tolerance:   Fair  Please refer to the flowsheet for vital signs taken during this treatment.     After treatment patient left in no apparent distress:   Sitting in chair, Call bell within reach, Bed / chair alarm activated, and Caregiver / family present    COMMUNICATION/EDUCATION:   The patients plan of care was discussed with: Occupational Therapist and Registered Nurse. Fall prevention education was provided and the patient/caregiver indicated understanding., Patient/family have participated as able in goal setting and plan of care. , and Patient/family agree to work toward stated goals and plan of care.     Thank you for this referral.  Daphne Arellano PT, DPT   Time Calculation: 12 mins

## 2020-02-21 NOTE — ACP (ADVANCE CARE PLANNING)
Advance Care Planning 2/21/2020   Patient's Healthcare Decision Maker is: Named in scanned ACP document   Primary Decision Maker Name Prachi Benton, [phone]   Primary Decision Maker Relationship to Patient Dtr   Secondary Decision Maker Name -   Secondary Decision Maker Relationship to Patient -   Confirm Advance Directive Yes, on file   Patient Would Like to Complete Advance Directive Already in place   Does the patient have other document types Do Not Resuscitate     Pt has AMD on file dated 8/6/2019 which appoints dtr Prachi Benton as sole Medical POA. Pt confirmed that she would not want attempts at resuscitation in the event of cardiac/respiratory arrest, already has inpt DNR order in place, caregiver reports pt has DDNR form at home as well, stated family will bring copy for chart. Pt/family expressed interest in possibly pursuing hospice care in light of recent Echo results.

## 2020-02-21 NOTE — PROGRESS NOTES
Palliative Medicine      Code Status: DNR    Advance Care Planning:    Advance Care Planning 2020   Patient's Healthcare Decision Maker is: Named in scanned ACP document   Primary Decision Maker Name Charlette Fragoso, [phone]   Primary Decision Maker Relationship to Patient Dtr   Secondary Decision Maker Name -   Secondary Decision Maker Relationship to Patient -   Confirm Advance Directive Yes, on file   Patient Would Like to Complete Advance Directive Already in place   Does the patient have other document types Do Not Resuscitate       Patient / Family Encounter Documentation    Participants (names): Pt, [facility] caregiver, friend Joana Linares (identified herself as a dtr but is not a relative, cared for pt's  before his death), Palliative Medicine (Dr. Minal Rivas, Evi Caro)    Narrative: Pt was up in chair, alert and talkative. Pt is originally from [place], lived in [place] for a time after getting , later moved with  to the [address] [place] [address],[address]. Pt has three children:  dtr in [facility], dtr in [facility], and son in [place]. Son is expected to arrive in town tomorrow for a visit. 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   of complications from Alzheimer's. Pt shared that their long term care insurance covered several years in a memory center for , is covering her caregivers as well. Pt has AMD on file dated 2019 which appoints dtr Charlette Fragoso as sole Medical POA. Pt confirmed that she would not want attempts at resuscitation in the event of cardiac/respiratory arrest, already has inpt DNR order in place, caregiver reports pt has DDNR form at home as well, stated family will bring copy for chart. Pt/family expressed interest in possibly pursuing hospice care in light of recent Echo results. Psychosocial Issues Identified/ Resilience Factors: Coping with deteriorating health and repeated hospitalizations. Pt has good support in place from family, friends and paid caregivers. Goals of Care / Plan: Dr. Chau Flores reached out to dtr/POA to discuss further. [facility] to meet with pt to discuss hospice services further. SW will be available for additional support/assistance, as needed. Thank you for including Palliative Medicine in the care of Ms. Miguel A Meneses.     Deniz  TOMASA Diaz, [facility] Hospital[facility]-SW  [phone])

## 2020-02-21 NOTE — PROGRESS NOTES
Cardiology Progress Note         NAME:  Merline Montenegro   :   1928   MRN:   [id number]     Assessment/Plan:   Assessment  · NICM, Acute on chronic HFrEF s/p ICD  · NSVT  · Recent metapneumonvirus resp infection complicated by PNA  · Renal insufficiency   · Hypokalemia   · Anemia of chronic disease  · Frail        Discussion/Recommendations:  Complex case with multiple comorbid conditions  Marked interval decrease in LVEF now 20-25%  Renal fx improving with IV loops; unsure if Is/Os accurate- cont 1mg IV BID  VT asymptomatic   Replete potassium PRN; goal K>4, Mg >2; start spironolactone 25mg/d  Cont Coreg 6.25mg BID and losartan 12.5mg/d  Palliative consulted given worsening LVEF and recent hospitalizations- appreciate their recs   Management of airway disease / PNA per primary care team      CARDIOLOGY ATTENDING  Patient personally seen and examined. All the elements of history and examination were personally performed. Assessment and plan was discussed and agree as written above    Ms. Carolina Cruz feels weak and is bothered by her cough. Tele showed sustained wide complex tachy (suspect VT) for 4 min overnight. She has decided to enter hospice and asked that her ICD be turned off. For her acute on chronic CHF, will continue IV Bumex today - switching to PO Bumex tomorrow. Tracey Hope MD, [facility]          Subjective:     Pt continues to be very SOB. Had trouble sleeping overnight with ongoing cough, orthopnea, and PND. Feels about the same as on admission. Making good UOP with IV loops. Patient denies any chest pain, palpitations, dizziness, pre-syncope or syncope. Feels leg swelling is baseline. Cardiac Eval:   20   ECHO ADULT FOLLOW-UP OR LIMITED 2020    Narrative · Normal cavity size and wall thickness. Severe systolic function. Estimated left ventricular ejection fraction is 20 - 25%. Visually   measured ejection fraction.  Abnormal left ventricular septal motion   consistent with right ventricular pacing. Signed by: Con Daigle MD     XR Results (most recent):  Results from Hospital Encounter encounter on 02/19/20   XR CHEST PA LAT    Narrative Exam:  2 view chest    Indication: Evaluate volume overload. COMPARISON: 2/19/2020 radiograph and CT    PA and lateral views demonstrate heart size is at the upper limits of normal.  ICD is in place. The lungs demonstrate pulmonary vascular congestion. No definite pulmonary  edema. Patchy airspace opacities in right mid and lower lung zone are consistent with  pneumonia. Follow-up to resolution is suggested. No pleural effusions. Impression IMPRESSION:  1. Patchy airspace opacities in right mid lower lung zone are consistent with  pneumonia. 2. There is mild pulmonary vascular congestion. No definite pulmonary edema     CT Results (most recent):  Results from Hospital Encounter encounter on 02/19/20   CTA CHEST W OR W WO CONT    Narrative INDICATION:  CP/ SOB worsening x 3 days;     EXAM:  CTA Chest with contrast for Pulmonary Embolus    COMPARISON:  February 13, 2016    TECHNIQUE:  Following the uneventful intravenous administration of IV contrast  thin helical axial images were obtained through the chest. 3D image  postprocessing was performed. CT dose reduction was achieved through use of a  standardized protocol tailored for this examination and automatic exposure  control for dose modulation. FINDINGS:    THYROID: Nodular right greater than left similar to prior study. MEDIASTINUM/SYLVAIN: Scattered nonenlarged lymph nodes. Left subclavian pacemaker. HEART/PERICARDIUM: No cardiomegaly or significant pericardial effusion. AORTA:  No aneurysm. PULMONARY ARTERIES:No pulmonary embolism. LUNGS/PLEURA: Patchy airspace disease in the posterior right upper lobe and  posterior right lower lobe. Minimal nodular airspace disease anterior left lower  lobe.  No pulmonary edema or pleural effusion. 6 mm nodule lateral left lower  lobe (image 29) essentially unchanged. INCIDENTALLY IMAGED UPPER ABDOMEN: No significant abnormality. BONES: No destructive bone lesion. Healed right-sided rib fractures. ADDITIONAL COMMENTS:  N/A      Impression IMPRESSION:    1. No acute pulmonary embolus. 2. Patchy bilateral airspace disease right greater than left. 3. 6 mm left lower lobe pulmonary nodule essentially unchanged. Objective:     Visit Vitals  /65 (BP 1 Location: Left arm, BP Patient Position: At rest)   Pulse 92   Temp 97.3 °F (36.3 °C)   Resp 18   Ht 5' 5\" (1.651 m)   Wt 78.4 kg (172 lb 12.8 oz)   SpO2 94%   BMI 28.76 kg/m²    O2 Flow Rate (L/min): 2 l/min O2 Device: Room air    Temp (24hrs), Av.8 °F (36.6 °C), Min:97.3 °F (36.3 °C), Max:98.1 °F (36.7 °C)     07 -  1900  In: 480 [P.O.:480]  Out: 250 [Urine:250]    1901 -  0700  In: 2110 [P.O.:2110]  Out: 0013 [Urine:2575]     Telemetry: ventricular paced rhythm, episodes of NSVT    General: A&O cooperative, conversational dyspnea   HEENT: Atraumatic. Pink and moist.  Anicteric sclerae. Neck : Supple, elevated JVP  Lungs: diminished air movement bilaterally; no obvious crackles or wheezine. Heart: Regular rhythm, no murmur, no rubs, no gallops. .   Abdomen: Soft, non-distended, non-tender. + Bowel sounds. Extremities: 1+ HEBERT, no clubbing, no cyanosis. No calf tenderness  Neurologic: Grossly intact. No acute neurological distress. Psych:  Not anxious nor agitated. Additional comments: None    Care Plan discussed with:    Comments   Patient x    Family      RN x    Care Manager                    Consultant:          Data Review:     No lab exists for component: ITNL   Recent Labs     20  0425 20  2235 20  1600   TROIQ 0.22* 0.23* 0.30*     Results for Jorge Mtz (MRN [id number]) as of 2020 11:58   Ref.  Range 2019 03:50 2019 11:16 2019 12:03 2020 13:52 2/19/2020 16:00   NT pro-BNP Latest Ref Range: <450 PG/ML 1,666 (H) 1,129 (H) 538 (H) 4,247 (H) 34,924 (H)       Recent Labs     02/21/20  0206 02/20/20  1422 02/20/20  0425 02/19/20  1600   *  --  131* 129*   K 3.1* 4.0 3.5 4.9     --  100 102   CO2 16*  --  19* 19*   BUN 38*  --  47* 50*   CREA 1.21*  --  1.58* 1.72*   *  --  88 121*   MG  --  2.0  --   --    ALB  --   --  2.9* 3.1*   WBC 2.8*  --  3.3* 6.0   HGB 7.9*  --  7.6* 7.9*   HCT 23.3*  --  22.9* 25.4*     --  258 259     No results for input(s): INR, PTP, APTT, INREXT in the last 72 hours.     Medications reviewed  Current Facility-Administered Medications   Medication Dose Route Frequency    potassium bicarb-citric acid (EFFER-K) tablet 40 mEq  40 mEq Oral BID    sodium bicarbonate tablet 650 mg  650 mg Oral BID    spironolactone (ALDACTONE) tablet 25 mg  25 mg Oral DAILY    carvediloL (COREG) tablet 6.25 mg  6.25 mg Oral BID WITH MEALS    alogliptin (NESINA) tablet 12.5 mg  12.5 mg Oral DAILY    methylPREDNISolone (PF) (SOLU-MEDROL) injection 40 mg  40 mg IntraVENous Q12H    insulin lispro (HUMALOG) injection 8 Units  8 Units SubCUTAneous TIDAC    levalbuterol (XOPENEX) nebulizer soln 0.63 mg/3 mL  0.63 mg Nebulization Q4H RT    sodium chloride (NS) flush 5-40 mL  5-40 mL IntraVENous Q8H    sodium chloride (NS) flush 5-40 mL  5-40 mL IntraVENous PRN    acetaminophen (TYLENOL) tablet 650 mg  650 mg Oral Q4H PRN    naloxone (NARCAN) injection 0.4 mg  0.4 mg IntraVENous PRN    ondansetron (ZOFRAN) injection 4 mg  4 mg IntraVENous Q4H PRN    morphine injection 1 mg  1 mg IntraVENous Q4H PRN    cefepime (MAXIPIME) 2 g in 0.9% sodium chloride (MBP/ADV) 100 mL  2 g IntraVENous Q24H    bumetanide (BUMEX) injection 1 mg  1 mg IntraVENous BID    benzonatate (TESSALON) capsule 100 mg  100 mg Oral TID PRN    levothyroxine (SYNTHROID) tablet 50 mcg  50 mcg Oral ACB    melatonin tablet 10.5 mg  10.5 mg Oral QHS    losartan (COZAAR) tablet 12.5 mg  12.5 mg Oral DAILY    sertraline (ZOLOFT) tablet 100 mg  100 mg Oral DAILY    insulin lispro (HUMALOG) injection   SubCUTAneous AC&HS    glucose chewable tablet 16 g  4 Tab Oral PRN    glucagon (GLUCAGEN) injection 1 mg  1 mg IntraMUSCular PRN    dextrose 10% infusion 0-250 mL  0-250 mL IntraVENous PRN    enoxaparin (LOVENOX) injection 30 mg  30 mg SubCUTAneous Q24H         Sedonia Schlatter, NP

## 2020-02-21 NOTE — ADVANCED PRACTICE NURSE
Discussed with Dr Nora Coon. Pt going to transition to Hospice and ICD shock therapies to be turned off. Order placed in chart for [facility] rep to proceed.

## 2020-02-22 VITALS
SYSTOLIC BLOOD PRESSURE: 137 MMHG | TEMPERATURE: 98 F | WEIGHT: 172.8 LBS | OXYGEN SATURATION: 92 % | HEIGHT: 65 IN | RESPIRATION RATE: 16 BRPM | DIASTOLIC BLOOD PRESSURE: 74 MMHG | HEART RATE: 85 BPM | BODY MASS INDEX: 28.79 KG/M2

## 2020-02-22 LAB
GLUCOSE BLD STRIP.AUTO-MCNC: 244 MG/DL (ref 65–100)
SERVICE CMNT-IMP: ABNORMAL

## 2020-02-22 PROCEDURE — 94640 AIRWAY INHALATION TREATMENT: CPT

## 2020-02-22 PROCEDURE — 74011636637 HC RX REV CODE- 636/637: Performed by: HOSPITALIST

## 2020-02-22 PROCEDURE — 74011250637 HC RX REV CODE- 250/637: Performed by: SPECIALIST

## 2020-02-22 PROCEDURE — 74011250637 HC RX REV CODE- 250/637: Performed by: NURSE PRACTITIONER

## 2020-02-22 PROCEDURE — 82962 GLUCOSE BLOOD TEST: CPT

## 2020-02-22 PROCEDURE — 74011636637 HC RX REV CODE- 636/637: Performed by: INTERNAL MEDICINE

## 2020-02-22 PROCEDURE — 74011250637 HC RX REV CODE- 250/637: Performed by: INTERNAL MEDICINE

## 2020-02-22 PROCEDURE — 94760 N-INVAS EAR/PLS OXIMETRY 1: CPT

## 2020-02-22 PROCEDURE — 74011250637 HC RX REV CODE- 250/637: Performed by: HOSPITALIST

## 2020-02-22 PROCEDURE — 74011250637 HC RX REV CODE- 250/637: Performed by: FAMILY MEDICINE

## 2020-02-22 PROCEDURE — 74011250636 HC RX REV CODE- 250/636: Performed by: HOSPITALIST

## 2020-02-22 PROCEDURE — 74011000250 HC RX REV CODE- 250: Performed by: HOSPITALIST

## 2020-02-22 RX ORDER — SPIRONOLACTONE 25 MG/1
25 TABLET ORAL DAILY
Qty: 30 TAB | Refills: 0 | Status: SHIPPED | OUTPATIENT
Start: 2020-02-23

## 2020-02-22 RX ORDER — BUMETANIDE 1 MG/1
1 TABLET ORAL DAILY
Qty: 30 TAB | Refills: 0 | Status: SHIPPED | OUTPATIENT
Start: 2020-02-23

## 2020-02-22 RX ADMIN — INSULIN LISPRO 8 UNITS: 100 INJECTION, SOLUTION INTRAVENOUS; SUBCUTANEOUS at 08:16

## 2020-02-22 RX ADMIN — SERTRALINE HYDROCHLORIDE 100 MG: 50 TABLET ORAL at 08:13

## 2020-02-22 RX ADMIN — LEVOTHYROXINE SODIUM 50 MCG: 0.05 TABLET ORAL at 08:13

## 2020-02-22 RX ADMIN — ALOGLIPTIN 12.5 MG: 12.5 TABLET, FILM COATED ORAL at 08:43

## 2020-02-22 RX ADMIN — SODIUM BICARBONATE 650 MG: 650 TABLET ORAL at 08:12

## 2020-02-22 RX ADMIN — LOSARTAN POTASSIUM 12.5 MG: 25 TABLET, FILM COATED ORAL at 08:12

## 2020-02-22 RX ADMIN — LEVALBUTEROL HYDROCHLORIDE 0.63 MG: 0.63 SOLUTION RESPIRATORY (INHALATION) at 04:21

## 2020-02-22 RX ADMIN — SPIRONOLACTONE 25 MG: 25 TABLET ORAL at 08:12

## 2020-02-22 RX ADMIN — METHYLPREDNISOLONE SODIUM SUCCINATE 40 MG: 40 INJECTION, POWDER, FOR SOLUTION INTRAMUSCULAR; INTRAVENOUS at 08:12

## 2020-02-22 RX ADMIN — LEVALBUTEROL HYDROCHLORIDE 0.63 MG: 0.63 SOLUTION RESPIRATORY (INHALATION) at 07:05

## 2020-02-22 RX ADMIN — INSULIN LISPRO 3 UNITS: 100 INJECTION, SOLUTION INTRAVENOUS; SUBCUTANEOUS at 08:16

## 2020-02-22 RX ADMIN — BENZONATATE 100 MG: 100 CAPSULE ORAL at 08:13

## 2020-02-22 RX ADMIN — BUMETANIDE 1 MG: 1 TABLET ORAL at 08:12

## 2020-02-22 RX ADMIN — CARVEDILOL 6.25 MG: 6.25 TABLET, FILM COATED ORAL at 08:43

## 2020-02-22 NOTE — DISCHARGE SUMMARY
Discharge Summary     Patient:  Govind Haynes       MRN: [id number]       YOB: 1928       Age: 80 y.o. Date of admission:  2/19/2020    Date of discharge:  2/22/2020    Primary care provider: Dr. Kanwal Oakley MD    Admitting provider:  Darion Clancy MD    Discharging provider:  Rut Harvey MD - [phone]  If unavailable, call [phone] and ask the  to page the triage hospitalist.    Consultations  · IP CONSULT TO CARDIOLOGY  · IP CONSULT TO CARDIOLOGY  · IP CONSULT TO PALLIATIVE CARE - PROVIDER    Procedures  · * No surgery found *    Discharge destination: HOME with Judy Beardf . The patient is stable for discharge. Admission diagnosis  · Acute systolic CHF (congestive heart failure) ([facility] [facility] [I50.21]    Current Discharge Medication List      START taking these medications    Details   bumetanide (BUMEX) 1 mg tablet Take 1 Tab by mouth daily. Qty: 30 Tab, Refills: 0      spironolactone (ALDACTONE) 25 mg tablet Take 1 Tab by mouth daily. Qty: 30 Tab, Refills: 0         CONTINUE these medications which have NOT CHANGED    Details   benzonatate (TESSALON) 100 mg capsule Take 1 Cap by mouth three (3) times daily as needed for Cough for up to 5 days. Qty: 15 Cap, Refills: 0      losartan (COZAAR) 25 mg tablet Take 0.5 Tabs by mouth daily. Qty: 15 Tab, Refills: 0      levothyroxine (SYNTHROID) 50 mcg tablet Take 50 mcg by mouth Daily (before breakfast). insulin glargine (LANTUS U-100 INSULIN) 100 unit/mL injection 22 Units by SubCUTAneous route Daily (before lunch). SITagliptin (JANUVIA) 50 mg tablet Take 50 mg by mouth daily. carvedilol (COREG) 6.25 mg tablet TAKE 1 TABLET TWICE A DAY WITH MEALS  Qty: 180 Tab, Refills: 0    Associated Diagnoses: SOB (shortness of breath); Chronic systolic heart failure ([facility] [address]  Essential hypertension      sertraline (ZOLOFT) 100 mg tablet Take 100 mg by mouth daily. melatonin 10 mg cap Take 10 mg by mouth nightly. STOP taking these medications       furosemide (LASIX) 20 mg tablet Comments:   Reason for Stopping:         co-enzyme Q-10 (CO Q-10) 50 mg capsule Comments:   Reason for Stopping:         DOCOSAHEXANOIC ACID/EPA (FISH OIL PO) Comments:   Reason for Stopping: Follow-up Information     Follow up With Specialties Details Why Contact Info    Ernesto Harley MD Cardiology On 2/27/2020 4:20 pm  [address] [facility] [address]    Today            Final discharge diagnoses and brief hospital course  Please also refer to the admission H&P for details on the presenting problem. 81 yo female with PMHx of CVA, DM, Dementia, Chronic systolic CHF, Breast Ca admitted for cough.      Acute Pulmonary Edema, Acute Systolic CHF, EF 01-72%  Bacterial Pneumonia   - s/p Cefepime   - Bumex 1mg and Aldactone 25mg daily per cardio  - d/w Daughter Terrie Hoang regarding decreased EF, risk of frequent hospitalization due to worsening CHF, recommend Hospice. She is in agreement for hospice.   - home with hospice  - ICD shock therapy turned off     Nonsustainted VT , asymptomatic on Tele  - Coreg     Reactive Airway Disease due to above  - NEBS PRN   - monitor     Acute Kidney Insufficiency  - Cr improved      DM  - c/w Januvia and Lantus      HTN  - c/w Coreg, Cozaar      Hx of Breast Ca  - outpatient follow as neded      Dementia without behavioral disturbance  - stable   - Alb 2.7  - appetite fair          FOLLOW-UP TESTS recommended: NONE     PENDING TEST RESULTS:  At the time of your discharge the following test results are still pending: NONE  Please make sure you review these results with your outpatient follow-up provider(s).     SYMPTOMS to watch for: chest pain, shortness of breath, fever, chills, nausea, vomiting, diarrhea, change in mentation, falling, weakness, bleeding.     DIET/what to eat:  Regular Diet     ACTIVITY:  Activity as tolerated     WOUND CARE: NONE     EQUIPMENT needed:  NONE       Physical examination at discharge  Visit Vitals  /74 (BP 1 Location: Left arm, BP Patient Position: At rest)   Pulse 85   Temp 98 °F (36.7 °C)   Resp 16   Ht 5' 5\" (1.651 m)   Wt 78.4 kg (172 lb 12.8 oz)   SpO2 92%   BMI 28.76 kg/m²     Awake and alert  PERRLA  Lung Mild wheezing, improved air entry  CVS: RRR  Abd: soft NT ND  Ext no adolfo    Pertinent imaging studies:    ECHO  Result status: Final result   · Normal cavity size and wall thickness. Severe systolic function. Estimated left ventricular ejection fraction is 20 - 25%. Visually measured ejection fraction. Abnormal left ventricular septal motion consistent with right ventricular pacing. Recent Labs     02/21/20  0206 02/20/20  0425 02/19/20  1600   WBC 2.8* 3.3* 6.0   HGB 7.9* 7.6* 7.9*   HCT 23.3* 22.9* 25.4*    258 259     Recent Labs     02/21/20  0206 02/20/20  1422 02/20/20  0425 02/19/20  1600   *  --  131* 129*   K 3.1* 4.0 3.5 4.9     --  100 102   CO2 16*  --  19* 19*   BUN 38*  --  47* 50*   CREA 1.21*  --  1.58* 1.72*   *  --  88 121*   CA 6.8*  --  8.1* 8.4*   MG  --  2.0  --   --      Recent Labs     02/20/20  0425 02/19/20  1600   SGOT 37 54*   AP 50 51   TP 6.4 6.6   ALB 2.9* 3.1*   GLOB 3.5 3.5     No results for input(s): INR, PTP, APTT, INREXT in the last 72 hours. No results for input(s): FE, TIBC, PSAT, FERR in the last 72 hours. No results for input(s): PH, PCO2, PO2 in the last 72 hours. No results for input(s): CPK, CKMB in the last 72 hours.     No lab exists for component: TROPONINI  No components found for: [place]    Chronic Diagnoses:    Problem List as of 2/22/2020 Date Reviewed: 12/26/2019          Codes Class Noted - Resolved    * (Principal) Acute on chronic systolic CHF (congestive heart failure) ([facility] ICD-10-CM: E19.22  ICD-9-CM: 428.23, 428.0  2/19/2020 - Present Pneumonia ICD-10-CM: J18.9  ICD-9-CM: 764  2/19/2020 - Present        Pneumonia due to human metapneumovirus ICD-10-CM: J12.3  ICD-9-CM: 480.8  2/19/2020 - Present        CKD (chronic kidney disease) stage 3, GFR 30-59 ml/min ([facility]) ICD-10-CM: N18.3  ICD-9-CM: 585.3  2/19/2020 - Present        Acute bronchiolitis ICD-10-CM: J21.9  ICD-9-CM: 466.19  2/18/2020 - Present        Acute on chronic renal failure (HCC) ICD-10-CM: N17.9, N18.9  ICD-9-CM: 584.9, 585.9  2/17/2020 - Present        Hyponatremia with extracellular fluid depletion ICD-10-CM: E87.1  ICD-9-CM: 276.1  2/17/2020 - Present        Bronchitis, acute ICD-10-CM: J20.9  ICD-9-CM: 466.0  2/17/2020 - Present        Cough ICD-10-CM: R05  ICD-9-CM: 786.2  2/17/2020 - Present        Acute bronchitis ICD-10-CM: J20.9  ICD-9-CM: 466.0  2/17/2020 - Present        Dyspnea ICD-10-CM: R06.00  ICD-9-CM: 786.09  12/26/2019 - Present        Arthritis ICD-10-CM: M19.90  ICD-9-CM: 716.90  Unknown - Present        Dyslipidemia ICD-10-CM: E78.5  ICD-9-CM: 272.4  Unknown - Present        Hearing loss ICD-10-CM: H91.90  ICD-9-CM: 496. 9  Unknown - Present        Hypothyroidism ICD-10-CM: E03.9  ICD-9-CM: 244.9  Unknown - Present        LBBB (left bundle branch block) ICD-10-CM: I44.7  ICD-9-CM: 426.3  Unknown - Present        Non-ischemic cardiomyopathy ([facility] [address] [facility] ICD-10-CM: I42.8  ICD-9-CM: 425.4  Unknown - Present    Overview Signed 12/26/2019  2:12 PM by Juni Godwin MD     Cath 2/16/15 - Mild (non-obstructive) CAD,LVEF 30%, 2+ MR             Systolic CHF [facility]) VVP-77-HC: I50.20  ICD-9-CM: 428.20, 428.0  Unknown - Present    Overview Signed 12/26/2019  2:12 PM by uJni Godwin MD     Admission 2/13/16             Cerebral microvascular disease ICD-10-CM: I67.9  ICD-9-CM: 437.9  Unknown - Present        Dementia ([facility] [address] ICD-10-CM: F03.90  ICD-9-CM: 294.20  Unknown - Present        Macrocytic anemia ICD-10-CM: D53.9  ICD-9-CM: 281.9  7/31/2019 - Present        Weakness ICD-10-CM: R53.1  ICD-9-CM: 780.79  7/31/2019 - Present        JENN (acute kidney injury) ([facility] ICD-10-CM: N17.9  ICD-9-CM: 584.9  7/31/2019 - Present        Biventricular automatic implantable cardioverter defibrillator in situ ICD-10-CM: Z95.810  ICD-9-CM: V45.02  1/3/2018 - Present        Type 2 diabetes mellitus with nephropathy ([facility] ICD-10-CM: E11.21  ICD-9-CM: 250.40, 583.81  1/3/2018 - Present        HFrEF (heart failure with reduced ejection fraction) ([facility] ICD-10-CM: I50.20  ICD-9-CM: 428.20  8/23/2016 - Present        HTN (hypertension), benign ICD-10-CM: I10  ICD-9-CM: 401.1  Unknown - Present        GERD (gastroesophageal reflux disease) ICD-10-CM: K21.9  ICD-9-CM: 530.81  Unknown - Present        Sleep apnea ICD-10-CM: G47.30  ICD-9-CM: 780.57  Unknown - Present    Overview Signed 2/13/2016  7:39 AM by Marie Iglesias MD     wears O2 at night- cannot tolerate CPAP             RESOLVED: Diabetes ([facility] ICD-10-CM: E11.9  ICD-9-CM: 250.00  Unknown - 12/26/2019        RESOLVED: Breast cancer ([facility] ICD-10-CM: C50.919  ICD-9-CM: 174.9  Unknown - 12/26/2019        RESOLVED: Thyroid disease ICD-10-CM: E07.9  ICD-9-CM: 246.9  7/31/2019 - 12/26/2019        RESOLVED: Breast CA ([facility] ICD-10-CM: C50.919  ICD-9-CM: 174.9  7/31/2019 - 12/26/2019        RESOLVED: Diarrhea ICD-10-CM: R19.7  ICD-9-CM: 787.91  7/31/2019 - 12/26/2019        RESOLVED: UTI (urinary tract infection) ICD-10-CM: N39.0  ICD-9-CM: 599.0  7/31/2019 - 12/26/2019        RESOLVED: Systolic CHF, acute ([facility] ICD-10-CM: I50.21  ICD-9-CM: 428.21, 428.0  2/22/2016 - 8/23/2016        RESOLVED: Acute respiratory failure with hypoxia ([facility] ICD-10-CM: J96.01  ICD-9-CM: 518.81  2/13/2016 - 8/23/2016        RESOLVED: DM type 2 (diabetes mellitus, type 2) ([facility] ICD-10-CM: E11.9  ICD-9-CM: 250.00  2/13/2016 - 12/26/2019              Time spent on discharge related activities today greater than 30 minutes.       Signed:  Dylon Boyd MD Hospitalist, Internal Medicine      Cc: Amanda Galarza MD

## 2020-02-22 NOTE — PROGRESS NOTES
I have reviewed discharge instructions with the patient and caregiver. The patient and caregiver verbalized understanding. IV removed. E-signature obtained. No further questions at this time. Pt wheeled down for transportation home. RN notified [facility] of pt's anticipated time of arrival at her private residence.

## 2020-02-22 NOTE — DISCHARGE INSTRUCTIONS
Please bring this form with you to show your primary care provider at your follow-up appointment. Primary care provider:  Dr. Deon Guo MD    Discharging provider:  Zach Camejo MD    You have been admitted to the hospital with the following diagnoses:  · Acute systolic CHF (congestive heart failure) ([facility] [address] [I50.21]   · Acute Pulmonary Edema  · Pneumonia     FOLLOW-UP CARE RECOMMENDATIONS:    APPOINTMENTS:  Follow-up Information     Follow up With Specialties Details Why Contact Info    Mars Trinidad MD Cardiology On 2/27/2020 4:20 pm  [address] [address] [facility]  [phone]      [address] [facility]    Today              FOLLOW-UP TESTS recommended: NONE    PENDING TEST RESULTS:  At the time of your discharge the following test results are still pending: NONE  Please make sure you review these results with your outpatient follow-up provider(s). SYMPTOMS to watch for: chest pain, shortness of breath, fever, chills, nausea, vomiting, diarrhea, change in mentation, falling, weakness, bleeding. DIET/what to eat:  Regular Diet    ACTIVITY:  Activity as tolerated    WOUND CARE: NONE    EQUIPMENT needed:  NONE      What to do if new or unexpected symptoms occur? If you experience any of the above symptoms (or should other concerns or questions arise after discharge) please call your primary care physician. Return to the emergency room if you cannot get hold of your doctor. · It is very important that you keep your follow-up appointment(s). · Please bring discharge papers, medication list (and/or medication bottles) to your follow-up appointments for review by your outpatient provider(s). · Please check the list of medications and be sure it includes every medication (even non-prescription medications) that your provider wants you to take. · It is important that you take the medication exactly as they are prescribed.    · Keep your medication in the bottles provided by the pharmacist and keep a list of the medication names, dosages, and times to be taken in your wallet. · Do not take other medications without consulting your doctor. · If you have any questions about your medications or other instructions, please talk to your nurse or care provider before you leave the hospital.    I understand that if any problems occur once I am at home I am to contact my physician. These instructions were explained to me and I had the opportunity to ask questions. Patient Education        Bronchitis: Care Instructions  Your Care Instructions    Bronchitis is inflammation of the bronchial tubes, which carry air to the lungs. The tubes swell and produce mucus, or phlegm. The mucus and inflamed bronchial tubes make you cough. You may have trouble breathing. Most cases of bronchitis are caused by viruses like those that cause colds. Antibiotics usually do not help and they may be harmful. Bronchitis usually develops rapidly and lasts about 2 to 3 weeks in otherwise healthy people. Follow-up care is a key part of your treatment and safety. Be sure to make and go to all appointments, and call your doctor if you are having problems. It's also a good idea to know your test results and keep a list of the medicines you take. How can you care for yourself at home? · Take all medicines exactly as prescribed. Call your doctor if you think you are having a problem with your medicine. · Get some extra rest.  · Take an over-the-counter pain medicine, such as acetaminophen (Tylenol), ibuprofen (Advil, Motrin), or naproxen (Aleve) to reduce fever and relieve body aches. Read and follow all instructions on the label. · Do not take two or more pain medicines at the same time unless the doctor told you to. Many pain medicines have acetaminophen, which is Tylenol. Too much acetaminophen (Tylenol) can be harmful.   · Take an over-the-counter cough medicine that contains dextromethorphan to help quiet a dry, hacking cough so that you can sleep. Avoid cough medicines that have more than one active ingredient. Read and follow all instructions on the label. · Breathe moist air from a humidifier, hot shower, or sink filled with hot water. The heat and moisture will thin mucus so you can cough it out. · Do not smoke. Smoking can make bronchitis worse. If you need help quitting, talk to your doctor about stop-smoking programs and medicines. These can increase your chances of quitting for good. When should you call for help? Call 911 anytime you think you may need emergency care. For example, call if:    · You have severe trouble breathing.    Call your doctor now or seek immediate medical care if:    · You have new or worse trouble breathing.     · You cough up dark brown or bloody mucus (sputum).     · You have a new or higher fever.     · You have a new rash.    Watch closely for changes in your health, and be sure to contact your doctor if:    · You cough more deeply or more often, especially if you notice more mucus or a change in the color of your mucus.     · You are not getting better as expected. Where can you learn more? Go to http://smita-armand.info/. Enter H333 in the search box to learn more about \"Bronchitis: Care Instructions. \"  Current as of: June 9, 2019  Content Version: 12.2  © 3926-6037 [employer]. Care instructions adapted under license by [employer] (which disclaims liability or warranty for this information). If you have questions about a medical condition or this instruction, always ask your healthcare professional. [place] [address] any warranty or liability for your use of this information. Avoiding Triggers With Heart Failure: Care Instructions  Your Care Instructions    Triggers are anything that make your heart failure flare up. A flare-up is also called \"sudden heart failure\" or \"acute heart failure. \" When you have a flare-up, fluid builds up in your lungs, and you have problems breathing. You might need to go to the hospital. By watching for changes in your condition and avoiding triggers, you can prevent heart failure flare-ups. Follow-up care is a key part of your treatment and safety. Be sure to make and go to all appointments, and call your doctor if you are having problems. It's also a good idea to know your test results and keep a list of the medicines you take. How can you care for yourself at home? Watch for changes in your weight and condition  · Weigh yourself without clothing at the same time each day. Record your weight. Call your doctor if you gain 3 pounds or more in 24 hrs or 5 pounds in one week. A sudden weight gain may mean that your heart failure is getting worse. · Keep a daily record of your symptoms. Write down any changes in how you feel, such as new shortness of breath, cough, or problems eating. Also record if your ankles are more swollen than usual and if you have to urinate in the night more often. Note anything that you ate or did that could have triggered these changes. Limit sodium  Sodium causes your body to hold on to water, making it harder for your heart to pump. People get most of their sodium from processed foods. Fast food and restaurant meals also tend to be very high in sodium. · Your doctor may suggest that you limit sodium to 1,500 milligrams (mg) a day. That is less than 1 teaspoon of salt a day, including all the salt you eat in cooking or in packaged foods. · Read food labels on cans and food packages. They tell you how much sodium you get in one serving. Check the serving size. If you eat more than one serving, you are getting more sodium. · Be aware that sodium can come in forms other than salt, including monosodium glutamate (MSG), sodium citrate, and sodium bicarbonate (baking soda). MSG is often added to Asian food.  You can sometimes ask for food without MSG or salt. · Slowly reducing salt will help you adjust to the taste. Take the salt shaker off the table. · Flavor your food with garlic, lemon juice, onion, vinegar, herbs, and spices instead of salt. Do not use soy sauce, steak sauce, onion salt, garlic salt, mustard, or ketchup on your food, unless it is labeled \"low-sodium\" or \"low-salt. \"  · Make your own salad dressings, sauces, and ketchup without adding salt. · Use fresh or frozen ingredients, instead of canned ones, whenever you can. Choose low-sodium canned goods. · Eat less processed food and food from restaurants, including fast food. Exercise as directed  Moderate, regular exercise is very good for your heart. It improves your blood flow and helps control your weight. But too much exercise can stress your heart and cause a heart failure flare-up. · Check with your doctor before you start an exercise program.  · Walking is an easy way to get exercise. Start out slowly. Gradually increase the length and pace of your walk. Swimming, riding a bike, and using a treadmill are also good forms of exercise. · When you exercise, watch for signs that your heart is working too hard. You are pushing yourself too hard if you cannot talk while you are exercising. If you become short of breath or dizzy or have chest pain, stop, sit down, and rest.  · Do not exercise when you do not feel well. Take medicines correctly  · Take your medicines exactly as prescribed. Call your doctor if you think you are having a problem with your medicine. · Make a list of all the medicines you take. Include those prescribed to you by other doctors and any over-the-counter medicines, vitamins, or supplements you take. Take this list with you when you go to any doctor. · Take your medicines at the same time every day. It may help you to post a list of all the medicines you take every day and what time of day you take them. · Make taking your medicine as simple as you can.  Plan times to take your medicines when you are doing other things, such as eating a meal or getting ready for bed. This will make it easier to remember to take your medicines. · Get organized. Use helpful tools, such as daily or weekly pill containers. When should you call for help? Call 911 if you have symptoms of sudden heart failure such as:  · You have severe trouble breathing. · You cough up pink, foamy mucus. · You have a new irregular or rapid heartbeat. Call your doctor now or seek immediate medical care if:  · You have new or increased shortness of breath. · You are dizzy or lightheaded, or you feel like you may faint. · You have sudden weight gain, such as 3 pounds in 24 hours, or 5 pounds in one week. · You have increased swelling in your legs, ankles, or feet. · You are suddenly so tired or weak that you cannot do your usual activities. Watch closely for changes in your health, and be sure to contact your doctor if you develop new symptoms. Where can you learn more? Go to http://smita-armand.info/  Enter V089 in the search box to learn more about \"Avoiding Triggers With Heart Failure: Care Instructions. \"  © 7742-4167 [employer]. Care instructions adapted under license by [employer] (which disclaims liability or warranty for this information). This care instruction is for use with your licensed healthcare professional. If you have questions about a medical condition or this instruction, always ask your healthcare professional. [place] [address] any warranty or liability for your use of this information. Content Version: 62.7.257377; Current as of: January 27, 2016 (modified 2/9/18).

## 2020-02-22 NOTE — PROGRESS NOTES
10:54 AM  Pt discharging home today with hospice. Pt's son signed 2nd. IMM letter.  Anastasiya Emanuel

## 2020-02-22 NOTE — PROGRESS NOTES
Roque Rockwell MD    [address] [place], [address] [facility]    Office [phone],LPB [phone]  Pager [phone]    2020     Admit Date: 2020    Admit Diagnosis: Acute systolic CHF (congestive heart failure) ([address] [I50.21]    NAME: Jackie Villalpando   :  1928     MRN: [id number]     Assessment/Plan:    Acute on chronic HFrEF; NICM; s/p AICD  NSVT  Pneumonia  Renal insufficiency  Hypokalemia  Anemia  Advanced age      Plan:    Creatinine improved 1.58-1.21  On Bumex 1 mg daily. Continue Coreg, Cozaar, spironolactone  Reviewed palliative care notes-patient opting for comfort care/hospice  AICD turned off  Will follow peripherally. Please call if needed               20   ECHO ADULT FOLLOW-UP OR LIMITED 2020    Narrative · Normal cavity size and wall thickness. Severe systolic function. Estimated left ventricular ejection fraction is 20 - 25%. Visually   measured ejection fraction. Abnormal left ventricular septal motion   consistent with right ventricular pacing. Signed by: Jossie Pena MD       Please do not hesitate to contact us with questions or concerns. See note below for details.     Roque Rockwell MD      Subjective:  No chest pain, dyspnea at rest    ROS:  (bold if positive, if negative)    edema,       Current Facility-Administered Medications   Medication Dose Route Frequency    potassium bicarb-citric acid (EFFER-K) tablet 40 mEq  40 mEq Oral BID    sodium bicarbonate tablet 650 mg  650 mg Oral BID    spironolactone (ALDACTONE) tablet 25 mg  25 mg Oral DAILY    carvediloL (COREG) tablet 6.25 mg  6.25 mg Oral BID WITH MEALS    alogliptin (NESINA) tablet 12.5 mg  12.5 mg Oral DAILY    methylPREDNISolone (PF) (SOLU-MEDROL) injection 40 mg  40 mg IntraVENous Q12H    insulin lispro (HUMALOG) injection 8 Units  8 Units SubCUTAneous TIDAC    benzonatate (TESSALON) capsule 100 mg  100 mg Oral TID    bumetanide (BUMEX) tablet 1 mg  1 mg Oral DAILY    levalbuterol (XOPENEX) nebulizer soln 0.63 mg/3 mL  0.63 mg Nebulization Q4H RT    sodium chloride (NS) flush 5-40 mL  5-40 mL IntraVENous Q8H    sodium chloride (NS) flush 5-40 mL  5-40 mL IntraVENous PRN    acetaminophen (TYLENOL) tablet 650 mg  650 mg Oral Q4H PRN    naloxone (NARCAN) injection 0.4 mg  0.4 mg IntraVENous PRN    ondansetron (ZOFRAN) injection 4 mg  4 mg IntraVENous Q4H PRN    morphine injection 1 mg  1 mg IntraVENous Q4H PRN    cefepime (MAXIPIME) 2 g in 0.9% sodium chloride (MBP/ADV) 100 mL  2 g IntraVENous Q24H    levothyroxine (SYNTHROID) tablet 50 mcg  50 mcg Oral ACB    melatonin tablet 10.5 mg  10.5 mg Oral QHS    losartan (COZAAR) tablet 12.5 mg  12.5 mg Oral DAILY    sertraline (ZOLOFT) tablet 100 mg  100 mg Oral DAILY    insulin lispro (HUMALOG) injection   SubCUTAneous AC&HS    glucose chewable tablet 16 g  4 Tab Oral PRN    glucagon (GLUCAGEN) injection 1 mg  1 mg IntraMUSCular PRN    dextrose 10% infusion 0-250 mL  0-250 mL IntraVENous PRN    enoxaparin (LOVENOX) injection 30 mg  30 mg SubCUTAneous Q24H       Physical Exam:  Visit Vitals  /74 (BP 1 Location: Left arm, BP Patient Position: At rest)   Pulse 85   Temp 98 °F (36.7 °C)   Resp 16   Ht 5' 5\" (1.651 m)   Wt 172 lb 12.8 oz (78.4 kg)   SpO2 92%   BMI 28.76 kg/m²    O2 Flow Rate (L/min): 2 l/min O2 Device: Room air       Telemetry:     Gen: Elderly, in no acute distress  Neck: Supple,  Resp: No accessory muscle use, Clear breath sounds, No rales or rhonchi  Card: Regular Rate,Rythm,Normal S1, S2, 2/6 systolic murmur present  Abd:  Soft, non-tender, non-distended,BS+,   LE: 1+ edema    EKG:        Cxray:    LABS:        Lab Results   Component Value Date/Time    WBC 2.8 (L) 02/21/2020 02:06 AM    HGB 7.9 (L) 02/21/2020 02:06 AM    HCT 23.3 (L) 02/21/2020 02:06 AM    PLATELET 319 23/37/2841 02:06 AM    .0 (H) 02/21/2020 02:06 AM     Lab Results   Component Value Date/Time    Sodium 135 (L) 02/21/2020 02:06 AM    Potassium 3.1 (L) 02/21/2020 02:06 AM    Chloride 108 02/21/2020 02:06 AM    CO2 16 (L) 02/21/2020 02:06 AM    Anion gap 11 02/21/2020 02:06 AM    Glucose 158 (H) 02/21/2020 02:06 AM    BUN 38 (H) 02/21/2020 02:06 AM    Creatinine 1.21 (H) 02/21/2020 02:06 AM    BUN/Creatinine ratio 31 (H) 02/21/2020 02:06 AM    GFR est AA 51 (L) 02/21/2020 02:06 AM    GFR est non-AA 42 (L) 02/21/2020 02:06 AM    Calcium 6.8 (L) 02/21/2020 02:06 AM     Lab Results   Component Value Date/Time     02/14/2016 05:35 AM    CK-MB Index 2.4 02/14/2016 05:35 AM    Troponin-I, Qt. 0.22 (H) 02/20/2020 04:25 AM     Lab Results   Component Value Date/Time    aPTT 28.5 10/15/2010 11:00 AM     Lab Results   Component Value Date/Time    INR 1.1 02/17/2020 01:42 PM    INR 1.0 08/08/2016 03:56 PM    INR 1.0 01/15/2014 12:50 PM    Prothrombin time 10.8 02/17/2020 01:42 PM    Prothrombin time 10.0 08/08/2016 03:56 PM    Prothrombin time 10.2 01/15/2014 12:50 PM     No results found for: BNP, BNPP, XBNPT    Care Plan discussed with:   Royal Rangel MD

## 2020-02-24 ENCOUNTER — PATIENT OUTREACH (OUTPATIENT)
Dept: FAMILY MEDICINE CLINIC | Age: 85
End: 2020-02-24

## 2020-02-24 LAB
BACTERIA SPEC CULT: NORMAL
SERVICE CMNT-IMP: NORMAL

## 2020-02-24 NOTE — PROGRESS NOTES
Patient actively a bundle patient that was discharge home with hospice through [employer]. LPN Care Coordinator will follow up with the hospice company every 30 days for the next 90 days.

## 2020-03-24 ENCOUNTER — PATIENT OUTREACH (OUTPATIENT)
Dept: FAMILY MEDICINE CLINIC | Age: 85
End: 2020-03-24

## 2020-03-24 NOTE — PROGRESS NOTES
No transition of care outreach indicated due to patient discharge to hospice care [facility]).

## 2021-01-24 NOTE — ED TRIAGE NOTES
Pt to ed via ems for c/o shortness of breath that started around 2 hours ago. Pt reports started when lying in bed.  Hx of CHF reports only 2lb weight gain today no complications

## 2022-03-18 PROBLEM — R53.1 WEAKNESS: Status: ACTIVE | Noted: 2019-07-31

## 2022-03-18 PROBLEM — J20.9 BRONCHITIS, ACUTE: Status: ACTIVE | Noted: 2020-02-17

## 2022-03-18 PROBLEM — J20.9 ACUTE BRONCHITIS: Status: ACTIVE | Noted: 2020-02-17

## 2022-03-18 PROBLEM — J21.9 ACUTE BRONCHIOLITIS: Status: ACTIVE | Noted: 2020-02-18

## 2022-03-18 PROBLEM — D53.9 MACROCYTIC ANEMIA: Status: ACTIVE | Noted: 2019-07-31

## 2022-03-19 PROBLEM — J12.3 PNEUMONIA DUE TO HUMAN METAPNEUMOVIRUS: Status: ACTIVE | Noted: 2020-02-19

## 2022-03-19 PROBLEM — J18.9 PNEUMONIA: Status: ACTIVE | Noted: 2020-02-19

## 2022-03-19 PROBLEM — E87.1 HYPONATREMIA WITH EXTRACELLULAR FLUID DEPLETION: Status: ACTIVE | Noted: 2020-02-17

## 2022-03-19 PROBLEM — E11.21 TYPE 2 DIABETES MELLITUS WITH NEPHROPATHY (HCC): Status: ACTIVE | Noted: 2018-01-03

## 2022-03-19 PROBLEM — Z95.810 BIVENTRICULAR AUTOMATIC IMPLANTABLE CARDIOVERTER DEFIBRILLATOR IN SITU: Status: ACTIVE | Noted: 2018-01-03

## 2022-03-19 PROBLEM — R06.00 DYSPNEA: Status: ACTIVE | Noted: 2019-12-26

## 2022-03-19 PROBLEM — R05.9 COUGH: Status: ACTIVE | Noted: 2020-02-17

## 2022-03-19 PROBLEM — I50.23 ACUTE ON CHRONIC SYSTOLIC CHF (CONGESTIVE HEART FAILURE) (HCC): Status: ACTIVE | Noted: 2020-02-19

## 2022-03-19 PROBLEM — N18.9 ACUTE ON CHRONIC RENAL FAILURE (HCC): Status: ACTIVE | Noted: 2020-02-17

## 2022-03-19 PROBLEM — N17.9 ACUTE ON CHRONIC RENAL FAILURE (HCC): Status: ACTIVE | Noted: 2020-02-17

## 2022-03-20 PROBLEM — N17.9 AKI (ACUTE KIDNEY INJURY) (HCC): Status: ACTIVE | Noted: 2019-07-31

## 2022-03-20 PROBLEM — N18.30 CKD (CHRONIC KIDNEY DISEASE) STAGE 3, GFR 30-59 ML/MIN (HCC): Status: ACTIVE | Noted: 2020-02-19

## 2022-10-24 NOTE — DISCHARGE INSTRUCTIONS
Impaired ADLs, functional transfers/mobility Patient Discharge Instructions    [facility] / [id number] : 1928    Admitted 2019 Discharged: 8/3/2019     Primary Diagnoses  Problem List as of 8/3/2019 Date Reviewed: 2019           Thyroid disease   Breast CA ([facility]   Diarrhea   Anemia   Weakness   UTI (urinary tract infection)   JENN (acute kidney injury) ([facility]   Biventricular automatic implantable cardioverter defibrillator in situ   Type 2 diabetes mellitus with nephropathy (HCC)   HFrEF (heart failure with reduced ejection fraction) ([facility]   Hypertension   GERD (gastroesophageal reflux disease)   Sleep apnea   DM type 2 (diabetes mellitus, type 2) ([facility]          Take Home Medications     · It is important that you take the medication exactly as they are prescribed. · Keep your medication in the bottles provided by the pharmacist and keep a list of the medication names, dosages, and times to be taken in your wallet. · Do not take other medications without consulting your doctor. What to do at Home    Recommended diet: Cardiac Diet, Diabetic Diet and Low fat, Low cholesterol    Recommended activity: Activity as tolerated and PT/OT Eval and Treat    If you experience worse symptoms, please follow up with your PCP. Follow-up with your PCP and Dr Judy Hester in hematoogy in a few weeks        Information obtained by :  I understand that if any problems occur once I am at home I am to contact my physician. I understand and acknowledge receipt of the instructions indicated above.                                                                                                                                            Physician's or R.N.'s Signature                                                                  Date/Time                                                                                                                                              Patient or Representative Signature Date/Time

## 2023-05-30 RX ORDER — LOSARTAN POTASSIUM 25 MG/1
TABLET ORAL DAILY
COMMUNITY
Start: 2020-01-23

## 2023-05-30 RX ORDER — BUMETANIDE 1 MG/1
TABLET ORAL DAILY
COMMUNITY
Start: 2020-02-23

## 2023-05-30 RX ORDER — LEVOTHYROXINE SODIUM 0.05 MG/1
TABLET ORAL
COMMUNITY

## 2023-05-30 RX ORDER — SERTRALINE HYDROCHLORIDE 100 MG/1
100 TABLET, FILM COATED ORAL DAILY
COMMUNITY

## 2023-05-30 RX ORDER — SPIRONOLACTONE 25 MG/1
TABLET ORAL DAILY
COMMUNITY
Start: 2020-02-23

## 2023-05-30 RX ORDER — CARVEDILOL 6.25 MG/1
1 TABLET ORAL 2 TIMES DAILY WITH MEALS
COMMUNITY
Start: 2019-04-26

## 2023-05-30 RX ORDER — MELATONIN 10 MG
10 CAPSULE ORAL NIGHTLY
COMMUNITY

## 2023-05-30 RX ORDER — INSULIN GLARGINE 100 [IU]/ML
INJECTION, SOLUTION SUBCUTANEOUS
COMMUNITY

## 2023-11-16 NOTE — ED PROVIDER NOTES
Note completed.  Thank you The history is provided by the patient. No  was used. Shortness of Breath   This is a new problem. The problem occurs rarely. The current episode started 3 to 5 hours ago. The problem has been gradually improving. Pertinent negatives include no fever, no headaches, no coryza, no rhinorrhea, no sore throat, no swollen glands, no ear pain, no neck pain, no cough, no sputum production, no hemoptysis, no wheezing, no PND, no orthopnea, no chest pain, no syncope, no vomiting, no abdominal pain, no rash, no leg pain, no leg swelling and no claudication. It is unknown what precipitated the problem. She has tried nothing for the symptoms. The treatment provided moderate relief. She has had prior hospitalizations. Associated medical issues include CAD and heart failure.         Past Medical History:   Diagnosis Date    Arthritis     [facility] CA [facility]     Breast cancer ([facility]     Diabetes ([facility]     Diarrhea     chronic     Dyslipidemia     GERD (gastroesophageal reflux disease)     Hearing loss     Hypertension     LBBB (left bundle branch block)     chronic LBBB    Melanoma ([address]     Non-ischemic cardiomyopathy ([address]     Cath 2/16/15 - Mild (non-obstructive) CAD,LVEF 30%, 2+ MR    Seizures (HCC)     X1 post partum after first child was born- no more seizures    Skin cancer     melanoma on legs    Sleep apnea     wears O2 at night- cannot tolerate CPAP    Snoring     SOB (shortness of breath)     Systolic CHF ([address]     Admission 2/13/16    Thyroid disease        Past Surgical History:   Procedure Laterality Date    HX BREAST LUMPECTOMY  2003    right    HX CATARACT REMOVAL  1994    bilateral    HX CHOLECYSTECTOMY      HX DILATION AND CURETTAGE  1960    HX HEART CATHETERIZATION      x2- no blockages    HX HEENT  2004    surgery on upper jaw    HX HYSTERECTOMY  1967    HX HYSTEROSCOPY      HX OOPHORECTOMY  1967    left    HX ORTHOPAEDIC  2010    right knee replacement    HX ORTHOPAEDIC      repair torn meniscus left knee    HX ORTHOPAEDIC  1996    repair knuckle on left pinky finger    HX OTHER SURGICAL  2000    remove benign tumore right side between rib and right lung    HX PACEMAKER      HX PARTIAL THYROIDECTOMY      HX UROLOGICAL  2003    bladder surgery x2         Family History:   Problem Relation Age of Onset    Stroke Mother        Social History     Socioeconomic History    Marital status:      Spouse name: Not on file    Number of children: Not on file    Years of education: Not on file    Highest education level: Not on file   Occupational History    Not on file   Social Needs    Financial resource strain: Not on file    Food insecurity:     Worry: Not on file     Inability: Not on file    Transportation needs:     Medical: Not on file     Non-medical: Not on file   Tobacco Use    Smoking status: Never Smoker    Smokeless tobacco: Never Used   Substance and Sexual Activity    Alcohol use: Yes     Alcohol/week: 4.0 standard drinks     Types: 3 Glasses of wine, 1 Shots of liquor per week    Drug use: No    Sexual activity: Never   Lifestyle    Physical activity:     Days per week: Not on file     Minutes per session: Not on file    Stress: Not on file   Relationships    Social connections:     Talks on phone: Not on file     Gets together: Not on file     Attends Evangelical service: Not on file     Active member of club or organization: Not on file     Attends meetings of clubs or organizations: Not on file     Relationship status: Not on file    Intimate partner violence:     Fear of current or ex partner: Not on file     Emotionally abused: Not on file     Physically abused: Not on file     Forced sexual activity: Not on file   Other Topics Concern    Not on file   Social History Narrative    Not on file         ALLERGIES: Beta-blockers (beta-adrenergic blocking agts); Other medication;  Phenobarbital; and Sulfa (sulfonamide antibiotics)    Review of Systems   Constitutional: Negative for activity change, chills and fever. HENT: Negative for ear pain, nosebleeds, rhinorrhea, sore throat, trouble swallowing and voice change. Eyes: Negative for visual disturbance. Respiratory: Positive for shortness of breath. Negative for cough, hemoptysis, sputum production and wheezing. Cardiovascular: Negative for chest pain, palpitations, orthopnea, claudication, leg swelling, syncope and PND. Gastrointestinal: Negative for abdominal pain, constipation, diarrhea, nausea and vomiting. Genitourinary: Negative for difficulty urinating, dysuria, hematuria and urgency. Musculoskeletal: Negative for back pain, neck pain and neck stiffness. Skin: Negative for color change and rash. Allergic/Immunologic: Negative for immunocompromised state. Neurological: Negative for dizziness, seizures, syncope, weakness, light-headedness, numbness and headaches. Psychiatric/Behavioral: Negative for behavioral problems, confusion, hallucinations, self-injury and suicidal ideas. Vitals:    12/19/19 1051   BP: (!) 164/141   Pulse: 82   Resp: 20   SpO2: 97%   Weight: 78 kg (172 lb)   Height: 5' 4\" (1.626 m)            Physical Exam  Vitals signs and nursing note reviewed. Constitutional:       General: She is not in acute distress. Appearance: She is well-developed. She is not diaphoretic. HENT:      Head: Normocephalic and atraumatic. Eyes:      Pupils: Pupils are equal, round, and reactive to light. Neck:      Musculoskeletal: Normal range of motion and neck supple. Cardiovascular:      Rate and Rhythm: Normal rate and regular rhythm. Heart sounds: Normal heart sounds. No murmur. No friction rub. No gallop. Pulmonary:      Effort: Pulmonary effort is normal. No respiratory distress. Breath sounds: Normal breath sounds. No wheezing. Abdominal:      General: Bowel sounds are normal. There is no distension. Palpations: Abdomen is soft. Tenderness: There is no tenderness. There is no guarding or rebound. Musculoskeletal: Normal range of motion. Skin:     General: Skin is warm. Findings: No rash. Neurological:      Mental Status: She is alert and oriented to person, place, and time. Psychiatric:         Behavior: Behavior normal.         Thought Content: Thought content normal.         Judgment: Judgment normal.          MDM     This is a 80-year-old female with past medical history, review of systems, physical exam as above, presenting with complaints of dyspnea. Patient states she woke this morning short of breath, and \"became scared\", that was something was wrong. She states she typically takes her medications in the morning, however did not take them today. She denies other complaints at this time. Chart review significant for history of congestive heart failure, and chronic kidney disease. Physical exam remarkable for well-appearing elderly female, in no acute distress, noted to have clear breath sounds, soft abdomen, regular rate and rhythm without murmurs gallops or rubs, no significant pedal edema. Differential includes ACS, CHF exacerbation. Plan to obtain CMP, CBC, EKG, chest x-ray, cardiac enzymes, BMP and UA. We will reassess, and make a disposition. Procedures    2:09 PM  Cardiology resident at bedside. BESIDE SIGN OUT:  3:09 PM  Discussed pt's hx, disposition, and available diagnostic and imaging results with Dr. Ines Day. Reviewed care plans. Both providers and patient are in agreement with care plan. Dr. Miguelina Tracey is transferring care of the pt to Dr. Ines Day at this time.

## 2024-02-14 NOTE — PROGRESS NOTES
Porter Foster MD. [facility] [place]              Patient: Elier Raza  : 1928      Today's Date: 11/15/2018            HISTORY OF PRESENT ILLNESS:     History of Present Illness:  Here for follow-up. She has been more tired lately. No CP. Breathing is \"OK\". No dizziness. PAST MEDICAL HISTORY:     Past Medical History:   Diagnosis Date    Arthritis     Breast CA ([address]     Breast cancer ([address]     Diabetes ([address]     Diarrhea     chronic     Dyslipidemia     GERD (gastroesophageal reflux disease)     Hearing loss     Hypertension     LBBB (left bundle branch block)     chronic LBBB    [facility]oma ([facility]     Non-ischemic cardiomyopathy ([facility]     Cath 2/16/15 - Mild (non-obstructive) CAD,LVEF 30%, 2+ MR    Seizures (HCC)     X1 post partum after first child was born- no more seizures    Skin cancer     melanoma on legs    Sleep apnea     wears O2 at night- cannot tolerate CPAP    Snoring     SOB (shortness of breath)     Systolic CHF ([facility]     Admission 16    Thyroid disease        Past Surgical History:   Procedure Laterality Date    HX BREAST LUMPECTOMY  2003    right    HX CATARACT REMOVAL      bilateral    HX CHOLECYSTECTOMY      HX DILATION AND CURETTAGE  1960    HX HEART CATHETERIZATION      x2- no blockages    HX HEENT      surgery on upper jaw    HX HYSTERECTOMY  1967    HX HYSTEROSCOPY      HX OOPHORECTOMY  1967    left    HX ORTHOPAEDIC  2010    right knee replacement    HX ORTHOPAEDIC      repair torn meniscus left knee    HX ORTHOPAEDIC      repair knuckle on left pinky finger    HX OTHER SURGICAL  2000    remove benign tumore right side between rib and right lung    HX PACEMAKER      HX PARTIAL THYROIDECTOMY      HX UROLOGICAL      bladder surgery x2           MEDICATIONS:     Current Outpatient Medications   Medication Sig Dispense Refill    levothyroxine (SYNTHROID) 88 mcg tablet Take  by mouth Daily (before breakfast).       losartan (COZAAR) 25 mg tablet Take 1 Tab by mouth daily. 30 Tab 0    carvedilol (COREG) 6.25 mg tablet TAKE 1 TABLET TWICE A DAY WITH MEALS 180 Tab 0    traZODone (DESYREL) 50 mg tablet Take 50 mg by mouth nightly.  bumetanide (BUMEX) 1 mg tablet Take 1 Tab by mouth daily. Take an extra dose in the afternoon as needed for weight gain. 90 Tab 3    potassium chloride (K-DUR, KLOR-CON) 20 mEq tablet Take 1 Tab by mouth daily. 30 Tab 3    pravastatin (PRAVACHOL) 40 mg tablet Take 1 Tab by mouth nightly. 90 Tab 3    LACTOBACILLUS ACIDOPHILUS (PROBIOTIC PO) Take  by mouth daily.  omeprazole (PRILOSEC) 20 mg capsule Take 20 mg by mouth daily.  aspirin 81 mg chewable tablet Take 1 Tab by mouth daily. 30 Tab 0    co-enzyme Q-10 (CO Q-10) 50 mg capsule Take 100 mg by mouth daily (after lunch).  sertraline (ZOLOFT) 100 mg tablet Take 100 mg by mouth nightly.  metFORMIN (GLUCOPHAGE) 1,000 mg tablet Take 1,000 mg by mouth two (2) times daily (with meals).  ergocalciferol (VITAMIN D2) 50,000 unit capsule Take one tablet on Monday, Wednesday and Friday.  melatonin 10 mg cap Take 5 mg by mouth nightly.  multivitamin (ONE A DAY) tablet Take 1 Tab by mouth daily (after lunch).  DOCOSAHEXANOIC ACID/EPA (FISH OIL PO) Take 1 Tab by mouth daily (after lunch). Allergies   Allergen Reactions    Beta-Blockers (Beta-Adrenergic Blocking Agts) Unknown (comments)     Made her feel tired, breakout on her back    Other Medication Unknown (comments)     Bromides    Phenobarbital Unknown (comments)    Sulfa (Sulfonamide Antibiotics) Swelling     rash           SOCIAL HISTORY:     Social History     Tobacco Use    Smoking status: Never Smoker    Smokeless tobacco: Never Used   Substance Use Topics    Alcohol use:  Yes     Alcohol/week: 2.4 oz     Types: 3 Glasses of wine, 1 Shots of liquor per week    Drug use: No         FAMILY HISTORY:     Family History   Problem Relation Age of Onset    Stroke Mother              REVIEW OF SYSTEMS:             Review of Symptoms:  Constitutional: Negative for fever, chills  HEENT: Negative for nosebleeds, tinnitus, and vision changes.    Respiratory: + HARTLEY, occ cough   Cardiovascular: Negative for  Syncope    Gastrointestinal: Negative for   melena.    Genitourinary: + frequent UTI's    Musculoskeletal: Negative for myalgias.    Skin: Negative for rash  Heme: No problems bleeding. Neurological: Negative for speech change and focal weakness.                PHYSICAL EXAM:             Physical Exam:  Visit Vitals  /70   Pulse 68   Ht 5' 4\" (1.626 m)   Wt 171 lb (77.6 kg)   BMI 29.35 kg/m²       Patient appears generally well, mood and affect are appropriate and pleasant. HEENT:  Hearing intact, non-icteric, normocephalic, atraumatic.    Neck Exam: Supple, No sig JVD sitting up.    Lung Exam: Clear to auscultation, even breath sounds.    Cardiac Exam: Regular rate and rhythm with no murmur  Abdomen: Soft, non-tender    Extremities: Moves all ext well. No lower extremity edema.   Psych: Appropriate affect  Neuro - Grossly intact              LABS / OTHER STUDIES:              Labs 2/7/18 - TSH 0.4, A1c 6.6, chol 132, TG 99, HDL 56, LDL 56, Cr 1.14, K 4.7, LFT's OK, Hgb 10.1, plt 199        Component      Latest Ref Rng & Units 8/6/2018           3:16 PM   NT pro-BNP      0 - 738 pg/mL 1,176 (H)     Lab Results   Component Value Date/Time    Sodium 141 08/06/2018 03:16 PM    Potassium 5.0 08/06/2018 03:16 PM    Chloride 104 08/06/2018 03:16 PM    CO2 22 08/06/2018 03:16 PM    Anion gap 10 02/18/2016 03:30 AM    Glucose 173 (H) 08/06/2018 03:16 PM    BUN 32 08/06/2018 03:16 PM    Creatinine 1.29 (H) 08/06/2018 03:16 PM    BUN/Creatinine ratio 25 08/06/2018 03:16 PM    GFR est AA 42 (L) 08/06/2018 03:16 PM    GFR est non-AA 37 (L) 08/06/2018 03:16 PM    Calcium 9.4 08/06/2018 03:16 PM    Bilirubin, total 0.4 08/06/2018 03:16 PM    AST (SGOT) 16 08/06/2018 03:16 PM    Alk. phosphatase 50 08/06/2018 03:16 PM    Protein, total 6.6 08/06/2018 03:16 PM    Albumin 4.2 08/06/2018 03:16 PM    Globulin 2.9 02/13/2016 05:40 AM    A-G Ratio 1.8 08/06/2018 03:16 PM    ALT (SGPT) 10 08/06/2018 03:16 PM       Lab Results   Component Value Date/Time    WBC 5.1 08/06/2018 03:16 PM    Hemoglobin (POC) 12.6 01/15/2014 12:49 PM    HGB 10.1 (L) 08/06/2018 03:16 PM    Hematocrit (POC) 37 01/15/2014 12:49 PM    HCT 31.0 (L) 08/06/2018 03:16 PM    PLATELET 161 12/08/8950 03:16 PM    MCV 98 (H) 08/06/2018 03:16 PM       Lab Results   Component Value Date/Time    Cholesterol, total 184 02/02/2017 12:00 PM    HDL Cholesterol 57 02/02/2017 12:00 PM    LDL, calculated 52 02/02/2017 12:00 PM    VLDL, calculated 75 (H) 02/02/2017 12:00 PM    Triglyceride 377 (H) 02/02/2017 12:00 PM                 CARDIAC DIAGNOSTICS:                 Cardiac Evaluation Includes:  EKG 2/13/16 - NSR, IVCD, PRWP      Echo 2/13/16 - TDS.   LVEF 30-35%.   There was moderate diffuse hypokinesis - Only the basal anterolateral and infrolateral walls contracts normally.  RV size and function normal.  RVSP 35      CTA chest 2/13/16 - No evidence of pulmonary embolus. Mild pulmonary interstitial edema and small bilateral pleural effusions. Small bilateral indeterminate pulmonary nodules, measuring up to 4 mm.      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 Moderate to severe deep white matter ischemic change. 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  No new confluent infarct or hemorrhage or mass effect.      Echo 2/8/18  - LVEF 50%, grade 1 diastology, RV normal, RVSP 35           EKG 12/1/15 - NSR, Nonspecific intraventricular block,   EKG 1/3/18 - V paced                 ASSESSMENT AND PLAN:             Assessment and Plan:  1) Acute / Chronic decompensated CHF (systolic dysfunction, HFrEF)(non-ischemic CM) - discovered 2/13/16   - Echo 4/14/16 shows that LVEF still is 30%.   Echo 5/26/16 shows LVEF just 20%.     Echo 7/28/16 with LVEF 25%. - BIV ICD placed 8/15/16   - She felt a little better afterwards    - On Echo 2/2/17 LVEF is better at 40%.  2/8/18, LVEF improved to 50%  - continue Coreg.    - Due to cough, switched from lisinopril to losartan. She could not tolerate Entresto at all.    - Due to prior falls (and risk of orthostasis), have kept meds on lower doses   - volume status is fair on Bumex  - I strongly recommend cardiac rehab, but she declines (she says she cannot make it there) - instead she will try yoga   - On 7/19/18 - still feels tired. BP runs low. Will have her try cutting Bumex to 1 mg in AM with afternoon dose just as needed. Also cut losartan dose in half. - On 11/15/18 - No changes. Is tired. Breathing OK. No dizziness. She is tolerating full dose losartan. Will recheck a limited echo for LVEF in 3 months.        2) HTN    - BP OK - asked her to follow BP at home   - continue meds     3) Preop   - she can proceed with her bladder procedure and can proceed with intermediate risk. Can hold ASA 5 days prior and resume afterwards.        4) See me in 3 months.  Patient expressed understanding of the plan - questions were answered.       On a side note, she is from [place] and worked for years as a . She is planning for a [address] [facility], [address] [place]  [address] Felicita Lemos [facility], [address] [address] [facility]  [address]  [place], [address] [place] [address], [address] [place] [address]  Ph: [phone]                                [phone]      ADDENDUM   11/15/2018  Labs 8/31/18 - Vit D 60, A1c 6.9        ADDENDUM   11/16/2018  Echo 11/15/18 - LVEF 45%     Patient was given results after study. Yes...